# Patient Record
Sex: MALE | Race: WHITE | NOT HISPANIC OR LATINO | Employment: OTHER | URBAN - METROPOLITAN AREA
[De-identification: names, ages, dates, MRNs, and addresses within clinical notes are randomized per-mention and may not be internally consistent; named-entity substitution may affect disease eponyms.]

---

## 2018-05-29 ENCOUNTER — CONVERSION ENCOUNTER (OUTPATIENT)
Dept: CARDIOLOGY | Facility: CLINIC | Age: 63
End: 2018-05-29

## 2018-05-29 ENCOUNTER — OFFICE VISIT CONVERTED (OUTPATIENT)
Dept: CARDIOLOGY | Facility: CLINIC | Age: 63
End: 2018-05-29
Attending: SPECIALIST

## 2018-06-20 ENCOUNTER — OFFICE VISIT CONVERTED (OUTPATIENT)
Dept: UROLOGY | Facility: CLINIC | Age: 63
End: 2018-06-20
Attending: UROLOGY

## 2018-07-09 ENCOUNTER — OFFICE VISIT CONVERTED (OUTPATIENT)
Dept: OTHER | Facility: HOSPITAL | Age: 63
End: 2018-07-09
Attending: INTERNAL MEDICINE

## 2018-12-04 ENCOUNTER — OFFICE VISIT CONVERTED (OUTPATIENT)
Dept: CARDIOLOGY | Facility: CLINIC | Age: 63
End: 2018-12-04
Attending: SPECIALIST

## 2018-12-17 ENCOUNTER — OFFICE VISIT CONVERTED (OUTPATIENT)
Dept: NEUROSURGERY | Facility: CLINIC | Age: 63
End: 2018-12-17
Attending: NEUROLOGICAL SURGERY

## 2019-01-04 ENCOUNTER — HOSPITAL ENCOUNTER (OUTPATIENT)
Dept: OTHER | Facility: HOSPITAL | Age: 64
Discharge: HOME | End: 2019-01-04
Attending: INTERNAL MEDICINE

## 2019-01-04 LAB
BASOPHILS # BLD AUTO: 0.01 10*3/UL (ref 0–0.2)
BASOPHILS NFR BLD AUTO: 0.09 % (ref 0–3)
EOSINOPHIL # BLD AUTO: 0.13 10*3/UL (ref 0–0.7)
EOSINOPHIL # BLD AUTO: 1.8 % (ref 0–7)
ERYTHROCYTE [DISTWIDTH] IN BLOOD BY AUTOMATED COUNT: 11.8 % (ref 11.5–14.5)
HBA1C MFR BLD: 16.5 G/DL (ref 14–18)
HCT VFR BLD AUTO: 47.3 % (ref 42–52)
LYMPHOCYTES # BLD AUTO: 1.61 10*3/UL (ref 1–5)
MCH RBC QN AUTO: 29.6 PG (ref 27–31)
MCHC RBC AUTO-ENTMCNC: 34.9 G/DL (ref 33–37)
MCV RBC AUTO: 84.8 FL (ref 80–96)
MONOCYTES # BLD AUTO: 0.66 10*3/UL (ref 0.2–1.2)
MONOCYTES NFR BLD AUTO: 8.83 % (ref 3–10)
NEUTROPHILS # BLD AUTO: 5.01 10*3/UL (ref 2–8)
NEUTROPHILS NFR BLD AUTO: 67.5 % (ref 30–85)
NRBC BLD AUTO-RTO: 0 % (ref 0–0.01)
PLATELET # BLD AUTO: 226 10*3/UL (ref 130–400)
PMV BLD AUTO: 8.6 FL (ref 7.4–10.4)
RBC # BLD AUTO: 5.58 10*6/UL (ref 4.7–6.1)
VARIANT LYMPHS NFR BLD MANUAL: 21.7 % (ref 20–45)
WBC # BLD AUTO: 7.41 10*3/UL (ref 4.8–10.8)

## 2019-01-07 ENCOUNTER — OFFICE VISIT CONVERTED (OUTPATIENT)
Dept: OTHER | Facility: HOSPITAL | Age: 64
End: 2019-01-07
Attending: INTERNAL MEDICINE

## 2019-01-11 ENCOUNTER — HOSPITAL ENCOUNTER (OUTPATIENT)
Dept: PREADMISSION TESTING | Facility: HOSPITAL | Age: 64
Discharge: HOME | End: 2019-01-11
Attending: NEUROLOGICAL SURGERY

## 2019-01-18 ENCOUNTER — HOSPITAL ENCOUNTER (OUTPATIENT)
Dept: PERIOP | Facility: HOSPITAL | Age: 64
Setting detail: HOSPITAL OUTPATIENT SURGERY
Discharge: HOME | End: 2019-01-18
Attending: NEUROLOGICAL SURGERY

## 2019-01-18 LAB
ANION GAP SERPL CALC-SCNC: 15 MMOL/L (ref 8–19)
BASOPHILS # BLD AUTO: 0.02 10*3/UL (ref 0–0.2)
BASOPHILS NFR BLD AUTO: 0.22 % (ref 0–3)
BUN SERPL-MCNC: 23 MG/DL (ref 5–25)
BUN/CREAT SERPL: 17 {RATIO} (ref 6–20)
CALCIUM SERPL-MCNC: 9.3 MG/DL (ref 8.7–10.4)
CHLORIDE SERPL-SCNC: 108 MMOL/L (ref 99–111)
CONV CO2: 25 MMOL/L (ref 22–32)
CREAT UR-MCNC: 1.33 MG/DL (ref 0.7–1.2)
EOSINOPHIL # BLD AUTO: 0.13 10*3/UL (ref 0–0.7)
EOSINOPHIL # BLD AUTO: 1.46 % (ref 0–7)
ERYTHROCYTE [DISTWIDTH] IN BLOOD BY AUTOMATED COUNT: 12 % (ref 11.5–14.5)
GFR SERPLBLD BASED ON 1.73 SQ M-ARVRAT: 56 ML/MIN/{1.73_M2}
GLUCOSE BLD-MCNC: 118 MG/DL (ref 70–99)
GLUCOSE SERPL-MCNC: 108 MG/DL (ref 70–99)
HBA1C MFR BLD: 15.8 G/DL (ref 14–18)
HCT VFR BLD AUTO: 44.3 % (ref 42–52)
LYMPHOCYTES # BLD AUTO: 1.31 10*3/UL (ref 1–5)
MCH RBC QN AUTO: 30.2 PG (ref 27–31)
MCHC RBC AUTO-ENTMCNC: 35.8 G/DL (ref 33–37)
MCV RBC AUTO: 84.3 FL (ref 80–96)
MONOCYTES # BLD AUTO: 0.74 10*3/UL (ref 0.2–1.2)
MONOCYTES NFR BLD AUTO: 8.6 % (ref 3–10)
NEUTROPHILS # BLD AUTO: 6.43 10*3/UL (ref 2–8)
NEUTROPHILS NFR BLD AUTO: 74.5 % (ref 30–85)
NRBC BLD AUTO-RTO: 0 % (ref 0–0.01)
OSMOLALITY SERPL CALC.SUM OF ELEC: 300 MOSM/KG (ref 273–304)
PLATELET # BLD AUTO: 212 10*3/UL (ref 130–400)
PMV BLD AUTO: 8.6 FL (ref 7.4–10.4)
POTASSIUM SERPL-SCNC: 4.5 MMOL/L (ref 3.5–5.3)
RBC # BLD AUTO: 5.25 10*6/UL (ref 4.7–6.1)
SODIUM SERPL-SCNC: 143 MMOL/L (ref 135–147)
VARIANT LYMPHS NFR BLD MANUAL: 15.2 % (ref 20–45)
WBC # BLD AUTO: 8.63 10*3/UL (ref 4.8–10.8)

## 2019-01-29 ENCOUNTER — OFFICE VISIT CONVERTED (OUTPATIENT)
Dept: NEUROSURGERY | Facility: CLINIC | Age: 64
End: 2019-01-29
Attending: PHYSICIAN ASSISTANT

## 2019-07-09 ENCOUNTER — HOSPITAL ENCOUNTER (OUTPATIENT)
Dept: OTHER | Facility: HOSPITAL | Age: 64
Discharge: HOME | End: 2019-07-09
Attending: INTERNAL MEDICINE

## 2019-07-09 LAB
BASOPHILS # BLD AUTO: 0.04 10*3/UL (ref 0–0.2)
BASOPHILS NFR BLD AUTO: 0.5 % (ref 0–3)
CONV ABS IMM GRAN: 0.08 10*3/UL (ref 0–0.2)
CONV IMMATURE GRAN: 1 % (ref 0–1.8)
DEPRECATED RDW RBC AUTO: 38.6 FL (ref 35.1–43.9)
EOSINOPHIL # BLD AUTO: 0.15 10*3/UL (ref 0–0.7)
EOSINOPHIL # BLD AUTO: 1.9 % (ref 0–7)
ERYTHROCYTE [DISTWIDTH] IN BLOOD BY AUTOMATED COUNT: 12.1 % (ref 11.6–14.4)
HBA1C MFR BLD: 16.1 G/DL (ref 14–18)
HCT VFR BLD AUTO: 48.6 % (ref 42–52)
LYMPHOCYTES # BLD AUTO: 1.77 10*3/UL (ref 1–5)
MCH RBC QN AUTO: 28.8 PG (ref 27–31)
MCHC RBC AUTO-ENTMCNC: 33.1 G/DL (ref 33–37)
MCV RBC AUTO: 86.9 FL (ref 80–96)
MONOCYTES # BLD AUTO: 0.64 10*3/UL (ref 0.2–1.2)
MONOCYTES NFR BLD AUTO: 8.1 % (ref 3–10)
NEUTROPHILS # BLD AUTO: 5.23 10*3/UL (ref 2–8)
NEUTROPHILS NFR BLD AUTO: 66.1 % (ref 30–85)
NRBC CBCN: 0 % (ref 0–0.7)
PLATELET # BLD AUTO: 221 10*3/UL (ref 130–400)
PMV BLD AUTO: 11.9 FL (ref 9.4–12.4)
RBC # BLD AUTO: 5.59 10*6/UL (ref 4.7–6.1)
VARIANT LYMPHS NFR BLD MANUAL: 22.4 % (ref 20–45)
WBC # BLD AUTO: 7.91 10*3/UL (ref 4.8–10.8)

## 2019-07-17 ENCOUNTER — OFFICE VISIT CONVERTED (OUTPATIENT)
Dept: OTHER | Facility: HOSPITAL | Age: 64
End: 2019-07-17
Attending: INTERNAL MEDICINE

## 2019-11-08 ENCOUNTER — OFFICE VISIT CONVERTED (OUTPATIENT)
Dept: ORTHOPEDIC SURGERY | Facility: CLINIC | Age: 64
End: 2019-11-08
Attending: ORTHOPAEDIC SURGERY

## 2019-11-12 ENCOUNTER — OFFICE VISIT CONVERTED (OUTPATIENT)
Dept: CARDIOLOGY | Facility: CLINIC | Age: 64
End: 2019-11-12
Attending: SPECIALIST

## 2019-11-26 ENCOUNTER — LAB CONVERTED (OUTPATIENT)
Dept: UROLOGY | Facility: CLINIC | Age: 64
End: 2019-11-26
Attending: UROLOGY

## 2019-11-26 ENCOUNTER — HOSPITAL ENCOUNTER (OUTPATIENT)
Dept: UROLOGY | Facility: CLINIC | Age: 64
Discharge: HOME | End: 2019-11-26
Attending: UROLOGY

## 2019-11-26 LAB — PSA SERPL-MCNC: 0.74 NG/ML (ref 0–4)

## 2019-12-03 ENCOUNTER — OFFICE VISIT CONVERTED (OUTPATIENT)
Dept: ORTHOPEDIC SURGERY | Facility: CLINIC | Age: 64
End: 2019-12-03
Attending: ORTHOPAEDIC SURGERY

## 2019-12-10 ENCOUNTER — OFFICE VISIT CONVERTED (OUTPATIENT)
Dept: UROLOGY | Facility: CLINIC | Age: 64
End: 2019-12-10
Attending: UROLOGY

## 2019-12-30 ENCOUNTER — HOSPITAL ENCOUNTER (OUTPATIENT)
Dept: OTHER | Facility: HOSPITAL | Age: 64
Discharge: HOME | End: 2019-12-30
Attending: UROLOGY

## 2019-12-30 LAB
ALBUMIN SERPL-MCNC: 4.1 G/DL (ref 3.5–5)
ALBUMIN/GLOB SERPL: 1.6 {RATIO} (ref 1.4–2.6)
ALP SERPL-CCNC: 74 U/L (ref 56–155)
ALT SERPL-CCNC: 27 U/L (ref 10–40)
ANION GAP SERPL CALC-SCNC: 16 MMOL/L (ref 8–19)
AST SERPL-CCNC: 17 U/L (ref 15–50)
BASOPHILS # BLD AUTO: 0.05 10*3/UL (ref 0–0.2)
BASOPHILS NFR BLD AUTO: 0.6 % (ref 0–3)
BILIRUB SERPL-MCNC: 0.35 MG/DL (ref 0.2–1.3)
BUN SERPL-MCNC: 25 MG/DL (ref 5–25)
BUN/CREAT SERPL: 18 {RATIO} (ref 6–20)
CALCIUM SERPL-MCNC: 9.2 MG/DL (ref 8.7–10.4)
CHLORIDE SERPL-SCNC: 105 MMOL/L (ref 99–111)
CONV ABS IMM GRAN: 0.09 10*3/UL (ref 0–0.2)
CONV CO2: 24 MMOL/L (ref 22–32)
CONV IMMATURE GRAN: 1.1 % (ref 0–1.8)
CONV TOTAL PROTEIN: 6.6 G/DL (ref 6.3–8.2)
CREAT UR-MCNC: 1.41 MG/DL (ref 0.7–1.2)
DEPRECATED RDW RBC AUTO: 39.6 FL (ref 35.1–43.9)
EOSINOPHIL # BLD AUTO: 0.16 10*3/UL (ref 0–0.7)
EOSINOPHIL # BLD AUTO: 2 % (ref 0–7)
ERYTHROCYTE [DISTWIDTH] IN BLOOD BY AUTOMATED COUNT: 12.1 % (ref 11.6–14.4)
GFR SERPLBLD BASED ON 1.73 SQ M-ARVRAT: 51 ML/MIN/{1.73_M2}
GLOBULIN UR ELPH-MCNC: 2.5 G/DL (ref 2–3.5)
GLUCOSE SERPL-MCNC: 160 MG/DL (ref 70–99)
HCT VFR BLD AUTO: 46.4 % (ref 42–52)
HGB BLD-MCNC: 15.2 G/DL (ref 14–18)
LYMPHOCYTES # BLD AUTO: 1.91 10*3/UL (ref 1–5)
LYMPHOCYTES NFR BLD AUTO: 23.8 % (ref 20–45)
MCH RBC QN AUTO: 29.2 PG (ref 27–31)
MCHC RBC AUTO-ENTMCNC: 32.8 G/DL (ref 33–37)
MCV RBC AUTO: 89.2 FL (ref 80–96)
MONOCYTES # BLD AUTO: 0.67 10*3/UL (ref 0.2–1.2)
MONOCYTES NFR BLD AUTO: 8.3 % (ref 3–10)
NEUTROPHILS # BLD AUTO: 5.16 10*3/UL (ref 2–8)
NEUTROPHILS NFR BLD AUTO: 64.2 % (ref 30–85)
NRBC CBCN: 0 % (ref 0–0.7)
OSMOLALITY SERPL CALC.SUM OF ELEC: 300 MOSM/KG (ref 273–304)
PLATELET # BLD AUTO: 224 10*3/UL (ref 130–400)
PMV BLD AUTO: 11.7 FL (ref 9.4–12.4)
POTASSIUM SERPL-SCNC: 4.3 MMOL/L (ref 3.5–5.3)
RBC # BLD AUTO: 5.2 10*6/UL (ref 4.7–6.1)
SODIUM SERPL-SCNC: 141 MMOL/L (ref 135–147)
WBC # BLD AUTO: 8.04 10*3/UL (ref 4.8–10.8)

## 2020-01-06 LAB
BIOAVAILABLE TESTOSTERONE, %: 34.2 %
CONV TESTOSTERONE, FREE: 54 PG/ML
SHBG SERPL-SCNC: 42.4 NMOL/L
TESTOST FREE MFR SERPL: 1.7 %
TESTOST SERPL-MCNC: 320 NG/DL
TESTOSTERONE.FREE+WB SERPL-MCNC: 109 NG/DL

## 2020-04-14 ENCOUNTER — OFFICE VISIT CONVERTED (OUTPATIENT)
Dept: ORTHOPEDIC SURGERY | Facility: CLINIC | Age: 65
End: 2020-04-14
Attending: ORTHOPAEDIC SURGERY

## 2020-05-26 ENCOUNTER — OFFICE VISIT CONVERTED (OUTPATIENT)
Dept: ORTHOPEDIC SURGERY | Facility: CLINIC | Age: 65
End: 2020-05-26
Attending: PHYSICIAN ASSISTANT

## 2020-06-23 ENCOUNTER — OFFICE VISIT CONVERTED (OUTPATIENT)
Dept: ORTHOPEDIC SURGERY | Facility: CLINIC | Age: 65
End: 2020-06-23
Attending: PHYSICIAN ASSISTANT

## 2020-06-30 ENCOUNTER — OFFICE VISIT CONVERTED (OUTPATIENT)
Dept: CARDIOLOGY | Facility: CLINIC | Age: 65
End: 2020-06-30
Attending: SPECIALIST

## 2020-07-06 ENCOUNTER — HOSPITAL ENCOUNTER (OUTPATIENT)
Dept: PREADMISSION TESTING | Facility: HOSPITAL | Age: 65
Discharge: HOME | End: 2020-07-06
Attending: SPECIALIST

## 2020-07-07 LAB — SARS-COV-2 RNA SPEC QL NAA+PROBE: NOT DETECTED

## 2020-07-08 ENCOUNTER — HOSPITAL ENCOUNTER (OUTPATIENT)
Dept: INFUSION THERAPY | Facility: HOSPITAL | Age: 65
Setting detail: HOSPITAL OUTPATIENT SURGERY
Discharge: HOME | End: 2020-07-08
Attending: SPECIALIST

## 2020-07-08 LAB
ANION GAP SERPL CALC-SCNC: 12 MMOL/L (ref 8–19)
BASOPHILS # BLD AUTO: 0.04 10*3/UL (ref 0–0.2)
BASOPHILS NFR BLD AUTO: 0.5 % (ref 0–3)
BUN SERPL-MCNC: 28 MG/DL (ref 5–25)
BUN/CREAT SERPL: 19 {RATIO} (ref 6–20)
CALCIUM SERPL-MCNC: 9.1 MG/DL (ref 8.7–10.4)
CHLORIDE SERPL-SCNC: 105 MMOL/L (ref 99–111)
CONV ABS IMM GRAN: 0.13 10*3/UL (ref 0–0.2)
CONV CO2: 25 MMOL/L (ref 22–32)
CONV IMMATURE GRAN: 1.5 % (ref 0–1.8)
CREAT UR-MCNC: 1.49 MG/DL (ref 0.7–1.2)
DEPRECATED RDW RBC AUTO: 40.9 FL (ref 35.1–43.9)
EOSINOPHIL # BLD AUTO: 0.16 10*3/UL (ref 0–0.7)
EOSINOPHIL # BLD AUTO: 1.9 % (ref 0–7)
ERYTHROCYTE [DISTWIDTH] IN BLOOD BY AUTOMATED COUNT: 12.6 % (ref 11.6–14.4)
GFR SERPLBLD BASED ON 1.73 SQ M-ARVRAT: 48 ML/MIN/{1.73_M2}
GLUCOSE SERPL-MCNC: 194 MG/DL (ref 70–99)
HCT VFR BLD AUTO: 45.5 % (ref 42–52)
HGB BLD-MCNC: 15.3 G/DL (ref 14–18)
INR PPP: 1.07 (ref 2–3)
LYMPHOCYTES # BLD AUTO: 1.74 10*3/UL (ref 1–5)
LYMPHOCYTES NFR BLD AUTO: 20.7 % (ref 20–45)
MCH RBC QN AUTO: 29.8 PG (ref 27–31)
MCHC RBC AUTO-ENTMCNC: 33.6 G/DL (ref 33–37)
MCV RBC AUTO: 88.5 FL (ref 80–96)
MONOCYTES # BLD AUTO: 0.68 10*3/UL (ref 0.2–1.2)
MONOCYTES NFR BLD AUTO: 8.1 % (ref 3–10)
NEUTROPHILS # BLD AUTO: 5.67 10*3/UL (ref 2–8)
NEUTROPHILS NFR BLD AUTO: 67.3 % (ref 30–85)
NRBC CBCN: 0 % (ref 0–0.7)
OSMOLALITY SERPL CALC.SUM OF ELEC: 297 MOSM/KG (ref 273–304)
PLATELET # BLD AUTO: 201 10*3/UL (ref 130–400)
PMV BLD AUTO: 12.3 FL (ref 9.4–12.4)
POTASSIUM SERPL-SCNC: 4.4 MMOL/L (ref 3.5–5.3)
PROTHROMBIN TIME: 11.4 S (ref 9.4–12)
RBC # BLD AUTO: 5.14 10*6/UL (ref 4.7–6.1)
SODIUM SERPL-SCNC: 138 MMOL/L (ref 135–147)
WBC # BLD AUTO: 8.42 10*3/UL (ref 4.8–10.8)

## 2020-07-21 ENCOUNTER — OFFICE VISIT CONVERTED (OUTPATIENT)
Dept: CARDIOLOGY | Facility: CLINIC | Age: 65
End: 2020-07-21
Attending: SPECIALIST

## 2020-07-30 ENCOUNTER — OFFICE VISIT CONVERTED (OUTPATIENT)
Dept: UROLOGY | Facility: CLINIC | Age: 65
End: 2020-07-30
Attending: UROLOGY

## 2020-07-30 ENCOUNTER — HOSPITAL ENCOUNTER (OUTPATIENT)
Dept: UROLOGY | Facility: CLINIC | Age: 65
Discharge: HOME | End: 2020-07-30
Attending: UROLOGY

## 2020-07-31 LAB — PSA SERPL-MCNC: 0.68 NG/ML (ref 0–4)

## 2021-02-02 ENCOUNTER — HOSPITAL ENCOUNTER (OUTPATIENT)
Dept: PERIOP | Facility: HOSPITAL | Age: 66
Setting detail: HOSPITAL OUTPATIENT SURGERY
Discharge: HOME | End: 2021-02-02
Attending: OPHTHALMOLOGY

## 2021-02-02 LAB — GLUCOSE BLD-MCNC: 84 MG/DL (ref 70–99)

## 2021-02-09 ENCOUNTER — OFFICE VISIT CONVERTED (OUTPATIENT)
Dept: CARDIOLOGY | Facility: CLINIC | Age: 66
End: 2021-02-09
Attending: SPECIALIST

## 2021-02-17 ENCOUNTER — OFFICE VISIT CONVERTED (OUTPATIENT)
Dept: UROLOGY | Facility: CLINIC | Age: 66
End: 2021-02-17
Attending: UROLOGY

## 2021-02-17 ENCOUNTER — CONVERSION ENCOUNTER (OUTPATIENT)
Dept: UROLOGY | Facility: CLINIC | Age: 66
End: 2021-02-17

## 2021-02-17 ENCOUNTER — HOSPITAL ENCOUNTER (OUTPATIENT)
Dept: UROLOGY | Facility: CLINIC | Age: 66
Discharge: HOME | End: 2021-02-17
Attending: UROLOGY

## 2021-02-17 LAB
BILIRUB UR QL STRIP: NEGATIVE
COLOR UR: YELLOW
CONV BACTERIA IN URINE MICRO: NORMAL
CONV CALCIUM OXALATE CRYSTALS /HPF IN URINE SEDIMENT BY MICROSCOPY: 0
CONV CLARITY OF URINE: CLEAR
CONV PROTEIN IN URINE BY AUTOMATED TEST STRIP: NEGATIVE
CONV UROBILINOGEN IN URINE BY AUTOMATED TEST STRIP: NORMAL
GLUCOSE UR QL: NEGATIVE
HGB UR QL STRIP: NEGATIVE
KETONES UR QL STRIP: NEGATIVE
LEUKOCYTE ESTERASE UR QL STRIP: NEGATIVE
NITRITE UR QL STRIP: NEGATIVE
PH UR STRIP.AUTO: 5.5 [PH]
RBC #/AREA URNS HPF: NORMAL /[HPF]
RENAL EPI CELLS #/AREA URNS HPF: 0 /[HPF]
SP GR UR: 1.02
SQUAMOUS SPT QL MICRO: 0
WBC #/AREA URNS HPF: NORMAL /[HPF]

## 2021-02-19 LAB — BACTERIA UR CULT: NORMAL

## 2021-03-15 ENCOUNTER — HOSPITAL ENCOUNTER (OUTPATIENT)
Dept: VACCINE CLINIC | Facility: HOSPITAL | Age: 66
Discharge: HOME | End: 2021-03-15
Attending: INTERNAL MEDICINE

## 2021-04-05 ENCOUNTER — HOSPITAL ENCOUNTER (OUTPATIENT)
Dept: VACCINE CLINIC | Facility: HOSPITAL | Age: 66
Discharge: HOME | End: 2021-04-05
Attending: INTERNAL MEDICINE

## 2021-05-10 NOTE — H&P
History and Physical      Patient Name: Jean-Pierre Anaya   Patient ID: [id number]   Sex: Male   YOB: 1955    Primary Care Provider: Ian Wilson MD   Referring Provider: Ian Wilson MD    Visit Date: April 14, 2020    Provider: Ophelia Shepard MD   Location: [facility]   Location Address: [address] [place] [address]  [id number]   Location Phone: [phone]          Chief Complaint  · Left Humerus Pain      History Of Present Illness  Jean-Pierre Anaya is a 64 year old /White male who presents today to [facility]. He is here for evaluation of his left arm. He said he had a flu shot about a year or so ago. He has a mass that is in the area of his mid arm that is a little bit bigger than a grape, smaller than a golf ball. It is nontender. His main complaint is with certain movements, like abduction and going behind his back he is having pain in this area, but not over the mass. I think it is coming more from the shoulder.       Past Medical History  Arthritis; Asthma; Bilateral carpal tunnel syndrome; BPH (benign prostatic hyperplasia); Diabetes; Diabetes mellitus type 2, noninsulin dependent; DM (diabetes mellitus); ED (erectile dysfunction) of organic origin; Hyperlipemia; Hyperlipidemia; Hypertension; Hypertension, Benign Essential; Hypogonadism male; Leg swelling; Neuropathy; Pre-op exam; Prostate Disorder; Renal calculus or stone; Seasonal allergies         Past Surgical History  Carpal Tunnel Release; Colonoscopy; Cystoscopy; Eye Implant; Holmium Lasertripsy; Kidney Stone Surgery, Unspecified; TURP         Medication List  allopurinol 300 mg Oral tablet; aspirin 81 mg Oral tablet,delayed release (DR/EC); carvedilol 3.125 mg oral tablet; fenofibrate 160 mg oral tablet; fexofenadine 180 mg Oral tablet; fluticasone 50 mcg/actuation nasal spray,suspension; gabapentin 300 mg oral capsule; Humalog 100 unit/mL subcutaneous solution; Lipitor 20 mg oral tablet; meloxicam oral;  "montelukast 10 mg oral tablet; Omega-3 350 mg-235 mg- 90 mg-597 mg oral capsule,delayed release(DR/EC); potassium citrate 10 mEq (1,080 mg) oral tablet extended release; ProAir HFA 90 mcg/actuation inhalation HFA aerosol inhaler; Toujeo SoloStar 300 unit/mL (1.5 mL) subcutaneous insulin pen; Vitamin D3 2,000 unit Oral tablet         Allergy List  NO KNOWN DRUG ALLERGIES         Family Medical History  Cancer, Unspecified; Breast Neoplasm; Lung cancer; Renal cancer; Osteoporosis; Family history of Arthritis; Family history of cancer; Family history of osteoporosis         Social History  Alcohol (Never); Alcohol Use (Never); lives with spouse; ; .; Recreational Drug Use (Never); Retired; Retired.; Tobacco (Never)         Review of Systems  · Constitutional  o Denies  o : fever, chills, weight loss  · Cardiovascular  o Denies  o : chest pain, shortness of breath  · Gastrointestinal  o Denies  o : liver disease, heartburn, nausea, blood in stools  · Genitourinary  o Denies  o : painful urination, blood in urine  · Integument  o Denies  o : rash, itching  · Neurologic  o Denies  o : headache, weakness, loss of consciousness  · Musculoskeletal  o Denies  o : painful, swollen joints  · Psychiatric  o Denies  o : drug/alcohol addiction, anxiety, depression      Vitals  Date Time BP Position Site L\R Cuff Size HR RR TEMP (F) WT  HT  BMI kg/m2 BSA m2 O2 Sat        04/14/2020 10:39 AM      69 - R   267lbs 0oz 5'  9.5\" 38.86 2.44 96 %          Physical Examination  · Constitutional  o Appearance  o : well developed, well-nourished, no obvious deformities present  · Head and Face  o Head  o :   § Inspection  § : normocephalic  o Face  o :   § Inspection  § : no facial lesions  · Eyes  o Conjunctivae  o : conjunctivae normal  o Sclerae  o : sclerae white  · Ears, Nose, Mouth and Throat  o Ears  o :   § External Ears  § : appearance within normal limits  § Hearing  § : intact  o Nose  o :   § External Nose  § : " appearance normal  · Neck  o Inspection/Palpation  o : normal appearance  o Range of Motion  o : full range of motion  · Respiratory  o Respiratory Effort  o : breathing unlabored  o Inspection of Chest  o : normal appearance  o Auscultation of Lungs  o : no audible wheezing or rales  · Cardiovascular  o Heart  o : regular rate  · Gastrointestinal  o Abdominal Examination  o : soft and non-tender  · Skin and Subcutaneous Tissue  o General Inspection  o : intact, no rashes  · Psychiatric  o General  o : Alert and oriented x3  o Judgement and Insight  o : judgment and insight intact  o Mood and Affect  o : mood normal, affect appropriate  · Left Arm  o Inspection  o : Positive impingement test. Sensation intact, pulse is normal. Affect is pleasant. He is good for elevation and abduction to about 40 degrees then starts experiencing pain. External rotation to about 45 degrees. Internal rotation to L5.   · In Office Procedures  o View  o : AP/LATERAL  o Site  o : left, shoulder   o Indication  o : Left shoulder pain   o Study  o : X-rays ordered, taken in the office, and reviewed today.  o Xray  o : X-rays show no fracture.  o Comparative Data  o : No comparative data found          Assessment  · Left shoulder pain, unspecified chronicity     719.41/M25.512  · Shoulder impingement syndrome, left     726.2/M75.42  · Arm mass, left     782.2/R22.32      Plan  · Orders  o Humerus (Left) 2 or more views X-Ray [facility] Preferred View ([id number]) - 719.41/M25.512 - 04/14/2020  · Medications  o Medications have been Reconciled  o Transition of Care or Provider Policy  · Instructions  o Reviewed the patient's Past Medical, Social, and Family history as well as the ROS at today's visit, no changes.  o Call or return if worsening symptoms.  o This note is transcribed by Maryann Snowden mc  o Going to try some shoulder exercises. He takes Meloxicam for his arthritis. Keep an eye on this mass and if it gets bigger, we will consider excising  it, but I believe it is just a lipoma.             Electronically Signed by: Maryann Snowden, -Author on April 16, 2020 09:06:00 AM  Electronically Co-signed by: Ophelia Shepard MD -Reviewer on April 17, 2020 09:01:31 AM

## 2021-05-13 NOTE — PROGRESS NOTES
Progress Note      Patient Name: Jean-Pierre Anaya   Patient ID: [id number]   Sex: Male   YOB: 1955    Primary Care Provider: Ian Wilson MD   Referring Provider: Ian Wilson MD    Visit Date: June 23, 2020    Provider: Colleen Diamond PA-C   Location: [facility]   Location Address: [address] [place] [address]  [id number]   Location Phone: [phone]          Chief Complaint  · Follow up left shoulder pain      History Of Present Illness  Jean-Pierre Anaya is a 64 year old /White male who presents today to [facility].      He is here for follow up for left shoulder pain. He had injection last visit. He states pain is improved about 50%, but has some weakness.       Past Medical History  Arthritis; Asthma; Bilateral Carpal Tunnel Syndrome; BPH (benign prostatic hyperplasia); Diabetes; Diabetes mellitus type 2, noninsulin dependent; DM (diabetes mellitus); ED (erectile dysfunction) of organic origin; Hyperlipemia; Hyperlipidemia; Hypertension; Hypertension, Benign Essential; Hypogonadism male; Leg swelling; Neuropathy; Pre-op exam; Prostate Disorder; Renal calculus or stone; Seasonal allergies         Past Surgical History  Carpal Tunnel Release; Colonoscopy; Cystoscopy; Eye Implant; Holmium Lasertripsy; Kidney Stone Surgery, Unspecified; TURP         Medication List  allopurinol 300 mg Oral tablet; aspirin 81 mg Oral tablet,delayed release (DR/EC); carvedilol 3.125 mg oral tablet; fenofibrate 160 mg oral tablet; fexofenadine 180 mg Oral tablet; fluticasone 50 mcg/actuation nasal spray,suspension; gabapentin 300 mg oral capsule; Humalog 100 unit/mL subcutaneous solution; Lipitor 20 mg oral tablet; meloxicam oral; montelukast 10 mg oral tablet; Omega-3 350 mg-235 mg- 90 mg-597 mg oral capsule,delayed release(DR/EC); potassium citrate 10 mEq (1,080 mg) oral tablet extended release; ProAir HFA 90 mcg/actuation inhalation HFA aerosol inhaler; Toujeo SoloStar 300 unit/mL (1.5 mL)  "subcutaneous insulin pen; Vitamin D3 2,000 unit Oral tablet         Allergy List  NO KNOWN DRUG ALLERGIES       Allergies Reconciled  Family Medical History  Cancer, Unspecified; Breast Neoplasm; Lung cancer; Renal Cancer; Osteoporosis; Family history of Arthritis; Family history of cancer; Family history of osteoporosis         Social History  Alcohol (Never); Alcohol Use (Never); lives with spouse; ; .; Recreational Drug Use (Never); Retired; Retired.; Tobacco (Never)         Review of Systems  · Constitutional  o Denies  o : fever, chills, weight loss  · Cardiovascular  o Denies  o : chest pain, shortness of breath  · Gastrointestinal  o Denies  o : liver disease, heartburn, nausea, blood in stools  · Genitourinary  o Denies  o : painful urination, blood in urine  · Integument  o Denies  o : rash, itching  · Neurologic  o Denies  o : headache, weakness, loss of consciousness  · Musculoskeletal  o Admits  o : painful, swollen joints  · Psychiatric  o Denies  o : drug/alcohol addiction, anxiety, depression      Vitals  Date Time BP Position Site L\R Cuff Size HR RR TEMP (F) WT  HT  BMI kg/m2 BSA m2 O2 Sat        06/23/2020 10:34 AM      69 - R   255lbs 0oz 5'  9.5\" 37.12 2.38 97 %          Physical Examination  · Constitutional  o Appearance  o : well developed, well-nourished, no obvious deformities present  · Head and Face  o Head  o :   § Inspection  § : normocephalic  o Face  o :   § Inspection  § : no facial lesions  · Eyes  o Conjunctivae  o : conjunctivae normal  o Sclerae  o : sclerae white  · Ears, Nose, Mouth and Throat  o Ears  o :   § External Ears  § : appearance within normal limits  § Hearing  § : intact  o Nose  o :   § External Nose  § : appearance normal  · Neck  o Inspection/Palpation  o : normal appearance  o Range of Motion  o : full range of motion  · Respiratory  o Respiratory Effort  o : breathing unlabored  o Inspection of Chest  o : normal appearance  o Auscultation of " Lungs  o : no audible wheezing or rales  · Cardiovascular  o Heart  o : regular rate  · Gastrointestinal  o Abdominal Examination  o : soft and non-tender  · Skin and Subcutaneous Tissue  o General Inspection  o : intact, no rashes  · Psychiatric  o General  o : Alert and oriented x3  o Judgement and Insight  o : judgment and insight intact  o Mood and Affect  o : mood normal, affect appropriate  · Left Shoulder  o Inspection  o : No atrophy or discoloration. Grape sized mobile mass over anterior deltoid. Full PROM. Pain and weakness with empty can test. Neurovascularly intact.           Assessment  · Pain: Shoulder     719.41/M25.519      Plan  · Instructions  o Reviewed the patient's Past Medical, Social, and Family history as well as the ROS at today's visit, no changes.  o Call or return if worsening symptoms.  o He will let it ride with this shot. He may call for repeat injection vs. CT arthrogram prior to next follow up. He has left cochlear implant that is a contraindication for MRI. Follow Up PRN.            Electronically Signed by: KEITH Nesbitt-CUBA -Author on June 23, 2020 10:55:14 AM  Electronically Co-signed by: Ophelia Shepard MD -Reviewer on June 25, 2020 12:16:39 PM

## 2021-05-13 NOTE — PROGRESS NOTES
Progress Note      Patient Name: Jean-Pierre Anaya   Patient ID: [id number]   Sex: Male   YOB: 1955    Primary Care Provider: Ian Wilson MD   Referring Provider: Ian Wilson MD    Visit Date: May 26, 2020    Provider: Colleen Diamond PA-C   Location: [facility]   Location Address: [address] [place] [address]  [id number]   Location Phone: [phone]          Chief Complaint  · Follow up left shoulder pain      History Of Present Illness  Jean-Pierre Anaya is a 64 year old /White male who presents today to [facility].      He is following up for left shoulder pain. He states pain is anterior and lateral and runs down his arm. He has been doing some home exercises, but pain persists. He states weakness of left arm.       Past Medical History  Arthritis; Asthma; Bilateral carpal tunnel syndrome; BPH (benign prostatic hyperplasia); Diabetes; Diabetes mellitus type 2, noninsulin dependent; DM (diabetes mellitus); ED (erectile dysfunction) of organic origin; Hyperlipemia; Hyperlipidemia; Hypertension; Hypertension, Benign Essential; Hypogonadism male; Leg swelling; Neuropathy; Pre-op exam; Prostate Disorder; Renal calculus or stone; Seasonal allergies         Past Surgical History  Carpal Tunnel Release; Colonoscopy; Cystoscopy; Eye Implant; Holmium Lasertripsy; Kidney Stone Surgery, Unspecified; TURP         Medication List  allopurinol 300 mg Oral tablet; aspirin 81 mg Oral tablet,delayed release (DR/EC); carvedilol 3.125 mg oral tablet; fenofibrate 160 mg oral tablet; fexofenadine 180 mg Oral tablet; fluticasone 50 mcg/actuation nasal spray,suspension; gabapentin 300 mg oral capsule; Humalog 100 unit/mL subcutaneous solution; Lipitor 20 mg oral tablet; meloxicam oral; montelukast 10 mg oral tablet; Omega-3 350 mg-235 mg- 90 mg-597 mg oral capsule,delayed release(DR/EC); potassium citrate 10 mEq (1,080 mg) oral tablet extended release; ProAir HFA 90 mcg/actuation inhalation HFA  "aerosol inhaler; Toujeo SoloStar 300 unit/mL (1.5 mL) subcutaneous insulin pen; Vitamin D3 2,000 unit Oral tablet         Allergy List  NO KNOWN DRUG ALLERGIES       Allergies Reconciled  Family Medical History  Cancer, Unspecified; Breast Neoplasm; Lung cancer; Renal cancer; Osteoporosis; Family history of Arthritis; Family history of cancer; Family history of osteoporosis         Social History  Alcohol (Never); Alcohol Use (Never); lives with spouse; ; .; Recreational Drug Use (Never); Retired; Retired.; Tobacco (Never)         Review of Systems  · Constitutional  o Denies  o : fever, chills, weight loss  · Cardiovascular  o Denies  o : chest pain, shortness of breath  · Gastrointestinal  o Denies  o : liver disease, heartburn, nausea, blood in stools  · Genitourinary  o Denies  o : painful urination, blood in urine  · Integument  o Denies  o : rash, itching  · Neurologic  o Denies  o : headache, weakness, loss of consciousness  · Musculoskeletal  o Admits  o : painful, swollen joints  · Psychiatric  o Denies  o : drug/alcohol addiction, anxiety, depression      Vitals  Date Time BP Position Site L\R Cuff Size HR RR TEMP (F) WT  HT  BMI kg/m2 BSA m2 O2 Sat        05/26/2020 10:14 AM      75 - R   255lbs 0oz 5'  9.5\" 37.12 2.38 97 %          Physical Examination  · Constitutional  o Appearance  o : well developed, well-nourished, no obvious deformities present  · Head and Face  o Head  o :   § Inspection  § : normocephalic  o Face  o :   § Inspection  § : no facial lesions  · Eyes  o Conjunctivae  o : conjunctivae normal  o Sclerae  o : sclerae white  · Ears, Nose, Mouth and Throat  o Ears  o :   § External Ears  § : appearance within normal limits  § Hearing  § : intact  o Nose  o :   § External Nose  § : appearance normal  · Neck  o Inspection/Palpation  o : normal appearance  o Range of Motion  o : full range of motion  · Respiratory  o Respiratory Effort  o : breathing unlabored  o Inspection " of Chest  o : normal appearance  o Auscultation of Lungs  o : no audible wheezing or rales  · Cardiovascular  o Heart  o : regular rate  · Gastrointestinal  o Abdominal Examination  o : soft and non-tender  · Skin and Subcutaneous Tissue  o General Inspection  o : intact, no rashes  · Psychiatric  o General  o : Alert and oriented x3  o Judgement and Insight  o : judgment and insight intact  o Mood and Affect  o : mood normal, affect appropriate  · Left Shoulder  o Inspection  o : No atrophy or discoloration. Grape sized mobile mass over anterior deltoid. Full PROM. Pain and weakness with empty can test. Neurovascularly intact.   · Injection Note/Aspiration Note  o Site  o : left shoulder   o Procedure  o : Procedure: After educating the patient, patient gave consent for procedure. After using Chloraprep, the joint space was injected. The patient tolerated the procedure well.   o Medication  o : 80 mg of DepoMedrol with 9cc of 1% Lidocaine          Assessment  · Pain: Shoulder     719.41/M25.519  · Rotator cuff tendinitis, left     726.10/M75.82      Plan  · Orders  o Depo-Medrol injection 80mg () - 719.41/M25.519 - 05/26/2020   Lot [id number] exp 05 21 Manny PARKER  o Shoulder Intra-articular Injection without US Guidance [facility] (33325) - 719.41/M25.519 - 05/26/2020   Lot [id number] exp 07 21 Rubén PARKER  · Medications  o Medications have been Reconciled  o Transition of Care or Provider Policy  · Instructions  o Reviewed the patient's Past Medical, Social, and Family history as well as the ROS at today's visit, no changes.  o Call or return if worsening symptoms.  o Continue HEP. Steroid injection today. He will call for left shoulder CT arthrogram if no relief prior to next appointment.   o Electronically Identified Patient Education Materials Provided Electronically            Electronically Signed by: KEITH Nesbitt-C -Author on May 26, 2020 11:01:07 AM  Electronically Co-signed by: Ophelia  MD Devyn -Reviewer on May 28, 2020 09:23:59 AM

## 2021-05-13 NOTE — PROGRESS NOTES
Progress Note      Patient Name: Jean-Pierre Anaya   Patient ID: [id number]   Sex: Male   YOB: 1955    Primary Care Provider: Ian Wilson MD   Referring Provider: Ian Wilson MD    Visit Date: June 30, 2020    Provider: Jose David Hernández MD   Location: [facility]   Location Address: [address] [place] [address], [place]  [id number]   Location Phone: [phone]          Chief Complaint     Chest pain.    Hypertension.       History Of Present Illness  Jean-Pierre Anaya is a 64 year old /White male with a history of hypertension who has been having some chest pain on and off for the last month, substernal, aching sometimes, nonexertional, relieves spontaneously. Cardiac risk factors: History of hypertension is positive, history of hyperlipidemia is positive, and history of diabetes mellitus is positive.   CURRENT MEDICATIONS: Aspirin 81 mg daily; carvedilol 3.125 mg b.i.d.; meloxicam 7.5 mg daily; fenofibrate 160 mg daily; atorvastatin 20 mg daily; gabapentin 300 mg b.i.d.; fish oil; vitamin D 2,000 units daily; Mucinex 600 mg b.i.d.; montelukast 10 mg daily; fexofenadine 180 mg daily; fluticasone nasal spray; ProAir; allopurinol 300 mg daily; potassium citrate 10 mEq 2 b.i.d.; Humalog 25 units; Toujeo 70 units q. h.s.   PAST MEDICAL HISTORY: Arthritis; Asthma; BPH (benign prostatic hyperplasia); Diabetes mellitus type 2, noninsulin dependent; Hyperlipidemia; Hypertension; Prostate disorder; Renal calculus or stone; Seasonal allergies.   FAMILY HISTORY: Positive for hypertension. Negative for diabetes mellitus or heart disease.   PSYCHOSOCIAL HISTORY: Denies alcohol or tobacco use. Denies mood changes or depression.       Review of Systems  · Cardiovascular  o Admits  o : swelling (feet, ankles, hands), chest pain or angina pectoris   o Denies  o : palpitations (fast, fluttering, or skipping beats), shortness of breath while walking or lying  "flat  · Respiratory  o Admits  o : chronic or frequent cough, asthma or wheezing      Vitals  Date Time BP Position Site L\R Cuff Size HR RR TEMP (F) WT  HT  BMI kg/m2 BSA m2 O2 Sat HC       06/30/2020 11:27 /70 Sitting    66 - R   257lbs 0oz 5'  9\" 37.95 2.38           Physical Examination  · Constitutional  o Appearance  o : Awake, alert, cooperative, pleasant.  · Respiratory  o Inspection of Chest  o : No chest wall deformities, moving equal.  o Auscultation of Lungs  o : Good air entry with vesicular breath sounds.  · Cardiovascular  o Heart  o :   § Auscultation of Heart  § : S1 and S2 regular. No S3. No S4.   o Peripheral Vascular System  o :   § Extremities  § : Peripheral pulses were well felt. No edema. No cyanosis.  · Gastrointestinal  o Abdominal Examination  o : No masses or organomegaly noted.              Assessment     ASSESSMENT & PLAN:    1.  Coronary artery disease with chest pain and positive risk factors.  In view of his multiple risk factors and        persistent chest pain off and on, proceed with cardiac catheterization to evaluate for significant coronary        artery disease.  All the risks, benefits, and alternatives of cardiac catheterization discussed with the patient,        including the risks of myocardial infarction, peripheral vascular complications, CVA, and cardiac arrest.  He        understands and consents for the procedure.  Increase fluid intake prior to the procedure.  2.  Essential hypertension, controlled.  Continue current dose of carvedilol.        Jose David Hernández MD, [facility]  ALLA:sameer             Electronically Signed by: Ramona Nino-, Other -Author on July 6, 2020 06:14:06 AM  Electronically Co-signed by: Jose David Hernández MD -Reviewer on July 8, 2020 11:58:08 AM  "

## 2021-05-13 NOTE — PROGRESS NOTES
Progress Note      Patient Name: Jean-Pierre Anaya   Patient ID: [id number]   Sex: Male   YOB: 1955    Primary Care Provider: Ian Wilson MD   Referring Provider: Ian Wilson MD    Visit Date: July 30, 2020    Provider: Serafin Pereira MD   Location: [facility]   Location Address: [address] [place] [address], [address] [place] [address]  [id number]   Location Phone: [phone]          Chief Complaint  · follow up      History Of Present Illness  The patient is a 64 year old /White male , who presents to follow up on bph,hypogonadism.          psa 0.74 11/26/19 12/30/19 320 testosterone.  Patient has no energy.  Is diabetic.  Is urinating fine.  No nocturia.  He can urinate with no problem in the daytime.  He does not have any erection.  He has no kidney stone pains.  No gross hematuria or dysuria.       Past Medical History  Arthritis; Asthma; Bilateral Carpal Tunnel Syndrome; BPH (benign prostatic hyperplasia); Diabetes; Diabetes mellitus type 2, noninsulin dependent; DM (diabetes mellitus); ED (erectile dysfunction) of organic origin; Hyperlipemia; Hyperlipidemia; Hypertension; Hypertension, Benign Essential; Hypogonadism male; Leg swelling; Neuropathy; Pre-op exam; Prostate Disorder; Renal calculus or stone; Seasonal allergies         Past Surgical History  Carpal Tunnel Release; Colonoscopy; Cystoscopy; Eye Implant; Holmium Lasertripsy; Kidney Stone Surgery, Unspecified; TURP         Medication List  allopurinol 300 mg Oral tablet; aspirin 81 mg Oral tablet,delayed release (DR/EC); carvedilol 3.125 mg oral tablet; fenofibrate 160 mg oral tablet; fexofenadine 180 mg Oral tablet; fluticasone 50 mcg/actuation nasal spray,suspension; gabapentin 300 mg oral capsule; Humalog 100 unit/mL subcutaneous solution; Lipitor 20 mg oral tablet; meloxicam oral; montelukast 10 mg oral tablet; Omega-3 350 mg-235 mg- 90 mg-597 mg oral capsule,delayed release(DR/EC); potassium citrate 10 mEq (1,080 mg)  "oral tablet extended release; ProAir HFA 90 mcg/actuation inhalation HFA aerosol inhaler; Toujeo SoloStar 300 unit/mL (1.5 mL) subcutaneous insulin pen; Vitamin D3 2,000 unit Oral tablet         Allergy List  NO KNOWN DRUG ALLERGIES       Allergies Reconciled  Family Medical History  Cancer, Unspecified; Breast Neoplasm; Lung cancer; Renal Cancer; Osteoporosis; Family history of Arthritis; Family history of cancer; Family history of osteoporosis         Social History  Alcohol (Never); Alcohol Use (Never); lives with spouse; ; .; Recreational Drug Use (Never); Retired; Retired.; Tobacco (Never)         Review of Systems  · Constitutional  o Admits  o : fatigue  o Denies  o : fever, headache, chills  · Eyes  o Denies  o : eye pain, double vision, blurred vision  · HENT  o Denies  o : sinus problems, sore throat, ear infection  · Cardiovascular  o Denies  o : chest pain, high blood pressure, varicosities  · Respiratory  o Denies  o : shortness of breath, wheezing, frequent cough  · Gastrointestinal  o Denies  o : nausea, vomiting, heartburn, indigestion, abdominal pain  · Genitourinary  o Denies  o : urgency, frequency, urinary retention, painful urination  · Integument  o Denies  o : rash, itching, boils  · Neurologic  o Denies  o : tingling or numbness, tremors, dizzy spells  · Musculoskeletal  o Denies  o : joint pain, neck pain, back pain  · Endocrine  o Denies  o : cold intolerance, heat intolerance, tired, excessive thirst, sluggish  · Psychiatric  o Admits  o : feels satisfied with life  o Denies  o : severe depression, concerns with hurting themselves  · Heme-Lymph  o Denies  o : swollen glands, blood clotting problems  · Allergic-Immunologic  o Denies  o : sinus allergy symptoms, hay fever      Vitals  Date Time BP Position Site L\R Cuff Size HR RR TEMP (F) WT  HT  BMI kg/m2 BSA m2 O2 Sat        07/30/2020 03:47 /77 Sitting    81 - R   261lbs 0oz 5'  9\" 38.54 2.4           Physical " Examination  · Constitutional  o Appearance  o : 64-year-old white male who is obese. No anemia jaundice or cyanosis present  · Neck  o Thyroid  o : Normal size without tenderness, nodules or masses  · Respiratory  o Respiratory Effort  o : Breathing is unlabored without accessory muscle use  o Inspection of Chest  o : normal appearance, no retractions  o Auscultation of Lungs  o : Normal breath sounds  · Cardiovascular  o Heart  o :   § Auscultation of Heart  § : regular rate and rhythm, no murmurs, gallops or rubs  o Peripheral Vascular System  o : No abnormalities  · Gastrointestinal  o Abdominal Examination  o : abdomen nontender to palpation, normal bowel sounds, tone normal without rigidity or guarding, no masses present, abdomen obese upon supine  o Liver and spleen  o : No hepatomegaly present. Liver is non-tender to palpation and spleen is not palpable.  o Hernias  o : No abdominal wall hernias are present.  · Genitourinary  o Bladder  o : no abnormalities  o Penis  o : Normal appearance without lesion, discharge or masses.  o Urethral Meatus  o : no abnormalities  o Scrotum and Scrotal Contents  o :   § Scrotum  § : no abnormalities  § Epididymides  § : no abnormalities  § Testes  § : no abnormalities  o Digital Rectal Examination  o :   § Prostate  § : nontender to palpation, size normal, no nodules present, consistency normal  · Lymphatic  o Neck  o : No lymphadenopathy present  o Groin  o : No lymphadenopathy present  · Skin and Subcutaneous Tissue  o General Inspection  o : No rashes, lesions or areas of discoloration present. Skin turgor is normal.  o General Palpation  o : No abnormalities, masses or tenderness on palpation.  · Neurologic  o Mental Status Examination  o : grossly oriented to person, place and time  o Gait and Station  o : normal gait, able to stand without difficulty  · Psychiatric  o Mood and Affect  o : mood normal, affect appropriate      Figure 1.0: Pain Rating Scale-[place]          Results  · In-Office Procedures  o Lab procedure  § Automated dipstick urinalysis with microscopy (39595)   § Color Ur: Yellow   § Clarity Ur: Clear   § Glucose Ur Ql Strip: 100 mg/dL   § Bilirub Ur Ql Strip: Negative   § Ketones Ur Ql Strip: Negative   § Sp Gr Ur Qn: 1.025   § Hgb Ur Ql Strip: Negative   § pH Ur-LsCnc: 6.5   § Prot Ur Ql Strip: 30 mg/dL   § Urobilinogen Ur Strip-mCnc: 4.0 E.U./dL   § Nitrite Ur Ql Strip: Negative   § WBC Est Ur Ql Strip: Negative   § RBC UrnS Qn HPF: 0   § WBC UrnS Qn HPF: 0   § Bacteria UrnS Qn HPF: 0   § Crystals UrnS Qn HPF: 0   § Epithelial Cells (non renal): 0 /HPF  § Epithelial Cells (renal): 0       Assessment  · Prostate Cancer Screening     V76.44/Z12.5  · Renal calculus or stone     592.0/N20.0  · DM (diabetes mellitus)     250.00/E11.9  · BPH (benign prostatic hyperplasia)     600.00/N40.0  · ED (erectile dysfunction) of organic origin     607.84/N52.8    Problems Reconciled  Plan  · Orders  o PSA Ultrasensitive, ANNUAL SCREENING [facility] (40303, ) - V76.44/Z12.5 - 07/30/2020  · Medications  o Medications have been Reconciled  o Transition of Care or Provider Policy  · Instructions  o Patient is in remission from BPH. We will recheck him in 6 months time            Electronically Signed by: Serafin Pereira MD -Author on July 30, 2020 04:26:09 PM

## 2021-05-13 NOTE — PROGRESS NOTES
Progress Note      Patient Name: Jean-Pierre Anaya   Patient ID: [id number]   Sex: Male   YOB: 1955    Primary Care Provider: Ian Wilson MD   Referring Provider: Ian Wilson MD    Visit Date: July 21, 2020    Provider: Jose David Hernández MD   Location: [facility]   Location Address: [address] [place] [address], [facility]   [place]  [id number]   Location Phone: [phone]          Chief Complaint     Hypertension.  Chest pain.       History Of Present Illness  Jean-Pierre Anaya is a 64 year old /White male with a history of hypertension and chest pain. He had a recent cardiac cath done a couple of weeks ago showing normal coronary arteries.   CURRENT MEDICATIONS: Humalog insulin; Toujeo; carvedilol 3.125 mg b.i.d.; meloxicam 7.5 mg daily; fenofibrate 160 mg daily; atorvastatin 20 mg daily; gabapentin 300 mg b.i.d.; Omega-3 fish oil b.i.d.; vitamin D daily; aspirin 81 mg daily; Mucinex b.i.d.; montelukast 10 mg daily; fexofenadine 180 mg daily; fluticasone nasal spray; ProAir; allopurinol 300 mg daily; potassium citrate 10 mEq 2 b.i.d.   PAST MEDICAL HISTORY: Arthritis; Asthma; BPH (benign prostatic hyperplasia); Chronic kidney disease; Diabetes mellitus type 2, noninsulin dependent; Hyperlipidemia; Hypertension; Prostate disorder; Renal calculus or stone; Seasonal allergies.   FAMILY HISTORY: Positive for hypertension. Negative for diabetes mellitus or heart disease.   PSYCHOSOCIAL HISTORY: Denies alcohol or tobacco use. Denies mood changes or depression.       Review of Systems  · Cardiovascular  o Admits  o : swelling (feet, ankles, hands)  o Denies  o : palpitations (fast, fluttering, or skipping beats), shortness of breath while walking or lying flat, chest pain or angina pectoris   · Respiratory  o Admits  o : asthma or wheezing  o Denies  o : chronic or frequent cough      Vitals  Date Time BP Position Site L\R Cuff Size HR RR TEMP (F) WT  HT  BMI kg/m2 BSA m2 O2 Sat HC   "     07/21/2020 10:37 /62 Sitting    72 - R   261lbs 0oz 5'  9\" 38.54 2.4           Physical Examination  · Constitutional  o Appearance  o : Awake, alert, cooperative, pleasant.  · Respiratory  o Inspection of Chest  o : No chest wall deformities, moving equal.  o Auscultation of Lungs  o : Good air entry with vesicular breath sounds.  · Cardiovascular  o Heart  o :   § Auscultation of Heart  § : S1 and S2 regular. No S3. No S4.   o Peripheral Vascular System  o :   § Extremities  § : Peripheral pulses were well felt. No edema. No cyanosis.  · Gastrointestinal  o Abdominal Examination  o : No masses or organomegaly noted.          Assessment     ASSESSMENT & PLAN:    1.  Coronary artery disease with chest pain and normal coronaries.  Discussed with the patient the results of        his cardiac catheterization which showed normal coronary arteries.  2.  Type 2 diabetes mellitus without complications.  Managed by his PMD.  3.  Chronic kidney disease, stage 2-3.  Liver function managed by his PMD.  I asked him to increase his fluid        intake.    4.   Hyperlipidemia.  Continue Lipitor, managed by his PMD.  5.  See me back in 6 months.      Jose David Hernández MD, FACC  ALLA:vm             Electronically Signed by: Ramona Nino-, Other -Author on July 22, 2020 03:33:54 PM  Electronically Co-signed by: Jose David Hernández MD -Reviewer on July 24, 2020 08:33:34 AM  "

## 2021-05-14 VITALS
DIASTOLIC BLOOD PRESSURE: 70 MMHG | HEIGHT: 69 IN | HEART RATE: 66 BPM | BODY MASS INDEX: 37.18 KG/M2 | WEIGHT: 251 LBS | SYSTOLIC BLOOD PRESSURE: 142 MMHG

## 2021-05-14 VITALS
HEIGHT: 69 IN | HEART RATE: 72 BPM | SYSTOLIC BLOOD PRESSURE: 161 MMHG | WEIGHT: 251 LBS | BODY MASS INDEX: 37.18 KG/M2 | DIASTOLIC BLOOD PRESSURE: 73 MMHG | TEMPERATURE: 97 F

## 2021-05-14 NOTE — PROGRESS NOTES
Progress Note      Patient Name: Jean-Pierre Anaya   Patient ID: [id number]   Sex: Male   YOB: 1955    Primary Care Provider: Ian Wilson MD   Referring Provider: Ian Wilson MD    Visit Date: February 17, 2021    Provider: Serafin Pereira MD   Location: [facility]   Location Address: [address] [place] [address], [address] [facility]  [place]  [id number]   Location Phone: [phone]          Chief Complaint  · follow up       History Of Present Illness  The patient is a 65 year old /White male , who presents to follow up on bph, psa 7/30/20 0.68. Patient has been urinating with no problem. Is on Ozempic for diabetes which is controlling his sugar better. Still do not have any erection         Past Medical History  Arthritis; Asthma; Bilateral Carpal Tunnel Syndrome; BPH (benign prostatic hyperplasia); Diabetes; Diabetes mellitus type 2, noninsulin dependent; DM (diabetes mellitus); ED (erectile dysfunction) of organic origin; Hyperlipemia; Hyperlipidemia; Hypertension; Hypertension, Benign Essential; Hypogonadism male; Leg swelling; Neuropathy; Pre-op exam; Prostate Disorder; Renal calculus or stone; Seasonal allergies         Past Surgical History  Carpal Tunnel Release; Colonoscopy; Cystoscopy; Eye Implant; Holmium Lasertripsy; Kidney Stone Surgery, Unspecified; TURP         Medication List  allopurinol 300 mg Oral tablet; aspirin 81 mg Oral tablet,delayed release (DR/EC); carvedilol 3.125 mg oral tablet; fenofibrate 160 mg oral tablet; fexofenadine 180 mg Oral tablet; fluticasone 50 mcg/actuation nasal spray,suspension; gabapentin 300 mg oral capsule; Humalog 100 unit/mL subcutaneous solution; Lipitor 20 mg oral tablet; meloxicam oral; montelukast 10 mg oral tablet; Omega-3 350 mg-235 mg- 90 mg-597 mg oral capsule,delayed release(DR/EC); potassium citrate 10 mEq (1,080 mg) oral tablet extended release; ProAir HFA 90 mcg/actuation inhalation HFA aerosol inhaler; Toujeo SoloStar 300 unit/mL (1.5 mL)  "subcutaneous insulin pen; Vitamin D3 2,000 unit Oral tablet         Allergy List  NO KNOWN DRUG ALLERGIES         Family Medical History  Cancer, Unspecified; Breast Neoplasm; Lung cancer; Renal Cancer; Osteoporosis; Family history of Arthritis; Family history of cancer; Family history of osteoporosis         Social History  Alcohol (Never); Alcohol Use (Never); lives with spouse; ; .; Recreational Drug Use (Never); Retired; Retired.; Tobacco (Never)         Review of Systems  · Constitutional  o Denies  o : fever, headache, chills  · Eyes  o Denies  o : eye pain, double vision, blurred vision  · HENT  o Denies  o : sinus problems, sore throat, ear infection  · Cardiovascular  o Denies  o : chest pain, high blood pressure, varicosities  · Respiratory  o Denies  o : shortness of breath, wheezing, frequent cough  · Gastrointestinal  o Denies  o : nausea, vomiting, heartburn, indigestion, abdominal pain  · Genitourinary  o Denies  o : urgency, frequency, urinary retention, painful urination  · Integument  o Denies  o : rash, itching, boils  · Neurologic  o Denies  o : tingling or numbness, tremors, dizzy spells  · Musculoskeletal  o Denies  o : joint pain, neck pain, back pain  · Endocrine  o Denies  o : cold intolerance, heat intolerance, tired, excessive thirst, sluggish  · Psychiatric  o Admits  o : feels satisfied with life  o Denies  o : severe depression, concerns with hurting themselves  · Heme-Lymph  o Denies  o : swollen glands, blood clotting problems  · Allergic-Immunologic  o Denies  o : sinus allergy symptoms, hay fever  · All Others Negative      Vitals  Date Time BP Position Site L\R Cuff Size HR RR TEMP (F) WT  HT  BMI kg/m2 BSA m2 O2 Sat FR L/min FiO2        02/17/2021 10:57 /73 Sitting    72 - R  97 250lbs 16oz 5'  9\" 37.07 2.35             Physical Examination  · Constitutional  o Appearance  o : Well nourished, well developed patient in no acute distress. Ambulating without " difficulty.  · Neck  o Thyroid  o : Normal size without tenderness, nodules or masses  · Respiratory  o Respiratory Effort  o : Breathing is unlabored without accessory muscle use  o Inspection of Chest  o : normal appearance, no retractions  o Auscultation of Lungs  o : Normal breath sounds  · Cardiovascular  o Heart  o :   § Auscultation of Heart  § : regular rate and rhythm, no murmurs, gallops or rubs  o Peripheral Vascular System  o : No abnormalities  · Gastrointestinal  o Abdominal Examination  o : Scaphoid abdomen which is non-tender to palpation with normal tone and without rigidity or guarding. Normal bowel sounds. No masses present.  o Liver and spleen  o : No hepatomegaly present. Liver is non-tender to palpation and spleen is not palpable.  · Genitourinary  o Bladder  o : no abnormalities  o Penis  o : Normal appearance without lesion, discharge or masses.  o Urethral Meatus  o : no abnormalities  o Scrotum and Scrotal Contents  o :   § Scrotum  § : no abnormalities  § Epididymides  § : no abnormalities  § Testes  § : no abnormalities  o Digital Rectal Examination  o :   § Prostate  § : nontender to palpation, size normal, no nodules present, consistency normal  · Lymphatic  o Neck  o : No lymphadenopathy present  o Groin  o : No lymphadenopathy present  · Skin and Subcutaneous Tissue  o General Inspection  o : No rashes, lesions or areas of discoloration present. Skin turgor is normal.  o General Palpation  o : No abnormalities, masses or tenderness on palpation.  · Neurologic  o Mental Status Examination  o : grossly oriented to person, place and time  o Gait and Station  o : normal gait, able to stand without difficulty  · Psychiatric  o Mood and Affect  o : mood normal, affect appropriate      Figure 1.0: Pain Rating Scale-[place]         Results  · In-Office Procedures  o Lab procedure  § Automated dipstick urinalysis with microscopy ([id number])   § Color Ur: Yellow   § Clarity Ur: Clear   § Glucose Ur  Ql Strip: Negative   § Bilirub Ur Ql Strip: Negative   § Ketones Ur Ql Strip: Negative   § Sp Gr Ur Qn: 1.025   § Hgb Ur Ql Strip: Negative   § pH Ur-LsCnc: 5.5   § Prot Ur Ql Strip: Negative   § Urobilinogen Ur Strip-mCnc: 1.0 E.U./dL   § Nitrite Ur Ql Strip: Negative   § WBC Est Ur Ql Strip: Negative   § RBC UrnS Qn HPF: 0-1   § WBC UrnS Qn HPF: 0-1   § Bacteria UrnS Qn HPF: 1+   § Crystals UrnS Qn HPF: 0   § Epithelial Cells (non renal): 0 /HPF  § Epithelial Cells (renal): 0       Assessment  · Prostate Cancer Screening     V76.44/Z12.5  · Renal calculus or stone     592.0/N20.0  · DM (diabetes mellitus)     250.00/E11.9  · BPH (benign prostatic hyperplasia)     600.00/N40.0  · ED (erectile dysfunction) of organic origin     607.84/N52.8  · Bacteriuria     791.9/R82.71      Plan  · Orders  o PSA Ultrasensitive, ANNUAL SCREENING [facility] (71360, ) - - 07/26/2021  o Urine Culture [facility] ([address]248) - 600.00/N40.0, 592.0/N20.0 - 02/17/2021  · Instructions  o We just discussed penile implant for erection. He is still thinking about it. Risk benefits have been discussed and is going to call us if he decides to have penile implant            Electronically Signed by: Serafin Pereira MD -Author on February 17, 2021 01:20:01 PM

## 2021-05-15 VITALS — BODY MASS INDEX: 37.77 KG/M2 | WEIGHT: 255 LBS | HEART RATE: 69 BPM | HEIGHT: 69 IN | OXYGEN SATURATION: 97 %

## 2021-05-15 VITALS
DIASTOLIC BLOOD PRESSURE: 67 MMHG | HEART RATE: 63 BPM | BODY MASS INDEX: 38.36 KG/M2 | WEIGHT: 259 LBS | TEMPERATURE: 98.5 F | SYSTOLIC BLOOD PRESSURE: 148 MMHG | HEIGHT: 69 IN

## 2021-05-15 VITALS — OXYGEN SATURATION: 96 % | HEART RATE: 72 BPM | HEIGHT: 69 IN

## 2021-05-15 VITALS
SYSTOLIC BLOOD PRESSURE: 138 MMHG | BODY MASS INDEX: 38.66 KG/M2 | HEIGHT: 69 IN | DIASTOLIC BLOOD PRESSURE: 62 MMHG | HEART RATE: 72 BPM | WEIGHT: 261 LBS

## 2021-05-15 VITALS
HEART RATE: 66 BPM | WEIGHT: 257 LBS | BODY MASS INDEX: 38.06 KG/M2 | HEIGHT: 69 IN | SYSTOLIC BLOOD PRESSURE: 128 MMHG | DIASTOLIC BLOOD PRESSURE: 70 MMHG

## 2021-05-15 VITALS
SYSTOLIC BLOOD PRESSURE: 147 MMHG | HEART RATE: 81 BPM | DIASTOLIC BLOOD PRESSURE: 77 MMHG | WEIGHT: 261 LBS | BODY MASS INDEX: 38.66 KG/M2 | HEIGHT: 69 IN

## 2021-05-15 VITALS — HEIGHT: 69 IN | HEART RATE: 69 BPM | WEIGHT: 267 LBS | OXYGEN SATURATION: 96 % | BODY MASS INDEX: 39.55 KG/M2

## 2021-05-15 VITALS — BODY MASS INDEX: 37.77 KG/M2 | OXYGEN SATURATION: 97 % | HEART RATE: 75 BPM | HEIGHT: 69 IN | WEIGHT: 255 LBS

## 2021-05-15 VITALS — BODY MASS INDEX: 38.36 KG/M2 | WEIGHT: 259 LBS | HEART RATE: 66 BPM | HEIGHT: 69 IN | OXYGEN SATURATION: 94 %

## 2021-05-15 VITALS
BODY MASS INDEX: 38.36 KG/M2 | HEIGHT: 69 IN | WEIGHT: 259 LBS | SYSTOLIC BLOOD PRESSURE: 178 MMHG | DIASTOLIC BLOOD PRESSURE: 80 MMHG | HEART RATE: 76 BPM

## 2021-05-16 VITALS
WEIGHT: 258 LBS | HEIGHT: 69 IN | SYSTOLIC BLOOD PRESSURE: 138 MMHG | HEART RATE: 74 BPM | DIASTOLIC BLOOD PRESSURE: 94 MMHG | BODY MASS INDEX: 38.21 KG/M2

## 2021-05-16 VITALS
HEIGHT: 69 IN | HEART RATE: 74 BPM | WEIGHT: 262 LBS | DIASTOLIC BLOOD PRESSURE: 92 MMHG | SYSTOLIC BLOOD PRESSURE: 144 MMHG | BODY MASS INDEX: 38.8 KG/M2

## 2021-05-16 VITALS
BODY MASS INDEX: 38.8 KG/M2 | SYSTOLIC BLOOD PRESSURE: 154 MMHG | HEART RATE: 88 BPM | DIASTOLIC BLOOD PRESSURE: 82 MMHG | TEMPERATURE: 98.6 F | HEIGHT: 69 IN | WEIGHT: 262 LBS

## 2021-05-16 VITALS — OXYGEN SATURATION: 98 % | HEIGHT: 69 IN | WEIGHT: 256 LBS | BODY MASS INDEX: 37.92 KG/M2 | HEART RATE: 75 BPM

## 2021-05-16 VITALS
SYSTOLIC BLOOD PRESSURE: 139 MMHG | DIASTOLIC BLOOD PRESSURE: 75 MMHG | BODY MASS INDEX: 38.11 KG/M2 | WEIGHT: 257.31 LBS | HEIGHT: 69 IN

## 2021-05-28 VITALS
TEMPERATURE: 98.1 F | HEART RATE: 81 BPM | WEIGHT: 257.4 LBS | HEIGHT: 70 IN | WEIGHT: 256.4 LBS | HEIGHT: 70 IN | OXYGEN SATURATION: 96 % | SYSTOLIC BLOOD PRESSURE: 135 MMHG | SYSTOLIC BLOOD PRESSURE: 159 MMHG | WEIGHT: 262 LBS | RESPIRATION RATE: 18 BRPM | RESPIRATION RATE: 17 BRPM | SYSTOLIC BLOOD PRESSURE: 141 MMHG | DIASTOLIC BLOOD PRESSURE: 73 MMHG | DIASTOLIC BLOOD PRESSURE: 65 MMHG | HEART RATE: 75 BPM | OXYGEN SATURATION: 100 % | HEIGHT: 70 IN | RESPIRATION RATE: 18 BRPM | BODY MASS INDEX: 36.85 KG/M2 | HEART RATE: 60 BPM | BODY MASS INDEX: 37.51 KG/M2 | BODY MASS INDEX: 36.71 KG/M2 | TEMPERATURE: 98.7 F | TEMPERATURE: 98.1 F | OXYGEN SATURATION: 94 % | DIASTOLIC BLOOD PRESSURE: 73 MMHG

## 2021-05-28 NOTE — PROGRESS NOTES
Patient: DAYRON GOTTLIEB     Acct: [id number]     Report: #[id number]  UNIT #: [id number]     : 1955    Encounter Date:2019  PRIMARY CARE:   ***Signed***  --------------------------------------------------------------------------------------------------------------------  Progress Note      DATE: 19      Primary Care Provider:  Ian Wilson      Referring Provider:  Ian Wilson      Chief Complaint      FU Erythrocytosis            Subjective      63-year-old white male with history of erythrocytosis most probably secondary to    sleep apnea.  Patient's last phlebotomy was in 2017.  Patient had been     seen by a couple of sleep apnea doctors.  During his last encounter with a sleep    apnea doctor he was requested to increase his CPAP pressure which he was not     prone to do.  Patient claims that by positioning his head he was able to sleep     better and wake up more refreshed.  He believes this is the reason why he is     hemoglobin hematocrit has stayed normal peer            Past Med/Surg History            Past Med/Surg History:   Hypertension             Diabetes Mellitus             Lung Disease (Asthma)             Other (Kidney Stones, hyperlipidemia, BPH, vitamin D deficiency, gout)            Social History      Social History:  No Tobacco Use, No Alcohol Use, No Recreational Drug use            Allergies      Coded Allergies:             NO KNOWN ALLERGIES (Unverified , 19)            Medications      Medications    Last Reconciled on 19 16:06 by KWESI VIVAR MD      Albuterol (Proair HFA) 8.5 Gm Inh      2 PUFFS INH RTQ6H, #1 INH 0 Refills         Reported         19       Meloxicam (Meloxicam*) 7.5 Mg Tablet      3.75 MG PO HS, #30 TAB 0 Refills         Reported         19       Potassium Citrate (Urocit-K) 10 Meq Tablet.er      10 MEQ PO BID for 30 Days, #60 TAB.SA         Reported         18       Insulin Lispro (HumaLOG KWIKPEN 100 UNITS/ML)  100 Units/Ml Insuln.pen      25 UNITS SUBQ TID MEALS, #1 BOX 0 Refills         Reported         4/21/17       Insulin Glargine,Hum.rec.anlog (Toujeo Solostar) 300 Unit/1 Ml Insuln.pen      70 UNITS SUBQ HS, #1 INSULN.PEN         Reported         12/8/16       Fexofenadine Hcl (Fexofenadine Hcl) 180 Mg Tablet      180 MG PO QDAY, #30 TAB 0 Refills         Reported         2/5/16       Jad-Fluticasone (Fluticasone 50 mcg) 16 Gm Naspr      2 PUFFS NARE EACH QDAY, #1 BOTTLE 0 Refills         Reported         2/5/16       Atorvastatin (Atorvastatin) 20 Mg Tab      20 MG PO HS, #30 TAB 0 Refills         Reported         2/5/16       Fenofibrate (Fenofibrate*) 160 Mg Tablet      160 MG PO HS, TAB         Reported         2/5/16       Cholecalciferol (Vitamin D3) (Vitamin D3) 2,000 Unit Cap      2000 UNITS PO QDAY, #30 CAP 0 Refills         Reported         2/5/16       Carvedilol (Coreg) 3.125 Mg Tablet      3.125 MG PO BID, #60 TAB 0 Refills         Reported         2/5/16       Montelukast Sodium (Singulair*) 10 Mg Tab      10 MG PO QDAY, TAB         Reported         3/23/11       Gabapentin (Gabapentin) 300 Mg Capsule      300 MG PO BID         Reported         3/23/11       Aspirin EC (Aspirin EC) 81 Mg Tabec      81 MG PO HS         Reported         7/13/08       Allopurinol (Allopurinol) 300 Mg Tablet      300 MG PO QDAY         Reported         7/13/08       Omega-3 Fatty Acids (Lovaza 1000 Mg) 1,000 Mg Cap      1 TAB PO BID         Reported         7/13/08      Current Medications      Current Medications Reviewed 7/17/19            Pain Assessment      Pain Intensity:  0      Description:  None            Review of Systems      General:  No Anxiety; Fatigue Scale: (0), Pain Scale: (0)      HEENT:  No Dysphagia      Respiratory:  No Cough      Cardiovascular:  No Chest Pain      Gastrointestinal:  No Nausea; Appetite Good (good)      Genitourinary:  No Nocturia      Musculoskeletal:  No Joint Effusions       Endocrine:  No Heat Intolerance, No Cold Intolerance      Hematologic:  No Bleeding      Allergic/Immunologic:  No Hives      Psychological:  No Anxiety      Neurological:  Headaches; No Dizziness      Skin:  No Rash, No Open Wounds            Vitals      Height 5 ft 9.5 in / 176.53 cm      Weight 256 lbs 6.4 oz / 116.457219 kg      BSA 2.31 m2      BMI 37.3 kg/m2      Temperature 98.1 F / 36.72 C      Pulse 75      Respirations 18      Blood Pressure 159/73      Pulse Oximetry 100%            Exam      Constitutional:  No acute distress, Conversant, Pleasant      Eyes:  Anicteric sclerae, Palpebral Conjunctivae (Pink), JACOB      HENT:  Oropharynx clear; No Erythema; Buccal mucosae (Pink)      Neck:  Supple, Full Range of Motion      Cardiovascular:  RRR; No Murmurs; Normal PMI; No Peripheral Edema      Lungs:  Clear to Ausculation, Normal Respiratory Effort      Abdomen:  Soft, NABS; No Masses, No Tenderness      Chest:  Other (Symmetrical)      Lymphatic:  No Neck      Extremities:  No digital cyanosis, No digital ischemia, Normal gait, Other (No     deformity)      Neurological:  Cranial Nerve II-XII (Intact); No Focal Sensory deficits      Psychological:  Appropriate affect, Appropriate mood, Intact judgement, Alert      Skin:  Other (No dermatosis)            Lab Results      Laboratory Tests      7/9/19 14:31            Laboratory Tests            Test       7/9/19      14:31 7/11/19      21:57             White Blood Count       7.91 10*3/uL      (4.80-10.80)                    Red Blood Count       5.59 10*6/uL      (4.70-6.10)                    Hemoglobin       16.10 g/dL      (14.00-18.00)                    Hematocrit       48.6 %      (42.0-52.0)                    Mean Corpuscular Volume       86.9 fL      (80.0-96.0)                    Mean Corpuscular Hemoglobin       28.8 pg      (27.0-31.0)                    Mean Corpuscular Hemoglobin      Concent 33.1      (33.0-37.0)                    Red  Cell Distribution Width       12.1 %      (11.6-14.4)                    RDW Standard Deviation       38.6 fL      (35.1-43.9)                    Platelet Count       221.00 10*3/uL      (130..00)                    Mean Platelet Volume       11.9 fL      (9.4-12.4)                    Granulocytes (%)       66.1 %      (30.0-85.0)                    Lymphocytes (%) (Auto)       22.40 %      (20.00-45.00)                    Monocytes (%) (Auto)       8.10 %      (3.00-10.00)                    Eosinophils (%) (Auto)       1.90 %      (0.00-7.00)                    Basophils (%) (Auto)       0.50 %      (0.00-3.00)                    Granulocytes #       5.23 10*3/uL      (2.00-8.00)                    Lymphocytes # (Auto)       1.77 10*3/uL      (1.00-5.00)                    Monocytes # (Auto)       0.64 10*3/uL      (0.20-1.20)                    Eosinophils # (Auto)       0.15 10*3/uL      (0.00-0.70)                    Basophils # (Auto)       0.04 10*3/uL      (0.00-0.20)                    Immature Granulocytes %       1.0 %      (0.0-1.8)                    Nucleated Red Blood Cells %       0.00 %      (0.00-0.70)                    Immature Granulocytes #       0.08 10*3/uL      (0.00-0.20)                    Lab Scanned Report              LAB FINAL      CUMULATIVES -            Impression/Problem List      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  Multiple CT scans  done in the past.      Notes      Discontinued Medications      * oxyCODONE-Acetaminophen 5-325 MG 1 EACH TABLET: 1 TAB PO Q8 PRN PAIN #31            Plan      Return in 12 months with CBC or as needed.  Patient is able to control his sleep    apnea with some maneuvering of his sleeping position.            Patient Education:        High Blood Pressure            PREVENTION      Influenza Vaccine  Declined:  No      2 or More Falls Past Year?:  No      Fall Past Year with Injury?:  No      Chart initiated by      DINO George MA                 Disclaimer: Converted document may not contain table formatting or lab diagrams. Please see [employer] for the authenticated document.

## 2021-05-28 NOTE — PROGRESS NOTES
Patient: DAYRON GOTTLIEB     Acct: [id number]     Report: #[id number]  UNIT #: [id number]     : 1955    Encounter Date:2019  PRIMARY CARE:   ***Signed***  --------------------------------------------------------------------------------------------------------------------  Progress Note      DATE: 19      Primary Care Provider:  Ian Wilson      Referring Provider:  Ian Wilson      Chief Complaint      FU Erythrocytosis            Subjective      63-year-old white male has history of erythrocytosis and had received     phlebotomies in the past.  His hematocrit had gone up as high as 58.3 and     hemoglobin was 19.5.  Last phlebotomy was performed on 10/20/2017 at which time     patient's hemoglobin was 17.4 and hematocrit was 51.3.            Patient claims he is doing all right except for sinusitis.  He apparently has     not gone out of his house since the holidays because he felt sick.  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            Past Med/Surg History            Past Med/Surg History:   Hypertension             Diabetes Mellitus             Lung Disease (Asthma)             Other (Kidney Stones, hyperlipidemia, BPH, vitamin D deficiency, gout)            Social History      Social History:  No Tobacco Use, No Alcohol Use, No Recreational Drug use            Allergies      Coded Allergies:             NO KNOWN ALLERGIES (Unverified , 17)            Medications      Medications    Last Reconciled on 19 12:21 by KWESI VIVAR MD      Cephalexin (CEPHALEXIN) 500 Mg Capsule      500 MG PO BID, CAP 0 Refills         Reported         19       Potassium Citrate (Urocit-K) 10 Meq Tablet.er      10 MEQ PO BID for 30 Days, #60 TAB.SA         Reported         18       Insulin Lispro (HumaLOG KWIKPEN 100 UNITS/ML) 100 Units/Ml Insuln.pen      25 UNITS SUBQ TID MEALS, #1 BOX 0 Refills         Reported         17       Insulin Glargine,Hum.rec.anlog (Toujeo  Solostar) 300 Unit/1 Ml Insuln.pen      60 UNITS SUBQ HS, #1 INSULN.PEN         Reported         12/8/16       Fexofenadine Hcl (Fexofenadine Hcl) 180 Mg Tablet      180 MG PO QDAY, #30 TAB 0 Refills         Reported         2/5/16       Jad-Fluticasone (Fluticasone 50 mcg) 16 Gm Naspr      2 PUFFS NARE EACH QDAY, #1 BOTTLE 0 Refills         Reported         2/5/16       Atorvastatin (Atorvastatin) 20 Mg Tab      20 MG PO HS, #30 TAB 0 Refills         Reported         2/5/16       Fenofibrate (Fenofibrate*) 160 Mg Tablet      160 MG PO HS, TAB         Reported         2/5/16       Cholecalciferol (Vitamin D*) 2,000 Unit Cap      2000 UNITS PO QDAY, #30 CAP 0 Refills         Reported         2/5/16       Carvedilol (Coreg) 3.125 Mg Tablet      3.125 MG PO BID, #60 TAB 0 Refills         Reported         2/5/16       Albuterol (Proair Inhaler) 1 Puff Inh      1 PUFF IH Q4-6HPRN, #1 INH         Reported         3/23/11       Montelukast Sodium (Singulair*) 10 Mg Tab      10 MG PO QDAY, TAB         Reported         3/23/11       Gabapentin (Gabapentin) 300 Mg Capsule      300 MG PO BID         Reported         3/23/11       Aspirin EC (Aspirin EC) 81 Mg Tabec      81 MG PO HS         Reported         7/13/08       Meloxicam (Mobic*) 7.5 Mg Tablet      0.5 TAB PO HS         Reported         7/13/08       Allopurinol (Allopurinol) 300 Mg Tablet      300 MG PO QDAY         Reported         7/13/08       Omega-3 Fatty Acids (Lovaza 1000 Mg) 1,000 Mg Cap      1 TAB PO BID         Reported         7/13/08      Current Medications      Current Medications Reviewed 1/7/19            Pain Assessment      Pain Intensity:  0      Description:  None            Review of Systems      General:  No Anxiety; Fatigue Scale: (5), Pain Scale: (0)      HEENT:  No Dysphagia, No Hearing Changes      Respiratory:  Cough; No Shortness of Air; Sputum Changes (yellow); No Wheezing;     Congestion (sinus)      Cardiovascular:  No Chest Pain, No  Pedal Edema      Gastrointestinal:  No Nausea, No Vomiting, No Constipation, No Diarrhea;     Appetite Good (good)      Genitourinary:  No Nocturia, No Dysuria      Musculoskeletal:  No Joint Effusions, No Joint Tenderness      Endocrine:  No Heat Intolerance, No Cold Intolerance      Hematologic:  No Bleeding, No Bruising      Allergic/Immunologic:  No Hives, No Throat closing off      Psychological:  No Anxiety, No Depression      Neurological:  Headaches; No Dizziness      Skin:  No Rash, No Open Wounds            Vitals      Height 5 ft 9.5 in / 176.53 cm      Weight 257 lbs 6.4 oz / 116.567478 kg      BSA 2.31 m2      BMI 37.5 kg/m2      Temperature 98.7 F / 37.06 C      Pulse 81      Respirations 17      Blood Pressure 135/65      Pulse Oximetry 94%            Exam      Constitutional:  No acute distress, Conversant, Pleasant      Eyes:  Anicteric sclerae, Palpebral Conjunctivae (Pain), JACOB      HENT:  Oropharynx clear; No Erythema; Buccal mucosae      Neck:  Supple, Full Range of Motion      Cardiovascular:  RRR; No Murmurs; Normal PMI; No Peripheral Edema      Lungs:  Clear to Ausculation, Normal Respiratory Effort, Other (Speaking full     sentences)      Abdomen:  Soft, NABS; No Masses, No Tenderness      Chest:  Other (Symmetrical and equal)      Lymphatic:  No Neck, No Axillae      Extremities:  No digital cyanosis, No digital ischemia, Normal gait, Other (No     deformity)      Neurological:  Cranial Nerve II-XII (Intact); No Focal Sensory deficits      Psychological:  Appropriate affect, Appropriate mood, Intact judgement, Alert      Skin:  Normal temperature, Other (No active dermatosis)            Lab Results      Laboratory Tests      1/4/19 15:15            Laboratory Tests            Test       1/4/19      15:15             White Blood Count       7.41 10*3/uL      (4.80-10.80)             Red Blood Count       5.58 10*6/uL      (4.70-6.10)             Hemoglobin       16.50 g/dL       (14.00-18.00)             Hematocrit       47.3 %      (42.0-52.0)             Mean Corpuscular Volume       84.8 fL      (80.0-96.0)             Mean Corpuscular Hemoglobin       29.6 pg      (27.0-31.0)             Mean Corpuscular Hemoglobin      Concent 34.9 %      (33.0-37.0)             Red Cell Distribution Width       11.8 %      (11.5-14.5)             Platelet Count       226.00 10*3/uL      (130..00)             Mean Platelet Volume       8.6 fL      (7.4-10.4)             Granulocytes (%)       67.5 %      (30.0-85.0)             Lymphocytes (%) (Auto)       21.70 %      (20.00-45.00)             Monocytes (%) (Auto)       8.83 %      (3.00-10.00)             Eosinophils (%) (Auto)       1.80 %      (0.00-7.00)             Basophils (%) (Auto)       0.09 %      (0.00-3.00)             Granulocytes #       5.01 10*3/uL      (2.00-8.00)             Lymphocytes # (Auto)       1.61 10*3/uL      (1.00-5.00)             Monocytes # (Auto)       0.66 10*3/uL      (0.20-1.20)             Eosinophils # (Auto)       0.13 10*3/uL      (0.00-0.70)             Basophils # (Auto)       0.01 10*3/uL      (0.00-0.20)             Nucleated Red Blood Cells %       0.00 %      (0.00-0.01)            Impression/Problem List      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  Multiple CT scans  done in the past.      Notes      New Medications      * CEPHALEXIN 500 MG CAPSULE: 500 MG PO BID            Plan      Return in 6 months with CBC.      Family physician wanted him to follow-up with me for his CBCs.            PREVENTION      Hx Influenza Vaccination:  Yes (2017)      Influenza Vaccine Declined:  No      2 or More Falls Past Year?:  No      Fall Past Year with Injury?:  No      Hx Pneumococcal Vaccination:  Yes (NOT SURE WHEN)      Chart initiated by      DINO George MA                  Disclaimer: Converted document may not contain table formatting or lab diagrams. Please see [employer] for the authenticated document.

## 2021-05-28 NOTE — PROGRESS NOTES
Patient: DAYRON GOTTLIEB     Acct: [id number]     Report: #[id number]  UNIT #: [id number]     : 1955    Encounter Date:2018  PRIMARY CARE:   ***Signed***  --------------------------------------------------------------------------------------------------------------------  Progress Note      DATE: 18      Primary Care Provider:  Ian Wilson      Referring Provider:  Ian Wilson      Chief Complaint      Erythrocytosis follow-up            Subjective      62-year-old white male has history of erythrocytosis hematocrit up to 58.  This     is thought to be secondary to sleep apnea.  Patient's ROLAND 2 mutation is     negative.            Patient was phlebotomized in the last phlebotomy was done on 2017.            Patient's feeling well.            Past Med/Surg History            Past Med/Surg History:   Hypertension             Diabetes Mellitus             Lung Disease (Asthma)             Other (Kidney Stones, hyperlipidemia, BPH, vitamin D deficiency, gout)            Social History      Social History:  No Tobacco Use, No Alcohol Use, No Recreational Drug use            Allergies      Coded Allergies:             NO KNOWN ALLERGIES (Unverified , 17)            Medications      Medications    Last Reconciled on 18 13:34 by KWESI VIVAR MD      Potassium Citrate (Urocit-K) 10 Meq Tablet.er      10 MEQ PO BID for 30 Days, #60 TAB.SA         Reported         18       Insulin Lispro (HumaLOG KWIKPEN 100 UNITS/ML) 100 Units/Ml Insuln.pen      25 UNITS SUBQ TID MEALS, #1 BOX 0 Refills         Reported         17       Insulin Glargine,Hum.rec.anlog (Toujeo Solostar) 300 Unit/1 Ml Insuln.pen      60 UNITS SUBQ HS, #1 INSULN.PEN         Reported         16       Fexofenadine Hcl (Fexofenadine Hcl) 180 Mg Tablet      180 MG PO QDAY, #30 TAB 0 Refills         Reported         16       Jad-Fluticasone (Fluticasone 50 mcg) 16 Gm Naspr      2 PUFFS NARE EACH QDAY, #1 BOTTLE  0 Refills         Reported         2/5/16       Atorvastatin (Atorvastatin) 20 Mg Tab      20 MG PO HS, #30 TAB 0 Refills         Reported         2/5/16       Fenofibrate (Fenofibrate*) 160 Mg Tablet      160 MG PO HS, TAB         Reported         2/5/16       Cholecalciferol (Vitamin D*) 2,000 Unit Cap      2000 UNITS PO QDAY, #30 CAP 0 Refills         Reported         2/5/16       Carvedilol (Coreg) 3.125 Mg Tablet      3.125 MG PO BID, #60 TAB 0 Refills         Reported         2/5/16       Albuterol (Proair Inhaler) 1 Puff Inh      1 PUFF IH Q4-6HPRN, #1 INH         Reported         3/23/11       Montelukast Sodium (Singulair*) 10 Mg Tab      10 MG PO QDAY, TAB         Reported         3/23/11       Gabapentin (Gabapentin) 300 Mg Capsule      300 MG PO BID         Reported         3/23/11       Aspirin EC (Aspirin EC*) 81 Mg Tabec      81 MG PO HS         Reported         7/13/08       Meloxicam (Mobic*) 7.5 Mg Tablet      0.5 TAB PO HS         Reported         7/13/08       Allopurinol (Allopurinol) 300 Mg Tablet      300 MG PO QDAY         Reported         7/13/08       Omega-3 Fatty Acids (Lovaza 1000 Mg) 1,000 Mg Cap      1 TAB PO BID         Reported         7/13/08      Current Medications      Current Medications Reviewed 7/9/18            Pain Assessment      Pain Intensity:  0      Description:  None            Review of Systems      General:  No Fatigue Scale:, No Pain Scale:      HEENT:  No Dysphagia, No Hearing Changes, No Visual Changes      Respiratory:  No Cough, No Shortness of Air, No Wheezing      Cardiovascular:  No Chest Pain, No Palpitations      Gastrointestinal:  No Nausea, No Vomiting, No Constipation, No Diarrhea,     Appetite Good      Genitourinary:  No Nocturia, No Dysuria      Musculoskeletal:  No Aches, No Pains      Endocrine:  No Heat Intolerance, No Fatigue      Hematologic:  No Bleeding, No Swollen Glands      Allergic/Immunologic:  No Hives, No Nasal drip      Psychological:   No Anxiety, No Depression      Neurological:  No Headaches, No Dizziness      Skin:  No Rash, No Edema            Vitals      Height 5 ft 9.5 in / 176.53 cm      Weight 262 lbs 0 oz / 118.813877 kg      BSA 2.46 m2      BMI 38.1 kg/m2      Temperature 98.1 F / 36.72 C      Pulse 60      Respirations 18      Blood Pressure 141/73      Pulse Oximetry 96%            Exam      Constitutional:  No acute distress, Conversant, Pleasant      Eyes:  Anicteric sclerae, Palpebral Conjunctivae (pink), JACOB      HENT:  Oropharynx clear, No Erythema, Buccal mucosae (pink)      Neck:  Supple      Cardiovascular:  RRR, No Murmurs, Normal PMI, No Peripheral Edema      Lungs:  Clear to Ausculation, Normal Respiratory Effort      Abdomen:  Soft, NABS, No Tenderness      Chest:  Other (symmetrical and equal)      Lymphatic:  No Neck      Extremities:  Normal gait, Other (no deformity no limitation range of motion)      Neurological:  Cranial Nerve II-XII, No Focal Sensory deficits      Psychological:  Appropriate affect, Intact judgement, Alert      Skin:  Normal temperature, Other (no dermatosis)            Lab Results      Laboratory Tests      6/21/18 11:35            Laboratory Tests            Test        6/21/18      11:35 6/28/18      21:57             White Blood Count        5.63 10*3/uL      (4.80-10.80)              Red Blood Count        5.04 10*6/uL      (4.70-6.10)              Hemoglobin        15.60 g/dL      (14.00-18.00)              Hematocrit        43.8 %      (42.0-52.0)              Mean Corpuscular Volume        86.9 fL      (80.0-96.0)              Mean Corpuscular Hemoglobin        30.9 pg      (27.0-31.0)              Mean Corpuscular Hemoglobin      Concent 35.6 %      (33.0-37.0)              Red Cell Distribution Width        12.0 %      (11.5-14.5)              Platelet Count        172.00 10*3/uL      (130..00)              Mean Platelet Volume        9.2 fL      (7.4-10.4)               Granulocytes (%)        64.1 %      (30.0-85.0)              Lymphocytes (%) (Auto)        24.40 %      (20.00-45.00)              Monocytes (%) (Auto)        8.92 %      (3.00-10.00)              Eosinophils (%) (Auto)        2.18 %      (0.00-7.00)              Basophils (%) (Auto)        0.36 %      (0.00-3.00)              Granulocytes #        3.61 10*3/uL      (2.00-8.00)              Lymphocytes # (Auto)        1.38 10*3/uL      (1.00-5.00)              Monocytes # (Auto)        0.50 10*3/uL      (0.20-1.20)              Eosinophils # (Auto)        0.12 10*3/uL      (0.00-0.70)              Basophils # (Auto)        0.02 10*3/uL      (0.00-0.20)              Nucleated Red Blood Cells %        0.00 %      (0.00-0.01)              Prostate Specific Antigen      Screen 0.64 ng/mL      (0.00-4.00)              Total Testosterone 339 ng/dL               Free Testosterone 34 pg/mL               Percent Free Testosterone 1.0 %               Bioavailable Testosterone 88 ng/dL               Percent Bioavailable      Testosterone 26.1 %                      Sex Hormone Binding Globulin 52.8 nmol/L               Lab Scanned Report                LAB FINAL      CUMULATIVES -            Impression/Problem List      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  Multiple CT scans  done in the past.      Notes      New Medications      * Potassium Citrate (Urocit-K) 10 MEQ TABLET.ER: 10 MEQ PO BID 30 Days #60       Replaced Potassium Citrate 5 MEQ TABLET.ER:       10 MEQ PO BID 30 Days #120      Changed Medications      * Meloxicam (Mobic*) 7.5 MG TABLET: 0.5 TAB PO HS            Plan      Patient may continue his follow-up with Dr. MARTA Wilson.      Return in 6 months, CBC if needed            Patient Education:        Complete Blood Count      High Blood Pressure (Hypertension)  (Alternative Therapy)            PREVENTION      Hx Influenza Vaccination:  Yes (2017)      Influenza Vaccine Declined:  No      2 or More Falls Past Year?:  No      Fall Past Year with Injury?:  No      Hx Pneumococcal Vaccination:  Yes (NOT SURE WHEN)      Chart initiated by      ABAD Lassiter RN                 Disclaimer: Converted document may not contain table formatting or lab diagrams. Please see [employer] [facility] for the authenticated document.

## 2021-08-02 RX ORDER — ALLOPURINOL 300 MG/1
300 TABLET ORAL DAILY
COMMUNITY

## 2021-08-02 RX ORDER — MONTELUKAST SODIUM 10 MG/1
10 TABLET ORAL DAILY
COMMUNITY

## 2021-08-02 RX ORDER — OMEGA-3-ACID ETHYL ESTERS 1 G/1
CAPSULE, LIQUID FILLED ORAL
COMMUNITY
End: 2021-11-02 | Stop reason: SDUPTHER

## 2021-08-02 RX ORDER — INSULIN GLARGINE 300 U/ML
65 INJECTION, SOLUTION SUBCUTANEOUS NIGHTLY
COMMUNITY

## 2021-08-02 RX ORDER — POTASSIUM CITRATE 10 MEQ/1
20 TABLET, EXTENDED RELEASE ORAL
COMMUNITY

## 2021-08-02 RX ORDER — FINASTERIDE 5 MG/1
TABLET, FILM COATED ORAL
COMMUNITY
End: 2021-11-02

## 2021-08-02 RX ORDER — MELOXICAM 15 MG/1
TABLET ORAL
COMMUNITY
End: 2021-11-02

## 2021-08-02 RX ORDER — FLUTICASONE PROPIONATE 50 MCG
1 SPRAY, SUSPENSION (ML) NASAL DAILY
COMMUNITY
End: 2022-08-09

## 2021-08-02 RX ORDER — FEXOFENADINE HCL 180 MG/1
180 TABLET ORAL NIGHTLY
COMMUNITY

## 2021-08-02 RX ORDER — ASPIRIN 81 MG/1
81 TABLET ORAL NIGHTLY
COMMUNITY

## 2021-08-02 RX ORDER — ATORVASTATIN CALCIUM 20 MG/1
20 TABLET, FILM COATED ORAL NIGHTLY
COMMUNITY

## 2021-08-02 RX ORDER — TAMSULOSIN HYDROCHLORIDE 0.4 MG/1
CAPSULE ORAL
COMMUNITY
End: 2021-08-11

## 2021-08-02 RX ORDER — LISINOPRIL 10 MG/1
TABLET ORAL
COMMUNITY
End: 2021-12-09

## 2021-08-02 RX ORDER — GABAPENTIN 300 MG/1
300 CAPSULE ORAL 2 TIMES DAILY
COMMUNITY

## 2021-08-02 RX ORDER — CARVEDILOL 3.12 MG/1
3.12 TABLET ORAL 2 TIMES DAILY WITH MEALS
COMMUNITY
End: 2023-03-16 | Stop reason: SDUPTHER

## 2021-08-02 RX ORDER — MOMETASONE FUROATE AND FORMOTEROL FUMARATE DIHYDRATE 200; 5 UG/1; UG/1
AEROSOL RESPIRATORY (INHALATION)
COMMUNITY
End: 2021-11-02

## 2021-08-02 RX ORDER — FUROSEMIDE 20 MG/1
TABLET ORAL
COMMUNITY
End: 2021-11-02

## 2021-08-02 RX ORDER — FENOFIBRATE 160 MG/1
160 TABLET ORAL NIGHTLY
COMMUNITY

## 2021-08-05 RX ORDER — CHLORAL HYDRATE 500 MG
2000 CAPSULE ORAL 2 TIMES DAILY
COMMUNITY

## 2021-08-09 ENCOUNTER — TRANSCRIBE ORDERS (OUTPATIENT)
Dept: UROLOGY | Facility: CLINIC | Age: 66
End: 2021-08-09

## 2021-08-09 ENCOUNTER — LAB (OUTPATIENT)
Dept: LAB | Facility: HOSPITAL | Age: 66
End: 2021-08-09

## 2021-08-09 DIAGNOSIS — Z12.5 SCREENING FOR PROSTATE CANCER: ICD-10-CM

## 2021-08-09 DIAGNOSIS — Z12.5 SCREENING FOR PROSTATE CANCER: Primary | ICD-10-CM

## 2021-08-09 LAB — PSA SERPL-MCNC: 0.54 NG/ML (ref 0–4)

## 2021-08-09 PROCEDURE — 36415 COLL VENOUS BLD VENIPUNCTURE: CPT

## 2021-08-09 PROCEDURE — G0103 PSA SCREENING: HCPCS

## 2021-08-11 ENCOUNTER — HOSPITAL ENCOUNTER (EMERGENCY)
Facility: HOSPITAL | Age: 66
Discharge: HOME | End: 2021-08-11
Attending: EMERGENCY MEDICINE | Admitting: EMERGENCY MEDICINE

## 2021-08-11 ENCOUNTER — APPOINTMENT (OUTPATIENT)
Dept: CT IMAGING | Facility: HOSPITAL | Age: 66
End: 2021-08-11

## 2021-08-11 VITALS
HEART RATE: 65 BPM | WEIGHT: 241.18 LBS | OXYGEN SATURATION: 95 % | SYSTOLIC BLOOD PRESSURE: 139 MMHG | DIASTOLIC BLOOD PRESSURE: 63 MMHG | HEIGHT: 69 IN | TEMPERATURE: 98.5 F | RESPIRATION RATE: 18 BRPM | BODY MASS INDEX: 35.72 KG/M2

## 2021-08-11 DIAGNOSIS — N23 RENAL COLIC ON RIGHT SIDE: ICD-10-CM

## 2021-08-11 DIAGNOSIS — N20.1 URETEROLITHIASIS: Primary | ICD-10-CM

## 2021-08-11 DIAGNOSIS — N20.0 NEPHROLITHIASIS: ICD-10-CM

## 2021-08-11 LAB
ALBUMIN SERPL-MCNC: 4 G/DL (ref 3.5–5.2)
ALBUMIN/GLOB SERPL: 1.6 G/DL
ALP SERPL-CCNC: 65 U/L (ref 39–117)
ALT SERPL W P-5'-P-CCNC: 25 U/L (ref 1–41)
ANION GAP SERPL CALCULATED.3IONS-SCNC: 7.9 MMOL/L (ref 5–15)
AST SERPL-CCNC: 18 U/L (ref 1–40)
BACTERIA UR QL AUTO: ABNORMAL /HPF
BASOPHILS # BLD AUTO: 0.04 10*3/MM3 (ref 0–0.2)
BASOPHILS NFR BLD AUTO: 0.4 % (ref 0–1.5)
BILIRUB SERPL-MCNC: 0.4 MG/DL (ref 0–1.2)
BILIRUB UR QL STRIP: NEGATIVE
BUN SERPL-MCNC: 20 MG/DL (ref 8–23)
BUN/CREAT SERPL: 12.7 (ref 7–25)
CALCIUM SPEC-SCNC: 9.2 MG/DL (ref 8.6–10.5)
CHLORIDE SERPL-SCNC: 105 MMOL/L (ref 98–107)
CLARITY UR: CLEAR
CO2 SERPL-SCNC: 26.1 MMOL/L (ref 22–29)
COLOR UR: YELLOW
CREAT SERPL-MCNC: 1.58 MG/DL (ref 0.76–1.27)
DEPRECATED RDW RBC AUTO: 38.7 FL (ref 37–54)
EOSINOPHIL # BLD AUTO: 0.21 10*3/MM3 (ref 0–0.4)
EOSINOPHIL NFR BLD AUTO: 2.3 % (ref 0.3–6.2)
ERYTHROCYTE [DISTWIDTH] IN BLOOD BY AUTOMATED COUNT: 12.2 % (ref 12.3–15.4)
GFR SERPL CREATININE-BSD FRML MDRD: 44 ML/MIN/1.73
GLOBULIN UR ELPH-MCNC: 2.5 GM/DL
GLUCOSE SERPL-MCNC: 171 MG/DL (ref 65–99)
GLUCOSE UR STRIP-MCNC: NEGATIVE MG/DL
HCT VFR BLD AUTO: 46.6 % (ref 37.5–51)
HGB BLD-MCNC: 15.5 G/DL (ref 13–17.7)
HGB UR QL STRIP.AUTO: ABNORMAL
HOLD SPECIMEN: NORMAL
HOLD SPECIMEN: NORMAL
HYALINE CASTS UR QL AUTO: ABNORMAL /LPF
IMM GRANULOCYTES # BLD AUTO: 0.05 10*3/MM3 (ref 0–0.05)
IMM GRANULOCYTES NFR BLD AUTO: 0.6 % (ref 0–0.5)
KETONES UR QL STRIP: NEGATIVE
LEUKOCYTE ESTERASE UR QL STRIP.AUTO: NEGATIVE
LYMPHOCYTES # BLD AUTO: 1.26 10*3/MM3 (ref 0.7–3.1)
LYMPHOCYTES NFR BLD AUTO: 13.9 % (ref 19.6–45.3)
MCH RBC QN AUTO: 29.1 PG (ref 26.6–33)
MCHC RBC AUTO-ENTMCNC: 33.3 G/DL (ref 31.5–35.7)
MCV RBC AUTO: 87.4 FL (ref 79–97)
MONOCYTES # BLD AUTO: 0.8 10*3/MM3 (ref 0.1–0.9)
MONOCYTES NFR BLD AUTO: 8.8 % (ref 5–12)
NEUTROPHILS NFR BLD AUTO: 6.73 10*3/MM3 (ref 1.7–7)
NEUTROPHILS NFR BLD AUTO: 74 % (ref 42.7–76)
NITRITE UR QL STRIP: NEGATIVE
NRBC BLD AUTO-RTO: 0 /100 WBC (ref 0–0.2)
PH UR STRIP.AUTO: 5.5 [PH] (ref 5–8)
PLATELET # BLD AUTO: 224 10*3/MM3 (ref 140–450)
PMV BLD AUTO: 10.8 FL (ref 6–12)
POTASSIUM SERPL-SCNC: 4.4 MMOL/L (ref 3.5–5.2)
PROT SERPL-MCNC: 6.5 G/DL (ref 6–8.5)
PROT UR QL STRIP: NEGATIVE
RBC # BLD AUTO: 5.33 10*6/MM3 (ref 4.14–5.8)
RBC # UR: ABNORMAL /HPF
REF LAB TEST METHOD: ABNORMAL
SODIUM SERPL-SCNC: 139 MMOL/L (ref 136–145)
SP GR UR STRIP: 1.02 (ref 1–1.03)
SQUAMOUS #/AREA URNS HPF: ABNORMAL /HPF
UROBILINOGEN UR QL STRIP: ABNORMAL
WBC # BLD AUTO: 9.09 10*3/MM3 (ref 3.4–10.8)
WBC UR QL AUTO: ABNORMAL /HPF
WHOLE BLOOD HOLD SPECIMEN: NORMAL

## 2021-08-11 PROCEDURE — 99283 EMERGENCY DEPT VISIT LOW MDM: CPT

## 2021-08-11 PROCEDURE — 74176 CT ABD & PELVIS W/O CONTRAST: CPT

## 2021-08-11 PROCEDURE — 25010000002 ONDANSETRON PER 1 MG: Performed by: EMERGENCY MEDICINE

## 2021-08-11 PROCEDURE — 80053 COMPREHEN METABOLIC PANEL: CPT

## 2021-08-11 PROCEDURE — 96375 TX/PRO/DX INJ NEW DRUG ADDON: CPT

## 2021-08-11 PROCEDURE — 81001 URINALYSIS AUTO W/SCOPE: CPT

## 2021-08-11 PROCEDURE — 25010000002 HYDROMORPHONE 1 MG/ML SOLUTION: Performed by: EMERGENCY MEDICINE

## 2021-08-11 PROCEDURE — 25010000002 KETOROLAC TROMETHAMINE PER 15 MG: Performed by: EMERGENCY MEDICINE

## 2021-08-11 PROCEDURE — 85025 COMPLETE CBC W/AUTO DIFF WBC: CPT

## 2021-08-11 PROCEDURE — 36415 COLL VENOUS BLD VENIPUNCTURE: CPT

## 2021-08-11 PROCEDURE — 96374 THER/PROPH/DIAG INJ IV PUSH: CPT

## 2021-08-11 RX ORDER — OXYCODONE AND ACETAMINOPHEN 10; 325 MG/1; MG/1
1 TABLET ORAL EVERY 6 HOURS PRN
Qty: 12 TABLET | Refills: 0 | Status: SHIPPED | OUTPATIENT
Start: 2021-08-11 | End: 2021-11-02

## 2021-08-11 RX ORDER — TRAMADOL HYDROCHLORIDE 50 MG/1
50 TABLET ORAL 2 TIMES DAILY PRN
COMMUNITY

## 2021-08-11 RX ORDER — KETOROLAC TROMETHAMINE 15 MG/ML
15 INJECTION, SOLUTION INTRAMUSCULAR; INTRAVENOUS ONCE
Status: COMPLETED | OUTPATIENT
Start: 2021-08-11 | End: 2021-08-11

## 2021-08-11 RX ORDER — ONDANSETRON 4 MG/1
4 TABLET, ORALLY DISINTEGRATING ORAL 4 TIMES DAILY
Qty: 20 TABLET | Refills: 0 | Status: SHIPPED | OUTPATIENT
Start: 2021-08-11 | End: 2021-11-02

## 2021-08-11 RX ORDER — SEMAGLUTIDE 1.34 MG/ML
0.5 INJECTION, SOLUTION SUBCUTANEOUS WEEKLY
COMMUNITY
End: 2023-03-16 | Stop reason: ALTCHOICE

## 2021-08-11 RX ORDER — ONDANSETRON 2 MG/ML
4 INJECTION INTRAMUSCULAR; INTRAVENOUS ONCE
Status: COMPLETED | OUTPATIENT
Start: 2021-08-11 | End: 2021-08-11

## 2021-08-11 RX ORDER — TAMSULOSIN HYDROCHLORIDE 0.4 MG/1
1 CAPSULE ORAL DAILY
Qty: 14 CAPSULE | Refills: 0 | Status: SHIPPED | OUTPATIENT
Start: 2021-08-11 | End: 2021-08-25

## 2021-08-11 RX ORDER — OXYCODONE HYDROCHLORIDE AND ACETAMINOPHEN 5; 325 MG/1; MG/1
1 TABLET ORAL ONCE
Status: COMPLETED | OUTPATIENT
Start: 2021-08-11 | End: 2021-08-11

## 2021-08-11 RX ORDER — SODIUM CHLORIDE 0.9 % (FLUSH) 0.9 %
10 SYRINGE (ML) INJECTION AS NEEDED
Status: DISCONTINUED | OUTPATIENT
Start: 2021-08-11 | End: 2021-08-11 | Stop reason: HOSPADM

## 2021-08-11 RX ADMIN — KETOROLAC TROMETHAMINE 15 MG: 15 INJECTION, SOLUTION INTRAMUSCULAR; INTRAVENOUS at 13:40

## 2021-08-11 RX ADMIN — HYDROMORPHONE HYDROCHLORIDE 0.5 MG: 1 INJECTION, SOLUTION INTRAMUSCULAR; INTRAVENOUS; SUBCUTANEOUS at 13:40

## 2021-08-11 RX ADMIN — ONDANSETRON 4 MG: 2 INJECTION INTRAMUSCULAR; INTRAVENOUS at 13:40

## 2021-08-11 RX ADMIN — SODIUM CHLORIDE 1000 ML: 9 INJECTION, SOLUTION INTRAVENOUS at 13:40

## 2021-08-11 RX ADMIN — OXYCODONE HYDROCHLORIDE AND ACETAMINOPHEN 1 TABLET: 5; 325 TABLET ORAL at 15:04

## 2021-08-11 NOTE — ED PROVIDER NOTES
Subjective   Flank pain since 10am; has history of kidney stones      History provided by:  Patient   used: No    Flank Pain  Pain location:  R flank  Pain quality: aching, sharp and stabbing    Pain radiates to:  RLQ  Pain severity:  Moderate  Onset quality:  Sudden  Timing:  Constant  Progression:  Waxing and waning  Chronicity:  New  Associated symptoms: nausea        Review of Systems   Constitutional: Negative.    HENT: Negative.    Eyes: Negative.    Respiratory: Negative.    Cardiovascular: Negative.    Gastrointestinal: Positive for nausea.   Endocrine: Negative.    Genitourinary: Positive for flank pain.   Skin: Negative.    Allergic/Immunologic: Negative.    Neurological: Negative.    Hematological: Negative.    Psychiatric/Behavioral: Negative.        Past Medical History:   Diagnosis Date   • Arthritis    • Asthma    • Bacteriuria 02/17/2021   • Benign essential hypertension    • Bilateral carpal tunnel syndrome 01/29/2019   • BPH (benign prostatic hyperplasia)    • Diabetes (CMS/HCC)    • Diabetes mellitus type 2, noninsulin dependent (CMS/HCC)    • ED (erectile dysfunction)    • HLD (hyperlipidemia)    • HTN (hypertension)    • Hyperlipemia    • Hypogonadism in male    • Leg swelling    • Neuropathy    • Preop examination 01/29/2019    BILATERAL CTS WANTS TO HAVE RIGHT CTR BEFORE LEFT   • Prostate disorder    • Renal calculus or stone    • Seasonal allergies        No Known Allergies    Past Surgical History:   Procedure Laterality Date   • CARPAL TUNNEL RELEASE Right 01/18/2019    CTR   • COLONOSCOPY     • CYSTOSCOPY     • EYE SURGERY      EYE IMPLANT   • KIDNEY STONE SURGERY     • PROSTATE HOLMIUM LASER ABLATION     • TURP / TRANSURETHRAL INCISION / DRAINAGE PROSTATE  04/26/2017       Family History   Problem Relation Age of Onset   • Breast cancer Mother    • Osteoporosis Mother    • Arthritis Mother    • Lung cancer Father    • Kidney cancer Father        Social History      Socioeconomic History   • Marital status:      Spouse name: Not on file   • Number of children: Not on file   • Years of education: Not on file   • Highest education level: Not on file   Tobacco Use   • Smoking status: Never Smoker   • Smokeless tobacco: Never Used   Vaping Use   • Vaping Use: Never used   Substance and Sexual Activity   • Alcohol use: Never   • Drug use: Never   • Sexual activity: Defer           Objective   Physical Exam  Constitutional:       Appearance: Normal appearance.   HENT:      Head: Normocephalic and atraumatic.      Nose: Nose normal.      Mouth/Throat:      Mouth: Mucous membranes are moist.   Eyes:      Pupils: Pupils are equal, round, and reactive to light.   Cardiovascular:      Rate and Rhythm: Normal rate and regular rhythm.      Pulses: Normal pulses.   Pulmonary:      Effort: Pulmonary effort is normal.      Breath sounds: Normal breath sounds.   Abdominal:      General: Abdomen is flat. Bowel sounds are normal.      Palpations: Abdomen is soft.      Tenderness: There is abdominal tenderness. There is right CVA tenderness.   Musculoskeletal:         General: Normal range of motion.      Cervical back: Normal range of motion.   Skin:     General: Skin is warm and dry.      Capillary Refill: Capillary refill takes less than 2 seconds.   Neurological:      General: No focal deficit present.      Mental Status: He is alert and oriented to person, place, and time. Mental status is at baseline.   Psychiatric:         Mood and Affect: Mood normal.         Procedures           ED Course                                           MDM  Number of Diagnoses or Management Options  Nephrolithiasis: new and requires workup  Renal colic on right side: new and requires workup  Ureterolithiasis: new and requires workup  Diagnosis management comments: The patient presents with flank pain. Patient was found to have ureterolithiasis on CT. The patient´s labs and urinalysis were reviewed.  Patient is now resting comfortably, feels better, is alert, and is in no distress. The repeat examination is unremarkable and benign. The patient has no signs of urosepsis. The patient is referred to his urologist for follow up and is discharged with oral medication for pain control, zofran and Flomax. The patient was counseled to return to the ER for fever >100.5, intractable pain or vomiting, or any other concerns that the may have. The patient has expressed a clear and thorough understanding and agreed to follow up as instructed.        Amount and/or Complexity of Data Reviewed  Clinical lab tests: reviewed and ordered  Tests in the radiology section of CPT®: reviewed and ordered    Risk of Complications, Morbidity, and/or Mortality  Presenting problems: low  Diagnostic procedures: low  Management options: low    Patient Progress  Patient progress: stable      Final diagnoses:   Ureterolithiasis   Nephrolithiasis   Renal colic on right side       ED Disposition  ED Disposition     ED Disposition Condition Comment    Discharge Stable           Serafin Pereira MD  [address]  [place] [address]  [phone]    Schedule an appointment as soon as possible for a visit in 5 days  if no better         Medication List      New Prescriptions    ondansetron ODT 4 MG disintegrating tablet  Commonly known as: ZOFRAN-ODT  Place 1 tablet on the tongue 4 (Four) Times a Day.     oxyCODONE-acetaminophen  MG per tablet  Commonly known as: PERCOCET  Take 1 tablet by mouth Every 6 (Six) Hours As Needed for Moderate Pain .        Changed    tamsulosin 0.4 MG capsule 24 hr capsule  Commonly known as: FLOMAX  Take 1 capsule by mouth Daily for 14 days.  What changed: See the new instructions.           Where to Get Your Medications      These medications were sent to [facility] [place], [place] - [address], [address] - [phone]  - [phone]  [address], [address],  [place] [address]    Phone: [phone]   · ondansetron ODT 4 MG disintegrating tablet  · oxyCODONE-acetaminophen  MG per tablet  · tamsulosin 0.4 MG capsule 24 hr capsule          Thuy Rodrigues, APRN  08/11/21 1443

## 2021-08-17 ENCOUNTER — OFFICE VISIT (OUTPATIENT)
Dept: UROLOGY | Facility: CLINIC | Age: 66
End: 2021-08-17

## 2021-08-17 VITALS
BODY MASS INDEX: 35.7 KG/M2 | HEART RATE: 72 BPM | TEMPERATURE: 96.7 F | DIASTOLIC BLOOD PRESSURE: 72 MMHG | HEIGHT: 69 IN | SYSTOLIC BLOOD PRESSURE: 139 MMHG | WEIGHT: 241 LBS

## 2021-08-17 DIAGNOSIS — N20.0 RENAL STONES: ICD-10-CM

## 2021-08-17 DIAGNOSIS — N20.1 RIGHT URETERAL STONE: Primary | ICD-10-CM

## 2021-08-17 LAB
BACTERIA UR QL AUTO: NORMAL /HPF
BILIRUB BLD-MCNC: NEGATIVE MG/DL
CLARITY, POC: CLEAR
COLOR UR: YELLOW
EPI CELLS #/AREA URNS HPF: 0 /[HPF]
GLUCOSE UR STRIP-MCNC: NEGATIVE MG/DL
KETONES UR QL: NEGATIVE
LEUKOCYTE EST, POC: NEGATIVE
NITRITE UR-MCNC: NEGATIVE MG/ML
PH UR: 5.5 [PH] (ref 5–8)
PROT UR STRIP-MCNC: NEGATIVE MG/DL
RBC # UR STRIP: ABNORMAL /UL
RBC # UR STRIP: NORMAL /HPF
RENAL EPITHELIAL, POC: 0
SP GR UR: 1.03 (ref 1–1.03)
UNIDENT CRYS URNS QL MICRO: NORMAL /HPF
UROBILINOGEN UR QL: NORMAL
WBC # UR STRIP: NORMAL /HPF

## 2021-08-17 PROCEDURE — 99212 OFFICE O/P EST SF 10 MIN: CPT | Performed by: UROLOGY

## 2021-08-17 PROCEDURE — 81003 URINALYSIS AUTO W/O SCOPE: CPT | Performed by: UROLOGY

## 2021-08-17 RX ORDER — GUAIFENESIN 600 MG/1
1200 TABLET, EXTENDED RELEASE ORAL 2 TIMES DAILY
COMMUNITY
End: 2021-11-02

## 2021-08-17 RX ORDER — PEN NEEDLE, DIABETIC 31 GX5/16"
NEEDLE, DISPOSABLE MISCELLANEOUS
Status: ON HOLD | COMMUNITY
Start: 2021-05-11 | End: 2021-12-13

## 2021-08-17 RX ORDER — TIMOLOL MALEATE 5 MG/ML
SOLUTION/ DROPS OPHTHALMIC
COMMUNITY
Start: 2021-07-28 | End: 2021-12-09

## 2021-08-17 RX ORDER — MELOXICAM 7.5 MG/1
TABLET ORAL
COMMUNITY
Start: 2021-05-13 | End: 2021-12-09

## 2021-08-17 RX ORDER — LATANOPROST 50 UG/ML
SOLUTION/ DROPS OPHTHALMIC
COMMUNITY
Start: 2021-07-28 | End: 2021-12-09

## 2021-08-17 NOTE — PROGRESS NOTES
"Chief Complaint  Follow-up (8/9/21 psa 0.540)    Right renal colic 5 days ago    Subjective  Patient is comfortable at this time        Jean-Pierre Anaya presents to [facility]  History of Present Illness    Right renal colic 5 days ago.  Had to 3 mm stones in the right ureter and passed about 3 days ago.  Still having some pain in the abdomen and right flank.  No nausea vomiting related to colicky pain.  No fever or chills        Objective Is in no distress  Vital Signs:   /72 (BP Location: Left arm, Patient Position: Sitting, Cuff Size: Adult)   Pulse 72   Temp 96.7 °F (35.9 °C)   Ht 175.3 cm (69\")   Wt 109 kg (241 lb)   BMI 35.59 kg/m²     No Known Allergies   Review of Systems   Constitutional: Negative.    HENT: Negative.    Eyes: Negative.    Respiratory: Negative.    Cardiovascular: Negative.    Gastrointestinal: Positive for abdominal pain.   Endocrine: Negative.    Genitourinary: Negative.    Musculoskeletal: Negative.    Skin: Negative.    Allergic/Immunologic: Negative.    Neurological: Negative.    Hematological: Negative.    Psychiatric/Behavioral: Negative.    All other systems reviewed and are negative.       Physical Exam  Constitutional:       Appearance: He is obese.   HENT:      Head: Normocephalic and atraumatic.      Nose: Nose normal.   Cardiovascular:      Rate and Rhythm: Normal rate and regular rhythm.      Pulses: Normal pulses.      Heart sounds: Normal heart sounds. No murmur heard.     Pulmonary:      Effort: Pulmonary effort is normal. No respiratory distress.      Breath sounds: No rhonchi or rales.   Abdominal:      Palpations: Abdomen is soft. There is no mass.      Tenderness: There is abdominal tenderness. There is no right CVA tenderness or left CVA tenderness.      Comments: Tenderness right lower quadrant.  No flank tenderness   Genitourinary:     Penis: Normal.       Testes: Normal.   Musculoskeletal:         General: No swelling or " tenderness.      Cervical back: Normal range of motion and neck supple. No rigidity or tenderness.   Lymphadenopathy:      Cervical: No cervical adenopathy.   Skin:     General: Skin is warm.      Coloration: Skin is not jaundiced.   Neurological:      General: No focal deficit present.      Mental Status: He is alert and oriented to person, place, and time.   Psychiatric:         Mood and Affect: Mood normal.         Behavior: Behavior normal.         Thought Content: Thought content normal.         Judgment: Judgment normal.        Result Review :                 Assessment and Plan    Diagnoses and all orders for this visit:    1. Right ureteral stone (Primary)  -     XR Abdomen 1 View    2. Renal stones  -     POC Urinalysis Dipstick, Automated  -     XR Abdomen 1 View        Follow Up     On KUB patient has 3 mm stone in the right mid ureter at the level of S3.  Has 2 stones in the left kidney which are again small.    I have given him a urinary strainer and we will see him in 10 days time    Patient was given instructions and counseling regarding his condition or for health maintenance advice. Please see specific information pulled into the AVS if appropriate.     Serafin Pereira MD

## 2021-08-27 ENCOUNTER — OFFICE VISIT (OUTPATIENT)
Dept: UROLOGY | Facility: CLINIC | Age: 66
End: 2021-08-27

## 2021-08-27 VITALS
TEMPERATURE: 97.3 F | BODY MASS INDEX: 35.7 KG/M2 | HEIGHT: 69 IN | HEART RATE: 65 BPM | SYSTOLIC BLOOD PRESSURE: 135 MMHG | DIASTOLIC BLOOD PRESSURE: 67 MMHG | WEIGHT: 241 LBS

## 2021-08-27 DIAGNOSIS — N20.1 RIGHT URETERAL STONE: ICD-10-CM

## 2021-08-27 DIAGNOSIS — R10.9 BILATERAL FLANK PAIN: Primary | ICD-10-CM

## 2021-08-27 LAB
BILIRUB BLD-MCNC: NEGATIVE MG/DL
CLARITY, POC: CLEAR
COLOR UR: YELLOW
GLUCOSE UR STRIP-MCNC: ABNORMAL MG/DL
KETONES UR QL: NEGATIVE
LEUKOCYTE EST, POC: NEGATIVE
NITRITE UR-MCNC: NEGATIVE MG/ML
PH UR: 7 [PH] (ref 5–8)
PROT UR STRIP-MCNC: NEGATIVE MG/DL
RBC # UR STRIP: NEGATIVE /UL
SP GR UR: 1.02 (ref 1–1.03)
UROBILINOGEN UR QL: ABNORMAL

## 2021-08-27 PROCEDURE — 81003 URINALYSIS AUTO W/O SCOPE: CPT | Performed by: UROLOGY

## 2021-08-27 PROCEDURE — 99212 OFFICE O/P EST SF 10 MIN: CPT | Performed by: UROLOGY

## 2021-08-27 NOTE — PROGRESS NOTES
"Chief Complaint  Follow-up (kidney stones)    Patient had right ureteral calculus but now his pain in both flanks    Subjective  Is uncomfortable        Jean-Pierre Anaya presents to [facility]  History of Present Illness    Pain in both flanks.  Has been passing some bloody specks in the urine.  Has been having some nausea and vomiting.  No fever or chills    Objective Seems to be in some distress  Vital Signs:   /67 (BP Location: Right arm, Patient Position: Sitting, Cuff Size: Adult)   Pulse 65   Temp 97.3 °F (36.3 °C)   Ht 175.3 cm (69.02\")   Wt 109 kg (241 lb)   BMI 35.57 kg/m²     No Known Allergies   Review of Systems    No change from last visit except nausea vomiting and flank pain on both sides    Physical Exam  Constitutional:       Appearance: He is obese.      Comments: Seem to be in some discomfort   HENT:      Head: Normocephalic and atraumatic.      Nose: Nose normal.   Eyes:      Pupils: Pupils are equal, round, and reactive to light.   Abdominal:      Palpations: There is no mass.      Tenderness: There is abdominal tenderness. There is right CVA tenderness and left CVA tenderness.   Skin:     General: Skin is warm.      Coloration: Skin is not jaundiced.   Neurological:      General: No focal deficit present.      Mental Status: He is alert and oriented to person, place, and time.   Psychiatric:         Mood and Affect: Mood normal.         Behavior: Behavior normal.         Thought Content: Thought content normal.         Judgment: Judgment normal.        Result Review :                 Assessment and Plan    Diagnoses and all orders for this visit:    1. Right ureteral stone (Primary)  -     POC Urinalysis Dipstick, Automated  -     XR Abdomen 1 View      KUB shows the stone is gone down to celebrate further in the right mid ureter      Follow Up     We will see him next week and if he continues to have problem we will do ureteroscopy stone " extraction      Patient was given instructions and counseling regarding his condition or for health maintenance advice. Please see specific information pulled into the AVS if appropriate.     Serafin Pereira MD

## 2021-09-02 ENCOUNTER — OFFICE VISIT (OUTPATIENT)
Dept: UROLOGY | Facility: CLINIC | Age: 66
End: 2021-09-02

## 2021-09-02 VITALS
BODY MASS INDEX: 36.29 KG/M2 | HEART RATE: 70 BPM | HEIGHT: 69 IN | DIASTOLIC BLOOD PRESSURE: 70 MMHG | SYSTOLIC BLOOD PRESSURE: 145 MMHG | WEIGHT: 245 LBS | TEMPERATURE: 96.9 F

## 2021-09-02 DIAGNOSIS — N20.1 RIGHT URETERAL STONE: Primary | ICD-10-CM

## 2021-09-02 PROCEDURE — 99212 OFFICE O/P EST SF 10 MIN: CPT | Performed by: UROLOGY

## 2021-09-02 PROCEDURE — 81003 URINALYSIS AUTO W/O SCOPE: CPT | Performed by: UROLOGY

## 2021-09-02 NOTE — PROGRESS NOTES
"Chief Complaint  Follow-up (kidney stone)   Patient had 2 mm stone in the right ureter  Subjective  No pain since Monday        Jean-Pierre Anaya presents to [facility]  History of Present Illness    Patient had right renal colic and is a 2 mm stone in the right upper ureter and we have been following him last week the stone was around S2 vertebra.  Patient is pain-free now    Objective In no pain  Vital Signs:   /70 (BP Location: Right arm, Patient Position: Sitting, Cuff Size: Adult)   Pulse 70   Temp 96.9 °F (36.1 °C)   Ht 175.3 cm (69\")   Wt 111 kg (245 lb)   BMI 36.18 kg/m²     No Known Allergies   Review of Systems    No change since last time    Physical Exam  Constitutional:       Appearance: He is obese.   HENT:      Head: Normocephalic and atraumatic.   Abdominal:      General: There is no distension.      Palpations: Abdomen is soft. There is no mass.      Tenderness: There is no abdominal tenderness. There is no right CVA tenderness or left CVA tenderness.   Neurological:      General: No focal deficit present.      Mental Status: He is alert and oriented to person, place, and time.   Psychiatric:         Mood and Affect: Mood normal.         Behavior: Behavior normal.         Thought Content: Thought content normal.         Judgment: Judgment normal.        Result Review :                 Assessment and Plan    Diagnoses and all orders for this visit:    1. Right ureteral stone (Primary)  -     POC Urinalysis Dipstick, Automated  -     XR Abdomen 3 View    More than likely patient has passed the stone.  Patient had some stones in the left kidney and he was questioning about dose but they are so small that he does not need any surgical treatment for them.  He also has erectile dysfunction and is interested in penile implant which we have talked to him before.  Will do it for him at his convenience    Follow Up   We will see him whenever the patient wants us to " see    Patient was given instructions and counseling regarding his condition or for health maintenance advice. Please see specific information pulled into the AVS if appropriate.     Serafin Pereira MD

## 2021-10-15 ENCOUNTER — OFFICE VISIT (OUTPATIENT)
Dept: ORTHOPEDIC SURGERY | Facility: CLINIC | Age: 66
End: 2021-10-15

## 2021-10-15 VITALS — WEIGHT: 245 LBS | BODY MASS INDEX: 36.29 KG/M2 | HEIGHT: 69 IN

## 2021-10-15 DIAGNOSIS — M25.561 RIGHT KNEE PAIN, UNSPECIFIED CHRONICITY: ICD-10-CM

## 2021-10-15 DIAGNOSIS — M18.12 ARTHRITIS OF CARPOMETACARPAL (CMC) JOINT OF LEFT THUMB: Primary | ICD-10-CM

## 2021-10-15 DIAGNOSIS — M22.2X1 PATELLOFEMORAL SYNDROME, RIGHT: ICD-10-CM

## 2021-10-15 DIAGNOSIS — M79.645 PAIN OF LEFT THUMB: ICD-10-CM

## 2021-10-15 PROCEDURE — 99213 OFFICE O/P EST LOW 20 MIN: CPT | Performed by: ORTHOPAEDIC SURGERY

## 2021-10-15 NOTE — PROGRESS NOTES
"Chief Complaint  Initial Evaluation of the Right Knee and Initial Evaluation of the Left Hand     Subjective      Jean-Pierre Anaya presents to [facility] for an evaluation of right knee pain and left hand pain. Patient has pain in his thumb and at the base of his thumb. He states trouble with opening up jars and holding a . He denies any locking and catching of the thumb. He states it isn't stiff but it is painful to move.     He states his right knee is a constant pain. He states that he has pain with flexion of the knee in a seated position. He states he has trouble sleeping at night since it is waking him up at night. He was taking Meloxicam but has been off of it for 3 months. His PCP saw his kidney function being poor. It did help with kidney function being off of it. His PCP advised him to avoid NSAIDs if possible.     No Known Allergies     Social History     Socioeconomic History   • Marital status:    Tobacco Use   • Smoking status: Never Smoker   • Smokeless tobacco: Never Used   Vaping Use   • Vaping Use: Never used   Substance and Sexual Activity   • Alcohol use: Never   • Drug use: Never   • Sexual activity: Defer        Review of Systems     Objective   Vital Signs:   Ht 175.3 cm (69\")   Wt 111 kg (245 lb)   BMI 36.18 kg/m²       Physical Exam  Constitutional:       Appearance: Normal appearance. Patient is well-developed and normal weight.   HENT:      Head: Normocephalic.      Right Ear: Hearing and external ear normal.      Left Ear: Hearing and external ear normal.      Nose: Nose normal.   Eyes:      Conjunctiva/sclera: Conjunctivae normal.   Cardiovascular:      Rate and Rhythm: Normal rate.   Pulmonary:      Effort: Pulmonary effort is normal.      Breath sounds: No wheezing or rales.   Abdominal:      Palpations: Abdomen is soft.      Tenderness: There is no abdominal tenderness.   Musculoskeletal:      Cervical back: Normal range of motion. "   Skin:     Findings: No rash.   Neurological:      Mental Status: Patient is alert and oriented to person, place, and time.   Psychiatric:         Mood and Affect: Mood and affect normal.         Judgment: Judgment normal.       Ortho Exam      RIGHT KNEE: Full weight bearing. Non-antalgic gait. Difficulty getting up from a seated position. Full extension and flexion. Good strength to hamstrings, quadriceps, dorsiflexors and plantar flexors. Sensation grossly intact. Neurovascular intact.  Calf supple, non-tender, no signs of DVT. Skin intact. Dorsal Pedal Pulse 2+, posterior tibialis pulse 2+. Patellofemoral crepitus.     LEFT HAND: Neurovascular intact. Sensation grossly intact. No swelling, atrophy, and skin discoloration. Skin intact. Full ROM. Patient able to wiggle fingers and make a fist. Full wrist extension, full wrist flexion, full  with some pain in the thumb, full thumb opposition with pain,  full PIP flexors, full DIP flexors, full PIP extensors, full finger adduction, full finger abduction. Radial pulse 2+, ulnar pulse 2+.  Tenderness about the thumb. Positive CMC grind test.       Procedures      Imaging Results (Most Recent)     Procedure Component Value Units Date/Time    XR Knee 3 View Right [[id number]] Resulted: 10/15/21 0954     Updated: 10/15/21 0957    XR Hand 2 View Left [[id number]] Resulted: 10/15/21 0954     Updated: 10/15/21 0956           Result Review :       X-Ray Report:  Left Hand  X-Ray  Indication: Evaluation of left hand pain   AP and Lateral view(s)  Findings: Arthritis present at the base of the thumb.   Prior studies available for comparison: no        X-Ray Report:  Right knee(s) X-Ray  Indication: Evaluation of right knee pain   AP, Lateral and Standing view(s)  Findings: Degenerative changes present in the patellofemoral compartment. Slight lateral tracking patella.   Prior studies available for comparison: no     Assessment and Plan     DX: CMC arthritis of the thumb,  left   Patellofemoral syndrome, right     A prescription for physical therapy was provided for the patient. Voltaren Gel prescribed for the patient to rub on his hand and knees.     Call or return if worsening symptoms.    Follow Up     6 weeks.       Patient was given instructions and counseling regarding his condition or for health maintenance advice. Please see specific information pulled into the AVS if appropriate.     Scribed for Ophelia Shepard MD by Yari Castellanos.  10/15/21   09:32 EDT        I have personally performed the services described in this document as scribed by the above individual and it is both accurate and complete. Ophelia Shepard MD 10/16/21

## 2021-10-18 DIAGNOSIS — N20.0 RENAL STONES: ICD-10-CM

## 2021-10-18 DIAGNOSIS — N20.0 CALCULUS OF KIDNEY: ICD-10-CM

## 2021-10-18 DIAGNOSIS — N20.1 RIGHT URETERAL STONE: Primary | ICD-10-CM

## 2021-10-18 PROCEDURE — 82365 CALCULUS SPECTROSCOPY: CPT | Performed by: UROLOGY

## 2021-10-18 PROCEDURE — 88300 SURGICAL PATH GROSS: CPT | Performed by: UROLOGY

## 2021-10-25 LAB
CALCIUM OXALATE DIHYDRATE MFR STONE IR: 30 %
COLOR STONE: NORMAL
COM MFR STONE: 70 %
COMPN STONE: NORMAL
LABORATORY COMMENT REPORT: NORMAL
Lab: NORMAL
Lab: NORMAL
PHOTO: NORMAL
SIZE STONE: NORMAL MM
SPEC SOURCE SUBJ: NORMAL
WT STONE: 38 MG

## 2021-10-29 ENCOUNTER — TREATMENT (OUTPATIENT)
Dept: PHYSICAL THERAPY | Facility: CLINIC | Age: 66
End: 2021-10-29

## 2021-10-29 DIAGNOSIS — M79.645 PAIN OF LEFT THUMB: Primary | ICD-10-CM

## 2021-10-29 DIAGNOSIS — M18.12 OSTEOARTHRITIS OF CARPOMETACARPAL (CMC) JOINT OF LEFT THUMB, UNSPECIFIED OSTEOARTHRITIS TYPE: Primary | ICD-10-CM

## 2021-10-29 DIAGNOSIS — M79.645 PAIN OF LEFT THUMB: ICD-10-CM

## 2021-10-29 PROCEDURE — 97110 THERAPEUTIC EXERCISES: CPT | Performed by: PHYSICAL THERAPIST

## 2021-10-29 PROCEDURE — 97166 OT EVAL MOD COMPLEX 45 MIN: CPT | Performed by: PHYSICAL THERAPIST

## 2021-10-29 NOTE — PROGRESS NOTES
Outpatient Occupational Therapy Ortho Initial Evaluation    Patient: Jean-Pierre Anaya   : 1955  Diagnosis/ICD-10 Code:  Osteoarthritis of carpometacarpal (CMC) joint of right thumb, unspecified osteoarthritis type [M18.11]  Referring practitioner: Ophelia Shepard MD  Date of Initial Visit: 10/29/2021  Today's Date: 10/29/2021  Patient seen for 1 sessions               Subjective Questionnaire: QuickDASH:     Past Medical History: hypertension, diabetes, R CTR     Subjective Evaluation    History of Present Illness  Mechanism of injury: Pt is R handed retired male who reports B thumb pain with the L worse than the R. Pt given topical cream and referred for Occupational therapy for evaluation and treatment.      Patient Occupation: retired  Quality of life: good    Pain  Current pain ratin  At best pain ratin  At worst pain rating: 10  Quality: sharp  Relieving factors: medications    Social Support  Lives with: spouse    Patient Goals  Patient goals for therapy: decreased pain             Objective          Observations     Additional Wrist/Hand Observation Details  No abnormalities noted    Neurological Testing     Sensation     Wrist/Hand   Left   Intact: light touch    Active Range of Motion     Left Wrist   Wrist flexion: WFL  Wrist extension: WFL  Radial deviation: WFL  Ulnar deviation: WFL      Left Thumb   Flexion     MP: 40 degrees    DIP: 60 degrees  Opposition: Tip small finger    Strength/Myotome Testing     Left Wrist/Hand      (2nd hand position)     Trial 1: 92 lbs    Trial 2: 98 lbs    Trial 3: 85 lbs    Average: 91.67 lbs    Thumb Strength  Key/Lateral Pinch     Trial 1: 18 lbs    Trial 2: 18 lbs    Trial 3: 19 lbs    Average: 18.33 lbs    Right Wrist/Hand      (2nd hand position)     Trial 1: 114 lbs    Trial 2: 114 lbs    Trial 3: 110 lbs    Average: 112.67 lbs    Thumb Strength   Key/Lateral Pinch     Trial 1: 22 lbs    Trial 2: 22 lbs    Trial 3: 22 lbs    Average:  22 lbs          Assessment & Plan     Assessment  Impairments: activity intolerance, impaired physical strength and pain with function  Prognosis: good  Functional Limitations: carrying objects, lifting, sleeping, uncomfortable because of pain and unable to perform repetitive tasks  Goals  Plan Goals: 1. The patient complains of pain in the L thumb.                  LTG 1: 12 weeks:  The patient will report a pain rating of 0/10 or better in order to improve sleep quality and tolerance to performance of activities of daily living.                                  STATUS:  New                  STG 1a: 6weeks:  The patient will report a pain rating of 5/10 or better at worst.                                   STATUS:  New                   STG 1b: 2 weeks: The patient will have good orthotic fit to decrease pain and support L thumb.                                  STATUS: New  2. Carrying, Moving, and Handling Objects Functional Limitation                                   LTG 2: 12 weeks:  The patient will achieve a score of 15/55 on the Quick DASH.                                  STATUS:  New                                                  STATUS:  New                        Plan  Therapy options: will be seen for skilled physical therapy services  Planned modality interventions: TENS, thermotherapy (hydrocollator packs) and thermotherapy (paraffin bath)  Other planned modality interventions: fluidotherapy  Planned therapy interventions: functional ROM exercises, home exercise program, strengthening, neuromuscular re-education, manual therapy, fine motor coordination training, soft tissue mobilization, therapeutic activities and orthotic fitting/training  Frequency: 2x week  Duration in weeks: 12  Treatment plan discussed with: patient          ICD-10-CM ICD-9-CM   1. Osteoarthritis of carpometacarpal (CMC) joint of right thumb, unspecified osteoarthritis type  M18.11 715.34   2. Pain of right thumb  M79.644  729.5       Patient is indicated for skilled occupational therapy services.    History # of Personal Factors and/or Comorbidities: HIGH (3+)  Examination of Body System(s): # of elements: LOW (1-2)  Clinical Presentation: STABLE   Clinical Decision Making: MODERATE     Evaluation:  Low Complexity:    0     mins  00580;  Mod Complexity:    45     mins  73922;  High Complexity:    0     mins  53020;    Timed:  Manual Therapy:    0     mins  84158;  Therapeutic Exercise:    15     mins  32998;       Timed Treatment:   15   mins   Total Treatment:     60   mins      OT SIGNATURE: Fior Dela Cruz OTR/L    Electronically signed   License number [id number]   DATE TREATMENT INITIATED: 10/29/2021    Initial Certification  Certification Period: 10/29/2021 thru 1/26/2022  I certify that the therapy services are furnished while this patient is under my care.  The services outlined above are required by this patient, and will be reviewed every 90 days.     PHYSICIAN: Ophelia Shepard MD      DATE:     Please sign and return via fax to  [phone]   Thank you, [facility].

## 2021-11-02 ENCOUNTER — OFFICE VISIT (OUTPATIENT)
Dept: CARDIOLOGY | Facility: CLINIC | Age: 66
End: 2021-11-02

## 2021-11-02 ENCOUNTER — TRANSCRIBE ORDERS (OUTPATIENT)
Dept: PHYSICAL THERAPY | Facility: CLINIC | Age: 66
End: 2021-11-02

## 2021-11-02 ENCOUNTER — TREATMENT (OUTPATIENT)
Dept: PHYSICAL THERAPY | Facility: CLINIC | Age: 66
End: 2021-11-02

## 2021-11-02 VITALS
HEART RATE: 68 BPM | BODY MASS INDEX: 35.7 KG/M2 | DIASTOLIC BLOOD PRESSURE: 65 MMHG | WEIGHT: 241 LBS | HEIGHT: 69 IN | SYSTOLIC BLOOD PRESSURE: 138 MMHG

## 2021-11-02 DIAGNOSIS — E78.2 HYPERLIPEMIA, MIXED: ICD-10-CM

## 2021-11-02 DIAGNOSIS — R26.9 GAIT DISTURBANCE: ICD-10-CM

## 2021-11-02 DIAGNOSIS — M79.645 PAIN OF LEFT THUMB: Primary | ICD-10-CM

## 2021-11-02 DIAGNOSIS — M25.561 RIGHT KNEE PAIN, UNSPECIFIED CHRONICITY: Primary | ICD-10-CM

## 2021-11-02 DIAGNOSIS — R07.9 CHEST PAIN, UNSPECIFIED TYPE: Primary | ICD-10-CM

## 2021-11-02 DIAGNOSIS — I10 HYPERTENSION, ESSENTIAL: ICD-10-CM

## 2021-11-02 DIAGNOSIS — M22.2X1 RIGHT PATELLOFEMORAL SYNDROME: ICD-10-CM

## 2021-11-02 PROCEDURE — 97110 THERAPEUTIC EXERCISES: CPT | Performed by: PHYSICAL THERAPIST

## 2021-11-02 PROCEDURE — 97162 PT EVAL MOD COMPLEX 30 MIN: CPT | Performed by: PHYSICAL THERAPIST

## 2021-11-02 PROCEDURE — 99214 OFFICE O/P EST MOD 30 MIN: CPT | Performed by: SPECIALIST

## 2021-11-02 NOTE — PROGRESS NOTES
Physical Therapy Initial Evaluation and Plan of Care    Patient: Jean-Pierre Anaya   : 1955  Diagnosis/ICD-10 Code:  Right knee pain, unspecified chronicity [M25.561]  Referring practitioner: Ophelia Shepard MD  Date of Initial Visit: 2021  Today's Date: 2021  Patient seen for 1 sessions           Subjective Questionnaire: LEFS: 75%      Subjective Evaluation    History of Present Illness  Mechanism of injury: Pt presents for right knee pain ongoing for several months now, makes it difficult to walk long distances (also pain from his back with this), but cannot kneel on his knees either. He states the pain is actually worst at night trying to sleep.     Pain  Current pain ratin  At best pain ratin  At worst pain ratin  Quality: sharp and dull ache  Relieving factors: rest and change in position  Aggravating factors: standing and movement    Social Support  Lives in: multiple-level home  Lives with: spouse    Diagnostic Tests  X-ray: normal    Patient Goals  Patient goals for therapy: decreased pain, increased motion, increased strength and return to sport/leisure activities           PMH: diabetes, kidney disease, OA, asthma, Bacteriuria; Bilateral carpal tunnel syndrome; BPH (benign prostatic hyperplasia); ED (erectile dysfunction); Hyperlipemia; HLD (hyperlipidemia); HTN (hypertension); Benign essential hypertension; Hypogonadism in male; Leg swelling; Neuropathy; Preop examination; Prostate disorder; Seasonal allergies          Objective          Palpation     Additional Palpation Details  Mild tenderness distal quad just superior to patella    Active Range of Motion   Left Knee   Flexion: 125 degrees   Extension: 0 degrees     Right Knee   Flexion: 125 degrees   Extension: 0 degrees     Strength/Myotome Testing     Left Knee   Flexion: 5  Extension: 4+    Right Knee   Flexion: 4  Extension: 4    Ambulation     Comments   Mildly increased antalgia with right sided stance       See Exercise, Manual, and Modality Logs for complete treatment.     Assessment & Plan     Assessment  Impairments: abnormal gait, abnormal muscle firing, abnormal or restricted ROM, activity intolerance, impaired balance, impaired physical strength, pain with function and weight-bearing intolerance  Assessment details: The patient presents to physical therapy with complaints of right knee pain. The patient presents with associated knee weakness, stiffness, antalgic gait, and functional deficits (LEFS). The patient would benefit from skilled PT intervention to address the above mentioned functional limitations.     Prognosis: good  Functional Limitations: walking, standing and stooping  Goals  Plan Goals: KNEE PROBLEMS      2. The patient has limited strength of the right knee.   LTG 2: 12 weeks: The patient will demonstrate 5 /5 strength for knee flexion and extension in order to allow patient improved joint stability    STATUS:  New   STG 2a: 6 weeks: The patient will demonstrate 4+ /5 strength for knee flexion and extension    STATUS:  New    TREATMENT: Manual therapy, therapeutic exercise, home exercise instruction, aquatic therapy, and modalities as needed to include:  moist heat, electrical stimulation, ultrasound, and ice.     3. The patient has gait dysfunction.   LTG 3: 12 weeks:  The patient will ambulate without assistive device, independently, for community distances with normal biomechanics of the right lower extremity in order to improve mobility and allow patient to perform activities such as grocery shopping with greater ease.    STATUS:  New   STG 3a: 4 weeks: The patient will demonstrate independence with HEP.    STATUS:  New   TREATMENT: Gait training, aquatic therapy, therapeutic exercise, and home exercise instruction.    4. Mobility: Walking/Moving Around Functional Limitation     LTG 4: 12 weeks:  The patient will demonstrate <25 % limitation by achieving a score of >60/80 on the  LEFS.    STATUS:  New   TREATMENT:  Manual therapy, therapeutic exercise, home exercise instruction, and modalities as needed to include: moist heat, electrical stimulation, and ultrasound.       Plan  Therapy options: will be seen for skilled physical therapy services  Planned modality interventions: TENS, cryotherapy, thermotherapy (hydrocollator packs) and dry needling  Planned therapy interventions: functional ROM exercises, gait training, home exercise program, manual therapy, strengthening, stretching, therapeutic activities, soft tissue mobilization, joint mobilization, neuromuscular re-education, balance/weight-bearing training and transfer training  Other planned therapy interventions: aquatic therapy  Frequency: 3x week  Duration in weeks: 12  Treatment plan discussed with: patient        Visit Diagnoses:    ICD-10-CM ICD-9-CM   1. Right knee pain, unspecified chronicity  M25.561 719.46   2. Right patellofemoral syndrome  M22.2X1 719.46   3. Gait disturbance  R26.9 781.2       History # of Personal Factors and/or Comorbidities: HIGH (3+)  Examination of Body System(s): # of elements: MODERATE (3)  Clinical Presentation: STABLE   Clinical Decision Making: MODERATE      Timed:         Manual Therapy:    0     mins  77867;     Therapeutic Exercise:    15     mins  40918;     Neuromuscular Ye:    0    mins  04525;    Therapeutic Activity:     0     mins  94244;     Gait Trainin     mins  18755;     Ultrasound:     0     mins  71869;    Ionto                               0    mins   30945  Self Care                       0     mins   08724  Canalith Repos    0     mins 12007      Un-Timed:  Electrical Stimulation:    0     mins  75229 ( );  Dry Needling     0     mins self-pay  Traction     0     mins 12938  Low Eval     0     Mins  91568  Mod Eval     30     Mins  56466  High Eval                       0     Mins  25213  Re-Eval                           0    mins  34743    Timed Treatment:    15   mins   Total Treatment:     45   mins    PT SIGNATURE: Tavon Velasco PT     Electronically signed 11/2/2021    [place] License: PT - [id number]     Initial Certification  Certification Period: 11/2/2021 thru 1/30/2022  I certify that the therapy services are furnished while this patient is under my care.  The services outlined above are required by this patient, and will be reviewed every 90 days.     PHYSICIAN: Ophelia Shepard MD      DATE:     Please sign and return via fax to [phone]. Thank you, [facility].

## 2021-11-04 ENCOUNTER — TREATMENT (OUTPATIENT)
Dept: PHYSICAL THERAPY | Facility: CLINIC | Age: 66
End: 2021-11-04

## 2021-11-04 DIAGNOSIS — M18.12 OSTEOARTHRITIS OF CARPOMETACARPAL (CMC) JOINT OF LEFT THUMB, UNSPECIFIED OSTEOARTHRITIS TYPE: Primary | ICD-10-CM

## 2021-11-04 DIAGNOSIS — M79.645 PAIN OF LEFT THUMB: ICD-10-CM

## 2021-11-04 PROCEDURE — 97110 THERAPEUTIC EXERCISES: CPT | Performed by: PHYSICAL THERAPIST

## 2021-11-04 PROCEDURE — 97530 THERAPEUTIC ACTIVITIES: CPT | Performed by: PHYSICAL THERAPIST

## 2021-11-04 NOTE — PROGRESS NOTES
Occupational Therapy Daily Treatment Note      Patient: Jean-Pierre Anaya   : 1955  Referring practitioner: No ref. provider found  Date of Initial Visit: Type: THERAPY  Noted: 10/29/2021  Today's Date: 2021  Patient seen for 2 sessions         Jean-Pierre Anaya reports: it hurts when I reach my small finger or do some of the exercises. Pt has order for CMC comfort cool orthosis.        Subjective Evaluation    Pain  At best pain ratin  At worst pain ratin (with motion)           Objective   See Exercise, Manual, and Modality Logs for complete treatment.       Assessment/Plan    Visit Diagnoses:    ICD-10-CM ICD-9-CM   1. Osteoarthritis of carpometacarpal (CMC) joint of left thumb, unspecified osteoarthritis type  M18.12 715.34   2. Pain of left thumb  M79.645 729.5     Pt tolerated treatment well. Pt needs L+ size for comfort cool CMC orthosis and has to be ordered. Continue per POC         Timed:  Manual Therapy:    0     mins  04623;  Therapeutic Exercise:    15     mins  79576;     Therapeutic Activity:    10     mins  35571;    Timed Treatment:   25   mins   Total Treatment:     25   min    DIANNE Zamorano/L  Occupational Therapist    Electronically signed   License number [id number]

## 2021-11-11 ENCOUNTER — TREATMENT (OUTPATIENT)
Dept: PHYSICAL THERAPY | Facility: CLINIC | Age: 66
End: 2021-11-11

## 2021-11-11 DIAGNOSIS — M25.561 RIGHT KNEE PAIN, UNSPECIFIED CHRONICITY: Primary | ICD-10-CM

## 2021-11-11 DIAGNOSIS — M79.645 PAIN OF LEFT THUMB: ICD-10-CM

## 2021-11-11 DIAGNOSIS — R26.9 GAIT DISTURBANCE: ICD-10-CM

## 2021-11-11 DIAGNOSIS — M22.2X1 RIGHT PATELLOFEMORAL SYNDROME: ICD-10-CM

## 2021-11-11 DIAGNOSIS — M18.12 OSTEOARTHRITIS OF CARPOMETACARPAL (CMC) JOINT OF LEFT THUMB, UNSPECIFIED OSTEOARTHRITIS TYPE: Primary | ICD-10-CM

## 2021-11-11 PROCEDURE — 97110 THERAPEUTIC EXERCISES: CPT | Performed by: PHYSICAL THERAPIST

## 2021-11-11 PROCEDURE — 97530 THERAPEUTIC ACTIVITIES: CPT | Performed by: PHYSICAL THERAPIST

## 2021-11-11 NOTE — PROGRESS NOTES
Occupational Therapy Daily Treatment Note      Patient: Jean-Pierre Anaya   : 1955  Referring practitioner: Ophelia Shepard MD  Date of Initial Visit: Type: THERAPY  Noted: 10/29/2021  Today's Date: 2021  Patient seen for 3 sessions         Jean-Pierre Anaya reports: it hasn't been to bad since I have been resting it.        Subjective     Objective   See Exercise, Manual, and Modality Logs for complete treatment.       Assessment/Plan    Visit Diagnoses:    ICD-10-CM ICD-9-CM   1. Osteoarthritis of carpometacarpal (CMC) joint of left thumb, unspecified osteoarthritis type  M18.12 715.34   2. Pain of left thumb  M79.645 729.5       Continue per POC         Timed:  Manual Therapy:    0     mins  06731;  Therapeutic Exercise:    15     mins  76855;     Therapeutic Activity:    10     mins  76155;    Timed Treatment:   25   mins   Total Treatment:     25   min    Fior Dela Cruz OTR/L  Occupational Therapist    Electronically signed   License number [id number]

## 2021-11-11 NOTE — PROGRESS NOTES
"Physical Therapy Daily Treatment Note      Patient: Jean-Pierre Anaya   : 1955  Referring practitioner: Ophelia Shepard MD  Date of Initial Visit: Type: THERAPY  Noted: 2021  Today's Date: 2021  Patient seen for 2 sessions           Subjective  Jean-Pierre Anaya reports: pain about 3/10 at start of care. \"Now I had my foot planted and turned, twisted the knee and that hurt, sharp pain.\"       Objective   See Exercise, Manual, and Modality Logs for complete treatment.       Assessment/Plan  Pt advised that he felt fine when leaving session but typically has his worst pain at night when trying to sleep. Therapist directed exercises required to attend to deficits outlined in evaluation.  Visit Diagnoses:    ICD-10-CM ICD-9-CM   1. Right knee pain, unspecified chronicity  M25.561 719.46   2. Right patellofemoral syndrome  M22.2X1 719.46   3. Gait disturbance  R26.9 781.2       Progress per Plan of Care and Progress strengthening /stabilization /functional activity           Timed:  Manual Therapy:    5     mins  81536;  Therapeutic Exercise:    22     mins  57848;     Neuromuscular Ye:        mins  08053;    Therapeutic Activity:          mins  98338;     Gait Training:           mins  76659;     Ultrasound:          mins  14478;    Electrical Stimulation:         mins  68076 ( );  Aquatics  __   mins   62117    Untimed:  Electrical Stimulation:         mins  74425 ( );  Mechanical Traction:         mins  38015;     Timed Treatment:   27   mins   Total Treatment:     27   mins    Electronically Signed:  Radha Rowley PTA  Physical Therapist Assistant    [place] PTA license [id number]            "

## 2021-11-15 ENCOUNTER — TREATMENT (OUTPATIENT)
Dept: PHYSICAL THERAPY | Facility: CLINIC | Age: 66
End: 2021-11-15

## 2021-11-15 DIAGNOSIS — M79.645 PAIN OF LEFT THUMB: ICD-10-CM

## 2021-11-15 DIAGNOSIS — M18.12 OSTEOARTHRITIS OF CARPOMETACARPAL (CMC) JOINT OF LEFT THUMB, UNSPECIFIED OSTEOARTHRITIS TYPE: Primary | ICD-10-CM

## 2021-11-15 DIAGNOSIS — R26.9 GAIT DISTURBANCE: ICD-10-CM

## 2021-11-15 DIAGNOSIS — M25.561 RIGHT KNEE PAIN, UNSPECIFIED CHRONICITY: Primary | ICD-10-CM

## 2021-11-15 DIAGNOSIS — M22.2X1 RIGHT PATELLOFEMORAL SYNDROME: ICD-10-CM

## 2021-11-15 PROCEDURE — 97110 THERAPEUTIC EXERCISES: CPT | Performed by: PHYSICAL THERAPIST

## 2021-11-15 PROCEDURE — 97530 THERAPEUTIC ACTIVITIES: CPT | Performed by: PHYSICAL THERAPIST

## 2021-11-15 NOTE — PROGRESS NOTES
Occupational Therapy Daily Treatment Note      Patient: Jean-Pierre Anaya   : 1955  Referring practitioner: Ophelia Shepard MD  Date of Initial Visit: Type: THERAPY  Noted: 10/29/2021  Today's Date: 11/15/2021  Patient seen for 4 sessions         Jean-Pierre Anaya reports: no changes.        Subjective     Objective   See Exercise, Manual, and Modality Logs for complete treatment.       Assessment/Plan    Visit Diagnoses:    ICD-10-CM ICD-9-CM   1. Osteoarthritis of carpometacarpal (CMC) joint of left thumb, unspecified osteoarthritis type  M18.12 715.34   2. Pain of left thumb  M79.645 729.5     Pt tolerated treatment well for range of motion and pain control. Continue per POC         Timed:  Manual Therapy:    0     mins  22220;  Therapeutic Exercise:    10     mins  98457;     Therapeutic Activity:    15     mins  50023;    Timed Treatment:   25   mins   Total Treatment:     25   min    Fior Dela Cruz OTR/L  Occupational Therapist    Electronically signed   License number [id number]

## 2021-11-15 NOTE — PROGRESS NOTES
Physical Therapy Daily Progress Note    Patient: Jean-Pierre Anaya   : 1955  Diagnosis/ICD-10 Code:  Right knee pain, unspecified chronicity [M25.561]  Referring practitioner: Elbert Magallanes MD  Date of Initial Visit: Type: THERAPY  Noted: 2021  Today's Date: 11/15/2021  Patient seen for 3 sessions           Subjective Evaluation    History of Present Illness    Subjective comment: Pt reporting his knee is feeling better, although, he hasn't done much this week as he had cataract surgery and has not been doing much. Pain  Current pain ratin           Objective   See Exercise, Manual, and Modality Logs for complete treatment.       Assessment & Plan     Assessment  Assessment details: Pt tolerated today's session well continued focus on quad strengthening and patellar mobility. Pt would benefit from continued skilled therapy to address deficits. Progress per plan of care.                 Manual Therapy:    5     mins  96268;  Therapeutic Exercise:    20     mins  93040;     Neuromuscular Ye:    0    mins  54472;    Therapeutic Activity:     0     mins  10218;     Gait Trainin     mins  10738;     Ultrasound:     0     mins  50312;    Electrical Stimulation:    0     mins  04274 ( );  Dry Needling     0     mins self-pay;  Aquatic Therapy    0     mins  79424;  Mechanical Traction    0     mins  54636  Moist Heat     0     mins  No charge    Timed Treatment:   25   mins   Total Treatment:     25   mins    Tavon Velasco PT  Physical Therapist    Electronically signed 11/15/2021    [place] License: PT - [id number]

## 2021-11-17 ENCOUNTER — TREATMENT (OUTPATIENT)
Dept: PHYSICAL THERAPY | Facility: CLINIC | Age: 66
End: 2021-11-17

## 2021-11-17 DIAGNOSIS — M22.2X1 RIGHT PATELLOFEMORAL SYNDROME: ICD-10-CM

## 2021-11-17 DIAGNOSIS — M25.561 RIGHT KNEE PAIN, UNSPECIFIED CHRONICITY: Primary | ICD-10-CM

## 2021-11-17 DIAGNOSIS — R26.9 GAIT DISTURBANCE: ICD-10-CM

## 2021-11-17 DIAGNOSIS — M79.645 PAIN OF LEFT THUMB: ICD-10-CM

## 2021-11-17 DIAGNOSIS — M18.12 OSTEOARTHRITIS OF CARPOMETACARPAL (CMC) JOINT OF LEFT THUMB, UNSPECIFIED OSTEOARTHRITIS TYPE: Primary | ICD-10-CM

## 2021-11-17 PROCEDURE — 97110 THERAPEUTIC EXERCISES: CPT | Performed by: PHYSICAL THERAPIST

## 2021-11-17 PROCEDURE — 97530 THERAPEUTIC ACTIVITIES: CPT | Performed by: PHYSICAL THERAPIST

## 2021-11-17 PROCEDURE — 97140 MANUAL THERAPY 1/> REGIONS: CPT | Performed by: PHYSICAL THERAPIST

## 2021-11-17 NOTE — PROGRESS NOTES
Physical Therapy Daily Progress Note        Patient: Jean-Pierre Anaya   : 1955  Diagnosis/ICD-10 Code:  Right knee pain, unspecified chronicity [M25.561]  Referring practitioner: Ophelia Shepard MD  Date of Initial Visit: Type: THERAPY  Noted: 2021  Today's Date: 2021  Patient seen for 4 sessions             Subjective   Jean-Pierre Anaya reports: knee feeling a little better since first starting PT session.    Objective   No complaints of increased pain or discomfort.     See Exercise, Manual, and Modality Logs for complete treatment.       Assessment/Plan  Jean-Pierre progressing as evident by decreased overall knee pain. Pt tolerated exercises well, no complaints of increased pain or discomfort. Pt would benefit from skilled PT to address Range of Motion  and Strength deficits, pain management and any concerns with ADLs.       Progress per Plan of Care           Timed:  Manual Therapy:    10     mins  82986;  Therapeutic Exercise:    20     mins  43403;     Neuromuscular Ye:        mins  55553;    Therapeutic Activity:          mins  55770;     Gait Training:           mins  47571;    Aquatic Therapy:          mins  20049;       Untimed:  Electrical Stimulation:         mins  75379 ( );  Mechanical Traction:         mins  19225;       Timed Treatment:   30   mins   Total Treatment:     30   mins      Electronically signed:   Cate Velasco PTA  Physical Therapist Assistant  [facility] License #: [id number]

## 2021-11-17 NOTE — PROGRESS NOTES
Occupational Therapy Daily Treatment Note      Patient: Jean-Pierre Anaya   : 1955  Referring practitioner: Ophelia Shepard MD  Date of Initial Visit: Type: THERAPY  Noted: 10/29/2021  Today's Date: 2021  Patient seen for 5 sessions         Jean-Pierre Anaya reports: I am not really seeing any changes, but it is not worse.        Subjective     Objective   See Exercise, Manual, and Modality Logs for complete treatment.       Assessment/Plan    Visit Diagnoses:    ICD-10-CM ICD-9-CM   1. Osteoarthritis of carpometacarpal (CMC) joint of left thumb, unspecified osteoarthritis type  M18.12 715.34   2. Pain of left thumb  M79.645 729.5     Pt tolerated treatment well with no increase in pain. Continue per POC         Timed:  Manual Therapy:    0     mins  53308;  Therapeutic Exercise:    10     mins  37453;     Therapeutic Activity:    15     mins  16885;    Timed Treatment:   25   mins   Total Treatment:     25   min    DIANNE Zamorano/L  Occupational Therapist    Electronically signed   License number [id number]

## 2021-12-07 ENCOUNTER — LAB (OUTPATIENT)
Dept: UROLOGY | Facility: CLINIC | Age: 66
End: 2021-12-07

## 2021-12-07 ENCOUNTER — PREP FOR SURGERY (OUTPATIENT)
Dept: OTHER | Facility: HOSPITAL | Age: 66
End: 2021-12-07

## 2021-12-07 DIAGNOSIS — N52.9 IMPOTENCE DUE TO ERECTILE DYSFUNCTION: Primary | ICD-10-CM

## 2021-12-07 DIAGNOSIS — N39.0 URINARY TRACT INFECTION WITHOUT HEMATURIA, SITE UNSPECIFIED: Primary | ICD-10-CM

## 2021-12-07 PROCEDURE — 87086 URINE CULTURE/COLONY COUNT: CPT | Performed by: UROLOGY

## 2021-12-07 RX ORDER — SODIUM CHLORIDE 0.9 % (FLUSH) 0.9 %
10 SYRINGE (ML) INJECTION EVERY 12 HOURS SCHEDULED
Status: CANCELLED | OUTPATIENT
Start: 2021-12-07

## 2021-12-07 RX ORDER — SODIUM CHLORIDE, SODIUM LACTATE, POTASSIUM CHLORIDE, CALCIUM CHLORIDE 600; 310; 30; 20 MG/100ML; MG/100ML; MG/100ML; MG/100ML
100 INJECTION, SOLUTION INTRAVENOUS CONTINUOUS
Status: CANCELLED | OUTPATIENT
Start: 2021-12-07

## 2021-12-07 RX ORDER — SODIUM CHLORIDE 0.9 % (FLUSH) 0.9 %
10 SYRINGE (ML) INJECTION AS NEEDED
Status: CANCELLED | OUTPATIENT
Start: 2021-12-07

## 2021-12-07 RX ORDER — ONDANSETRON 2 MG/ML
4 INJECTION INTRAMUSCULAR; INTRAVENOUS EVERY 6 HOURS PRN
Status: CANCELLED | OUTPATIENT
Start: 2021-12-07

## 2021-12-08 ENCOUNTER — TREATMENT (OUTPATIENT)
Dept: PHYSICAL THERAPY | Facility: CLINIC | Age: 66
End: 2021-12-08

## 2021-12-08 DIAGNOSIS — M25.561 RIGHT KNEE PAIN, UNSPECIFIED CHRONICITY: Primary | ICD-10-CM

## 2021-12-08 DIAGNOSIS — R26.9 GAIT DISTURBANCE: ICD-10-CM

## 2021-12-08 DIAGNOSIS — M22.2X1 RIGHT PATELLOFEMORAL SYNDROME: ICD-10-CM

## 2021-12-08 DIAGNOSIS — M79.645 PAIN OF LEFT THUMB: ICD-10-CM

## 2021-12-08 DIAGNOSIS — M18.12 OSTEOARTHRITIS OF CARPOMETACARPAL (CMC) JOINT OF LEFT THUMB, UNSPECIFIED OSTEOARTHRITIS TYPE: Primary | ICD-10-CM

## 2021-12-08 PROCEDURE — 97530 THERAPEUTIC ACTIVITIES: CPT | Performed by: PHYSICAL THERAPIST

## 2021-12-08 PROCEDURE — 97110 THERAPEUTIC EXERCISES: CPT | Performed by: PHYSICAL THERAPIST

## 2021-12-08 PROCEDURE — L3923 HFO WITHOUT JOINTS PRE CST: HCPCS | Performed by: PHYSICAL THERAPIST

## 2021-12-08 NOTE — PROGRESS NOTES
Re-Assessment / Re-Certification      Patient: Jean-Pierre Anaya   : 1955  Diagnosis/ICD-10 Code:  Osteoarthritis of carpometacarpal (CMC) joint of left thumb, unspecified osteoarthritis type [M18.12]  Referring practitioner: Elbert Magallanes MD  Date of Initial Visit: Type: THERAPY  Noted: 10/29/2021  Today's Date: 2021  Patient seen for 6 sessions      Subjective:   Jean-Pierre Anaya reports: his L hand is now beginning to hurt some and he hasn't had much change.  Clinical Progress: unchanged  Home Program Compliance: Yes  Treatment has included: therapeutic exercise, manual therapy and therapeutic activity    Subjective Evaluation    Pain  Current pain rating: 3  At best pain ratin  At worst pain rating: 10         Objective          Observations     Additional Wrist/Hand Observation Details  No abnormalities noted    Neurological Testing     Sensation     Wrist/Hand   Left   Intact: light touch    Active Range of Motion     Left Wrist   Wrist flexion: WFL  Wrist extension: WFL  Radial deviation: WFL  Ulnar deviation: WFL      Left Thumb   Flexion    MP: 40 degrees    DIP: 60 degrees  Opposition: Tip small finger    Strength/Myotome Testing     Left Wrist/Hand      (2nd hand position)     Trial 1: 82 lbs    Trial 2: 90 lbs    Trial 3: 90 lbs    Average: 87.33 lbs    Thumb Strength  Key/Lateral Pinch     Trial 1: 18 lbs    Trial 2: 22 lbs    Trial 3: 20 lbs    Average: 20 lbs    Right Wrist/Hand      (2nd hand position)     Trial 1: 107 lbs    Trial 2: 107 lbs    Trial 3: 102 lbs    Average: 105.33 lbs    Thumb Strength   Key/Lateral Pinch     Trial 1: 22 lbs    Trial 2: 22 lbs    Trial 3: 22 lbs    Average: 22 lbs     General Comments     Wrist/Hand Comments  Splinting:  Patient was measure and fit with a custom fabricated HFO prefab.   Patient was instructed in wearing schedule, precautions and care of the splint during this visit.   Patient was instructed in proper donning/doffing of  splint.   Assessment:  Patient was fitted and appropriate splint was fabricated this date.  Patient reported that splint was comfortable and had no complications with the fit of the splint.  Patient was instructed and patient verbalized understanding of precautions, wear and care of the splint.   Patient demonstrated independent donning/doffing of splint during treatment today.  Goals:  Patient was fitted properly with appropriate splint for diagnosis  Patient was educated on precautions, wear schedule and care of splint  Patient demonstrated independence with donning/doffing of the splint.  Splint was provided to Protect Healing Structures, Restrict Mobility, Improve joint alignment.  Plan:  No additional treatment is required for this patient at this time. The patient is therefore discharged from therapy.  Patient advised to contact therapist with any additional questions or concerns regarding the fit and function of the splint.  Patient will be seen for splint issues as needed   Wear Instructions: Off for hygiene            Assessment & Plan     Assessment  Impairments: activity intolerance, impaired physical strength and pain with function  Functional Limitations: carrying objects, lifting, sleeping, uncomfortable because of pain and unable to perform repetitive tasksPrognosis: good    Goals  Plan Goals: 1. The patient complains of pain in the L thumb.                  LTG 1: 12 weeks:  The patient will report a pain rating of 0/10 or better in order to improve sleep quality and tolerance to performance of activities of daily living.                                  STATUS:  Not met                  STG 1a: 6weeks:  The patient will report a pain rating of 5/10 or better at worst.                                   STATUS:  Not met                   STG 1b: 2 weeks: The patient will have good orthotic fit to decrease pain and support L thumb.                                  STATUS: Met  2. Carrying, Moving, and  Handling Objects Functional Limitation                                   LTG 2: 12 weeks:  The patient will achieve a score of 15/55 on the Quick DASH.                                  STATUS:  New                                                  STATUS:  New                        Plan  Therapy options: will be seen for skilled therapy services  Planned modality interventions: TENS, thermotherapy (hydrocollator packs) and thermotherapy (paraffin bath)  Other planned modality interventions: fluidotherapy  Planned therapy interventions: functional ROM exercises, home exercise program, strengthening, neuromuscular re-education, manual therapy, fine motor coordination training, soft tissue mobilization, therapeutic activities and orthotic fitting/training  Frequency: 2x week  Duration in weeks: 4  Treatment plan discussed with: patient        Visit Diagnoses:    ICD-10-CM ICD-9-CM   1. Osteoarthritis of carpometacarpal (CMC) joint of left thumb, unspecified osteoarthritis type  M18.12 715.34   2. Pain of left thumb  M79.645 729.5       Progress toward previous goals: Not Met        Recommendations: Continue as planned  Timeframe: 1 month  Prognosis to achieve goals: good    OT Signature: DIANNE Zamorano/L    Electronically signed   License number [id number]      Timed:  Manual Therapy:    0     mins  67174;  Therapeutic Exercise:    10     mins  50726;  Therapeutic Activity:    15     mins  47025;    Untimed:  Prefab HFO:    8    mins  L 3923    Timed Treatment:   25   mins   Total Treatment:     33   mins

## 2021-12-08 NOTE — PROGRESS NOTES
Progress Assessment        Patient: Jean-Pierre Anaya   : 1955  Diagnosis/ICD-10 Code:  Right knee pain, unspecified chronicity [M25.561]  Referring practitioner: Ophelia Shepard MD  Date of Initial Visit: Type: THERAPY  Noted: 2021  Today's Date: 2021  Patient seen for 5 sessions      Subjective:     Subjective Questionnaire: LEFS: 75/80  Clinical Progress: improved  Home Program Compliance: Yes  Treatment has included: therapeutic exercise, neuromuscular re-education and therapeutic activity    Subjective Evaluation    History of Present Illness  Mechanism of injury: Pt reporting he is much improved since starting therapy. He is having less pain at night and is able to do more in general. Pt reporting he is feeling like the exercises have been very helpful.      Pain  Current pain ratin         Objective   Palpation     Additional Palpation Details  Mild tenderness distal quad just superior to patella     Active Range of Motion   Left Knee   Flexion: 125 degrees   Extension: 0 degrees      Right Knee   Flexion: 125 degrees   Extension: 0 degrees      Strength/Myotome Testing      Left Knee   Flexion: 5  Extension: 5     Right Knee   Flexion: 4+  Extension: 4+           Assessment & Plan     Assessment  Impairments: abnormal gait, abnormal muscle firing, abnormal or restricted ROM, activity intolerance, impaired balance, impaired physical strength, pain with function and weight-bearing intolerance  Functional Limitations: walking, standing and stooping  Assessment details: The patient presents to physical therapy with complaints of right knee pain. The patient presents with associated knee weakness, stiffness, antalgic gait, and functional deficits (LEFS). He has progressed with subjective reporting, muscle strength, and demonstrates improved ambulation. He will work on his HEP for 2-3 weeks, then return for a follow up to assess response to independent HEP.  The patient would benefit from  skilled PT intervention to address the above mentioned functional limitations.    Prognosis: good    Goals  Plan Goals: KNEE PROBLEMS      2. The patient has limited strength of the right knee.   LTG 2: 12 weeks: The patient will demonstrate 5 /5 strength for knee flexion and extension in order to allow patient improved joint stability    STATUS:  Not met   STG 2a: 6 weeks: The patient will demonstrate 4+ /5 strength for knee flexion and extension    STATUS:  MET    TREATMENT: Manual therapy, therapeutic exercise, home exercise instruction, aquatic therapy, and modalities as needed to include:  moist heat, electrical stimulation, ultrasound, and ice.     3. The patient has gait dysfunction.   LTG 3: 12 weeks:  The patient will ambulate without assistive device, independently, for community distances with normal biomechanics of the right lower extremity in order to improve mobility and allow patient to perform activities such as grocery shopping with greater ease.    STATUS:  MET   STG 3a: 4 weeks: The patient will demonstrate independence with HEP.    STATUS:  MET   TREATMENT: Gait training, aquatic therapy, therapeutic exercise, and home exercise instruction.    4. Mobility: Walking/Moving Around Functional Limitation     LTG 4: 12 weeks:  The patient will demonstrate <25 % limitation by achieving a score of >60/80 on the LEFS.    STATUS:  MET   TREATMENT:  Manual therapy, therapeutic exercise, home exercise instruction, and modalities as needed to include: moist heat, electrical stimulation, and ultrasound.       Plan  Therapy options: will be seen for skilled therapy services  Planned modality interventions: TENS, cryotherapy, thermotherapy (hydrocollator packs) and dry needling  Planned therapy interventions: functional ROM exercises, gait training, home exercise program, manual therapy, strengthening, stretching, therapeutic activities, soft tissue mobilization, joint mobilization, neuromuscular re-education,  balance/weight-bearing training and transfer training  Other planned therapy interventions: aquatic therapy  Frequency: 3x week  Duration in weeks: 12  Treatment plan discussed with: patient      Progress toward previous goals: Partially Met    See Exercise, Manual, and Modality Logs for complete treatment.         Recommendations: Continue as planned  Timeframe: 2 months  Prognosis to achieve goals: good    PT Signature: Tavon Velasco, PT    Electronically signed 2021    [place] License: PT - [id number]     Based upon review of the patient's progress and continued therapy plan, it is my medical opinion that Jean-Pierre Anaya should continue physical therapy treatment at [facility]  [address]   [place] [address] [phone].      Timed:         Manual Therapy:    0     mins  11558;     Therapeutic Exercise:    25     mins  19937;     Neuromuscular Ye:    0    mins  24367;    Therapeutic Activity:     0     mins  54705;     Gait Trainin     mins  85688;     Ultrasound:     0     mins  48209;    Ionto                               0    mins   44263  Self Care                       0     mins   83018  Aquatic                          0     mins 87171          Timed Treatment:   25   mins   Total Treatment:     25   mins      I certify that the therapy services are furnished while this patient is under my care.  The services outlined above are required by this patient, and will be reviewed every 90 days.

## 2021-12-09 LAB
BACTERIA UR CULT: NO GROWTH
BACTERIA UR CULT: NORMAL

## 2021-12-09 RX ORDER — AZELASTINE HYDROCHLORIDE 137 UG/1
1-2 SPRAY, METERED NASAL 2 TIMES DAILY
COMMUNITY
Start: 2021-10-25

## 2021-12-09 NOTE — PRE-PROCEDURE INSTRUCTIONS
Pt instructed no vitamins, supplements, or NSAIDs for 1 week prior to procedure. Pt to take carvedilol, allopurinol, and gabapentin AM of surgery. Verbalized understanding.

## 2021-12-10 ENCOUNTER — ANESTHESIA EVENT (OUTPATIENT)
Dept: PERIOP | Facility: HOSPITAL | Age: 66
End: 2021-12-10

## 2021-12-10 ENCOUNTER — TELEPHONE (OUTPATIENT)
Dept: PHYSICAL THERAPY | Facility: CLINIC | Age: 66
End: 2021-12-10

## 2021-12-12 PROBLEM — E11.9 TYPE 2 DIABETES MELLITUS: Status: ACTIVE | Noted: 2021-12-12

## 2021-12-12 NOTE — H&P
[facility]   HISTORY AND PHYSICAL    Patient Name: Jean-Pierre Anaya  : 1955  MRN: [id number]  Primary Care Physician:  Ian Wilson MD  Date of admission: 2021  Subjective   Subjective     Chief Complaint: Erectile dysfunction    HPI:    Jean-Pierre Anaya is a 66 y.o. male has erectile dysfunction for several years and is secondary to diabetes mellitus.  Patient has tried PDE 5 inhibitors and penile injection without any success.  Patient request to have a penile implant    Review of Systems    Please see past medical and surgical history.  Rest of the systems are negative    Personal History     Past Medical History:   Diagnosis Date   • Abnormal EKG    • Arthritis    • Asthma    • Bacteriuria 2021   • Benign essential hypertension    • Bilateral carpal tunnel syndrome 2019   • BPH (benign prostatic hyperplasia)    • Chronic kidney disease     stage 3   • Diabetes (HCC)    • Diabetes mellitus type 2, noninsulin dependent (HCC)    • ED (erectile dysfunction)    • HLD (hyperlipidemia)    • HTN (hypertension)    • Hyperlipemia    • Hypogonadism in male    • Leg swelling    • Neuropathy    • Preop examination 2019    BILATERAL CTS WANTS TO HAVE RIGHT CTR BEFORE LEFT   • Prostate disorder    • Renal calculus or stone    • Seasonal allergies        Past Surgical History:   Procedure Laterality Date   • CARDIAC CATHETERIZATION     • CARPAL TUNNEL RELEASE Right 2019    CTR   • CATARACT EXTRACTION, BILATERAL  2021   • COLONOSCOPY     • CYSTOSCOPY     • EYE SURGERY      EYE IMPLANT   • KIDNEY STONE SURGERY     • PROSTATE LASER ABLATION/ENUCLEATION     • TUMOR REMOVAL      fatty tumor- from back   • TURP / TRANSURETHRAL INCISION / DRAINAGE PROSTATE  2017       Family History: family history includes Arthritis in his mother; Breast cancer in his mother; Kidney cancer in his father; Lung cancer in his father; Osteoporosis in his mother. Otherwise pertinent FHx was  reviewed and not pertinent to current issue.    Social History:  reports that he has never smoked. He has never used smokeless tobacco. He reports that he does not drink alcohol and does not use drugs.    Home Medications:  Azelastine HCl, Cholecalciferol, Diclofenac Sodium, Insulin Glargine (1 Unit Dial), Insulin Pen Needle, Omega-3, Semaglutide(0.25 or 0.5MG/DOS), albuterol sulfate HFA, allopurinol, aspirin, atorvastatin, carvedilol, fenofibrate, fexofenadine, fluticasone, gabapentin, insulin lispro, montelukast, potassium citrate, and traMADol      Allergies:  No Known Allergies    Objective   Objective     Vitals: Blood pressure 145/70.  Pulse 70 temperature 96.9     Physical Exam     Physical Exam  Constitutional:       General: He is not in acute distress.     Appearance: Normal appearance. He is obese. He is not ill-appearing or toxic-appearing.   HENT:      Head: Normocephalic and atraumatic.      Nose: Nose normal.   Eyes:      Pupils: Pupils are equal, round, and reactive to light.   Cardiovascular:      Rate and Rhythm: Normal rate and regular rhythm.      Pulses: Normal pulses.      Heart sounds: Normal heart sounds. No murmur heard.      Pulmonary:      Effort: Pulmonary effort is normal.      Breath sounds: Normal breath sounds. No rhonchi or rales.   Abdominal:      Palpations: Abdomen is soft.      Tenderness: There is no abdominal tenderness. There is no right CVA tenderness or left CVA tenderness.   Genitourinary:     Penis: Normal.       Testes: Normal.      Prostate: Normal.      Rectum: Normal.   Musculoskeletal:         General: No swelling. Normal range of motion.      Cervical back: Normal range of motion and neck supple. No rigidity or tenderness.   Lymphadenopathy:      Cervical: No cervical adenopathy.   Skin:     General: Skin is warm.      Coloration: Skin is not jaundiced.   Neurological:      General: No focal deficit present.      Mental Status: He is alert and oriented to person,  place, and time.      Motor: No weakness.      Gait: Gait normal.   Psychiatric:         Mood and Affect: Mood normal.         Behavior: Behavior normal.         Thought Content: Thought content normal.         Judgment: Judgment normal.         Result Review    Result Review:  I have personally reviewed the results from the time of this admission to 12/12/2021 09:21 EST and agree with these findings:  []  Laboratory  []  Microbiology  []  Radiology  []  EKG/Telemetry   []  Cardiology/Vascular   []  Pathology  []  Old records  []  Other:  Most notable findings include: Erectile dysfunction    Assessment/Plan   Assessment / Plan     Brief Patient Summary:  Jean-Pierre Anaya is a 66 y.o. male who has erectile dysfunction secondary to diabetes mellitus    Active Hospital Problems:  Active Hospital Problems    Diagnosis    • **Impotence due to erectile dysfunction      Added automatically from request for surgery [id number]     • Type 2 diabetes mellitus (HCC)        Plan: We are going to do insertion of penile implant.  Risk benefits and alternate treatment has been discussed with the patient especially infection bleeding and injury to penis lower abdomen and urinary bladder and accepted by the patient      DVT prophylaxis:  No DVT prophylaxis order currently exists.    CODE STATUS:       Admission Status:  I believe this patient meets outpatient observation status.    Electronically signed by Serafin Pereira MD, 12/12/21, 9:11 AM EST.

## 2021-12-13 ENCOUNTER — HOSPITAL ENCOUNTER (OUTPATIENT)
Facility: HOSPITAL | Age: 66
Discharge: HOME | End: 2021-12-14
Attending: UROLOGY | Admitting: UROLOGY

## 2021-12-13 ENCOUNTER — ANESTHESIA (OUTPATIENT)
Dept: PERIOP | Facility: HOSPITAL | Age: 66
End: 2021-12-13

## 2021-12-13 DIAGNOSIS — N52.9 IMPOTENCE DUE TO ERECTILE DYSFUNCTION: Primary | ICD-10-CM

## 2021-12-13 DIAGNOSIS — Z79.4 TYPE 2 DIABETES MELLITUS WITH HYPEROSMOLAR COMA, WITH LONG-TERM CURRENT USE OF INSULIN (HCC): ICD-10-CM

## 2021-12-13 DIAGNOSIS — E11.01 TYPE 2 DIABETES MELLITUS WITH HYPEROSMOLAR COMA, WITH LONG-TERM CURRENT USE OF INSULIN (HCC): ICD-10-CM

## 2021-12-13 LAB
ALBUMIN SERPL-MCNC: 4 G/DL (ref 3.5–5.2)
ALBUMIN/GLOB SERPL: 1.9 G/DL
ALP SERPL-CCNC: 71 U/L (ref 39–117)
ALT SERPL W P-5'-P-CCNC: 29 U/L (ref 1–41)
ANION GAP SERPL CALCULATED.3IONS-SCNC: 9.3 MMOL/L (ref 5–15)
AST SERPL-CCNC: 27 U/L (ref 1–40)
BASOPHILS # BLD AUTO: 0.04 10*3/MM3 (ref 0–0.2)
BASOPHILS NFR BLD AUTO: 0.6 % (ref 0–1.5)
BILIRUB SERPL-MCNC: 0.3 MG/DL (ref 0–1.2)
BUN SERPL-MCNC: 20 MG/DL (ref 8–23)
BUN/CREAT SERPL: 13.2 (ref 7–25)
CALCIUM SPEC-SCNC: 8.9 MG/DL (ref 8.6–10.5)
CHLORIDE SERPL-SCNC: 107 MMOL/L (ref 98–107)
CO2 SERPL-SCNC: 23.7 MMOL/L (ref 22–29)
CREAT SERPL-MCNC: 1.51 MG/DL (ref 0.76–1.27)
DEPRECATED RDW RBC AUTO: 38.8 FL (ref 37–54)
EOSINOPHIL # BLD AUTO: 0.22 10*3/MM3 (ref 0–0.4)
EOSINOPHIL NFR BLD AUTO: 3.1 % (ref 0.3–6.2)
ERYTHROCYTE [DISTWIDTH] IN BLOOD BY AUTOMATED COUNT: 12.1 % (ref 12.3–15.4)
GFR SERPL CREATININE-BSD FRML MDRD: 46 ML/MIN/1.73
GLOBULIN UR ELPH-MCNC: 2.1 GM/DL
GLUCOSE BLDC GLUCOMTR-MCNC: 125 MG/DL (ref 70–99)
GLUCOSE BLDC GLUCOMTR-MCNC: 169 MG/DL (ref 70–99)
GLUCOSE BLDC GLUCOMTR-MCNC: 183 MG/DL (ref 70–99)
GLUCOSE SERPL-MCNC: 162 MG/DL (ref 65–99)
HCT VFR BLD AUTO: 42.8 % (ref 37.5–51)
HGB BLD-MCNC: 14.9 G/DL (ref 13–17.7)
IMM GRANULOCYTES # BLD AUTO: 0.04 10*3/MM3 (ref 0–0.05)
IMM GRANULOCYTES NFR BLD AUTO: 0.6 % (ref 0–0.5)
LYMPHOCYTES # BLD AUTO: 1.78 10*3/MM3 (ref 0.7–3.1)
LYMPHOCYTES NFR BLD AUTO: 24.8 % (ref 19.6–45.3)
MCH RBC QN AUTO: 30.3 PG (ref 26.6–33)
MCHC RBC AUTO-ENTMCNC: 34.8 G/DL (ref 31.5–35.7)
MCV RBC AUTO: 87.2 FL (ref 79–97)
MONOCYTES # BLD AUTO: 0.69 10*3/MM3 (ref 0.1–0.9)
MONOCYTES NFR BLD AUTO: 9.6 % (ref 5–12)
NEUTROPHILS NFR BLD AUTO: 4.42 10*3/MM3 (ref 1.7–7)
NEUTROPHILS NFR BLD AUTO: 61.3 % (ref 42.7–76)
NRBC BLD AUTO-RTO: 0 /100 WBC (ref 0–0.2)
PLATELET # BLD AUTO: 209 10*3/MM3 (ref 140–450)
PMV BLD AUTO: 11.2 FL (ref 6–12)
POTASSIUM SERPL-SCNC: 4.3 MMOL/L (ref 3.5–5.2)
PROT SERPL-MCNC: 6.1 G/DL (ref 6–8.5)
RBC # BLD AUTO: 4.91 10*6/MM3 (ref 4.14–5.8)
SODIUM SERPL-SCNC: 140 MMOL/L (ref 136–145)
WBC NRBC COR # BLD: 7.19 10*3/MM3 (ref 3.4–10.8)

## 2021-12-13 PROCEDURE — 85025 COMPLETE CBC W/AUTO DIFF WBC: CPT | Performed by: UROLOGY

## 2021-12-13 PROCEDURE — A9270 NON-COVERED ITEM OR SERVICE: HCPCS | Performed by: UROLOGY

## 2021-12-13 PROCEDURE — 25010000002 MIDAZOLAM PER 1MG: Performed by: STUDENT IN AN ORGANIZED HEALTH CARE EDUCATION/TRAINING PROGRAM

## 2021-12-13 PROCEDURE — 25010000002 PROPOFOL 10 MG/ML EMULSION: Performed by: NURSE ANESTHETIST, CERTIFIED REGISTERED

## 2021-12-13 PROCEDURE — 63710000001 ATORVASTATIN 20 MG TABLET: Performed by: UROLOGY

## 2021-12-13 PROCEDURE — 25010000002 HYDROMORPHONE 1 MG/ML SOLUTION: Performed by: UROLOGY

## 2021-12-13 PROCEDURE — 94761 N-INVAS EAR/PLS OXIMETRY MLT: CPT

## 2021-12-13 PROCEDURE — 25010000002 ONDANSETRON PER 1 MG: Performed by: NURSE ANESTHETIST, CERTIFIED REGISTERED

## 2021-12-13 PROCEDURE — 94799 UNLISTED PULMONARY SVC/PX: CPT

## 2021-12-13 PROCEDURE — 25010000002 HYDROMORPHONE 1 MG/ML SOLUTION: Performed by: NURSE ANESTHETIST, CERTIFIED REGISTERED

## 2021-12-13 PROCEDURE — 63710000001 OXYCODONE-ACETAMINOPHEN 10-325 MG TABLET: Performed by: UROLOGY

## 2021-12-13 PROCEDURE — 63710000001 FLUTICASONE 50 MCG/ACT SUSPENSION 16 G BOTTLE: Performed by: UROLOGY

## 2021-12-13 PROCEDURE — 63710000001 CARVEDILOL 3.125 MG TABLET: Performed by: UROLOGY

## 2021-12-13 PROCEDURE — A9270 NON-COVERED ITEM OR SERVICE: HCPCS | Performed by: NURSE ANESTHETIST, CERTIFIED REGISTERED

## 2021-12-13 PROCEDURE — 25010000002 KETOROLAC TROMETHAMINE PER 15 MG: Performed by: UROLOGY

## 2021-12-13 PROCEDURE — 63710000001 TRAMADOL 50 MG TABLET: Performed by: UROLOGY

## 2021-12-13 PROCEDURE — 63710000001 MUPIROCIN 2 % OINTMENT 22 G TUBE: Performed by: UROLOGY

## 2021-12-13 PROCEDURE — 63710000001 CETIRIZINE 10 MG TABLET: Performed by: UROLOGY

## 2021-12-13 PROCEDURE — 80053 COMPREHEN METABOLIC PANEL: CPT | Performed by: UROLOGY

## 2021-12-13 PROCEDURE — 25010000002 VANCOMYCIN 1 G RECONSTITUTED SOLUTION: Performed by: UROLOGY

## 2021-12-13 PROCEDURE — 63710000001 HYDROCODONE-ACETAMINOPHEN 10-325 MG TABLET: Performed by: UROLOGY

## 2021-12-13 PROCEDURE — C1889 IMPLANT/INSERT DEVICE, NOC: HCPCS | Performed by: UROLOGY

## 2021-12-13 PROCEDURE — A9270 NON-COVERED ITEM OR SERVICE: HCPCS | Performed by: INTERNAL MEDICINE

## 2021-12-13 PROCEDURE — 63710000001 OXYCODONE 5 MG TABLET: Performed by: NURSE ANESTHETIST, CERTIFIED REGISTERED

## 2021-12-13 PROCEDURE — 63710000001 MONTELUKAST 10 MG TABLET: Performed by: UROLOGY

## 2021-12-13 PROCEDURE — 25010000002 GENTAMICIN PER 80 MG: Performed by: UROLOGY

## 2021-12-13 PROCEDURE — 54405 INSERT MULTI-COMP PENIS PROS: CPT | Performed by: UROLOGY

## 2021-12-13 PROCEDURE — 25010000002 FENTANYL CITRATE (PF) 50 MCG/ML SOLUTION: Performed by: NURSE ANESTHETIST, CERTIFIED REGISTERED

## 2021-12-13 PROCEDURE — 63710000001 GABAPENTIN 300 MG CAPSULE: Performed by: UROLOGY

## 2021-12-13 PROCEDURE — 63710000001 ASPIRIN 81 MG TABLET DELAYED-RELEASE: Performed by: UROLOGY

## 2021-12-13 PROCEDURE — 63710000001 INSULIN DETEMIR PER 5 UNITS: Performed by: INTERNAL MEDICINE

## 2021-12-13 PROCEDURE — 25010000002 VANCOMYCIN 5 G RECONSTITUTED SOLUTION: Performed by: UROLOGY

## 2021-12-13 PROCEDURE — 63710000001 HYDROCODONE-ACETAMINOPHEN 5-325 MG TABLET: Performed by: UROLOGY

## 2021-12-13 PROCEDURE — C1813 PROSTHESIS, PENILE, INFLATAB: HCPCS | Performed by: UROLOGY

## 2021-12-13 PROCEDURE — 25010000002 ROPIVACAINE PER 1 MG: Performed by: UROLOGY

## 2021-12-13 PROCEDURE — 82962 GLUCOSE BLOOD TEST: CPT

## 2021-12-13 DEVICE — ASSEMBLY KIT
Type: IMPLANTABLE DEVICE | Site: PENIS | Status: FUNCTIONAL
Brand: TITAN

## 2021-12-13 DEVICE — TITAN® TOUCH SCROTAL ZERO DEGREE ANGLE CYLINDER SET WITH PUMP
Type: IMPLANTABLE DEVICE | Site: PENIS | Status: FUNCTIONAL
Brand: TITAN

## 2021-12-13 DEVICE — CL RESERVOIR
Type: IMPLANTABLE DEVICE | Site: PENIS | Status: FUNCTIONAL
Brand: TITAN

## 2021-12-13 RX ORDER — FLUTICASONE PROPIONATE 50 MCG
1 SPRAY, SUSPENSION (ML) NASAL DAILY
Status: DISCONTINUED | OUTPATIENT
Start: 2021-12-13 | End: 2021-12-14 | Stop reason: HOSPADM

## 2021-12-13 RX ORDER — ONDANSETRON 2 MG/ML
INJECTION INTRAMUSCULAR; INTRAVENOUS AS NEEDED
Status: DISCONTINUED | OUTPATIENT
Start: 2021-12-13 | End: 2021-12-13 | Stop reason: SURG

## 2021-12-13 RX ORDER — ACETAMINOPHEN 500 MG
1000 TABLET ORAL ONCE
Status: COMPLETED | OUTPATIENT
Start: 2021-12-13 | End: 2021-12-13

## 2021-12-13 RX ORDER — ONDANSETRON 2 MG/ML
4 INJECTION INTRAMUSCULAR; INTRAVENOUS EVERY 6 HOURS PRN
Status: DISCONTINUED | OUTPATIENT
Start: 2021-12-13 | End: 2021-12-14 | Stop reason: HOSPADM

## 2021-12-13 RX ORDER — MELOXICAM 15 MG/1
7.5-15 TABLET ORAL DAILY PRN
COMMUNITY
End: 2022-07-18

## 2021-12-13 RX ORDER — HYDROCODONE BITARTRATE AND ACETAMINOPHEN 10; 325 MG/1; MG/1
1 TABLET ORAL EVERY 4 HOURS PRN
Status: DISCONTINUED | OUTPATIENT
Start: 2021-12-13 | End: 2021-12-14 | Stop reason: HOSPADM

## 2021-12-13 RX ORDER — TRAMADOL HYDROCHLORIDE 50 MG/1
25 TABLET ORAL NIGHTLY
Status: DISCONTINUED | OUTPATIENT
Start: 2021-12-13 | End: 2021-12-14 | Stop reason: HOSPADM

## 2021-12-13 RX ORDER — CETIRIZINE HYDROCHLORIDE 10 MG/1
10 TABLET ORAL DAILY
Status: DISCONTINUED | OUTPATIENT
Start: 2021-12-13 | End: 2021-12-14 | Stop reason: HOSPADM

## 2021-12-13 RX ORDER — ONDANSETRON 2 MG/ML
4 INJECTION INTRAMUSCULAR; INTRAVENOUS ONCE AS NEEDED
Status: DISCONTINUED | OUTPATIENT
Start: 2021-12-13 | End: 2021-12-13 | Stop reason: HOSPADM

## 2021-12-13 RX ORDER — VANCOMYCIN HYDROCHLORIDE 1 G/20ML
INJECTION, POWDER, LYOPHILIZED, FOR SOLUTION INTRAVENOUS AS NEEDED
Status: DISCONTINUED | OUTPATIENT
Start: 2021-12-13 | End: 2021-12-13 | Stop reason: HOSPADM

## 2021-12-13 RX ORDER — ACETAMINOPHEN 650 MG/1
650 SUPPOSITORY RECTAL EVERY 4 HOURS PRN
Status: DISCONTINUED | OUTPATIENT
Start: 2021-12-13 | End: 2021-12-14 | Stop reason: HOSPADM

## 2021-12-13 RX ORDER — OXYCODONE HYDROCHLORIDE 5 MG/1
5 TABLET ORAL
Status: COMPLETED | OUTPATIENT
Start: 2021-12-13 | End: 2021-12-13

## 2021-12-13 RX ORDER — GENTAMICIN SULFATE 40 MG/ML
INJECTION, SOLUTION INTRAMUSCULAR; INTRAVENOUS AS NEEDED
Status: DISCONTINUED | OUTPATIENT
Start: 2021-12-13 | End: 2021-12-13 | Stop reason: HOSPADM

## 2021-12-13 RX ORDER — OXYCODONE AND ACETAMINOPHEN 10; 325 MG/1; MG/1
1 TABLET ORAL EVERY 4 HOURS PRN
Status: DISCONTINUED | OUTPATIENT
Start: 2021-12-13 | End: 2021-12-14 | Stop reason: HOSPADM

## 2021-12-13 RX ORDER — ROCURONIUM BROMIDE 10 MG/ML
INJECTION, SOLUTION INTRAVENOUS AS NEEDED
Status: DISCONTINUED | OUTPATIENT
Start: 2021-12-13 | End: 2021-12-13 | Stop reason: SURG

## 2021-12-13 RX ORDER — PHENYLEPHRINE HCL IN 0.9% NACL 1 MG/10 ML
SYRINGE (ML) INTRAVENOUS AS NEEDED
Status: DISCONTINUED | OUTPATIENT
Start: 2021-12-13 | End: 2021-12-13 | Stop reason: SURG

## 2021-12-13 RX ORDER — NALOXONE HCL 0.4 MG/ML
0.1 VIAL (ML) INJECTION
Status: DISCONTINUED | OUTPATIENT
Start: 2021-12-13 | End: 2021-12-14 | Stop reason: HOSPADM

## 2021-12-13 RX ORDER — SODIUM CHLORIDE, SODIUM LACTATE, POTASSIUM CHLORIDE, CALCIUM CHLORIDE 600; 310; 30; 20 MG/100ML; MG/100ML; MG/100ML; MG/100ML
100 INJECTION, SOLUTION INTRAVENOUS CONTINUOUS
Status: DISCONTINUED | OUTPATIENT
Start: 2021-12-13 | End: 2021-12-14 | Stop reason: HOSPADM

## 2021-12-13 RX ORDER — SODIUM CHLORIDE 0.9 % (FLUSH) 0.9 %
10 SYRINGE (ML) INJECTION EVERY 12 HOURS SCHEDULED
Status: DISCONTINUED | OUTPATIENT
Start: 2021-12-13 | End: 2021-12-14 | Stop reason: HOSPADM

## 2021-12-13 RX ORDER — MEPERIDINE HYDROCHLORIDE 25 MG/ML
12.5 INJECTION INTRAMUSCULAR; INTRAVENOUS; SUBCUTANEOUS
Status: DISCONTINUED | OUTPATIENT
Start: 2021-12-13 | End: 2021-12-13 | Stop reason: HOSPADM

## 2021-12-13 RX ORDER — GLYCOPYRROLATE 0.2 MG/ML
0.2 INJECTION INTRAMUSCULAR; INTRAVENOUS
Status: COMPLETED | OUTPATIENT
Start: 2021-12-13 | End: 2021-12-13

## 2021-12-13 RX ORDER — ALLOPURINOL 300 MG/1
300 TABLET ORAL DAILY
Status: DISCONTINUED | OUTPATIENT
Start: 2021-12-14 | End: 2021-12-14 | Stop reason: HOSPADM

## 2021-12-13 RX ORDER — SODIUM CHLORIDE 0.9 % (FLUSH) 0.9 %
10 SYRINGE (ML) INJECTION AS NEEDED
Status: DISCONTINUED | OUTPATIENT
Start: 2021-12-13 | End: 2021-12-13 | Stop reason: HOSPADM

## 2021-12-13 RX ORDER — CARVEDILOL 3.12 MG/1
3.12 TABLET ORAL 2 TIMES DAILY WITH MEALS
Status: DISCONTINUED | OUTPATIENT
Start: 2021-12-13 | End: 2021-12-14 | Stop reason: HOSPADM

## 2021-12-13 RX ORDER — LIDOCAINE HYDROCHLORIDE 20 MG/ML
INJECTION, SOLUTION INFILTRATION; PERINEURAL AS NEEDED
Status: DISCONTINUED | OUTPATIENT
Start: 2021-12-13 | End: 2021-12-13 | Stop reason: SURG

## 2021-12-13 RX ORDER — ALBUTEROL SULFATE 2.5 MG/3ML
2.5 SOLUTION RESPIRATORY (INHALATION) EVERY 4 HOURS PRN
Status: DISCONTINUED | OUTPATIENT
Start: 2021-12-13 | End: 2021-12-14 | Stop reason: HOSPADM

## 2021-12-13 RX ORDER — FENTANYL CITRATE 50 UG/ML
INJECTION, SOLUTION INTRAMUSCULAR; INTRAVENOUS AS NEEDED
Status: DISCONTINUED | OUTPATIENT
Start: 2021-12-13 | End: 2021-12-13 | Stop reason: SURG

## 2021-12-13 RX ORDER — SODIUM CHLORIDE 0.9 % (FLUSH) 0.9 %
10 SYRINGE (ML) INJECTION EVERY 12 HOURS SCHEDULED
Status: DISCONTINUED | OUTPATIENT
Start: 2021-12-13 | End: 2021-12-13 | Stop reason: HOSPADM

## 2021-12-13 RX ORDER — MIDAZOLAM HYDROCHLORIDE 2 MG/2ML
2 INJECTION, SOLUTION INTRAMUSCULAR; INTRAVENOUS ONCE
Status: COMPLETED | OUTPATIENT
Start: 2021-12-13 | End: 2021-12-13

## 2021-12-13 RX ORDER — KETOROLAC TROMETHAMINE 15 MG/ML
15 INJECTION, SOLUTION INTRAMUSCULAR; INTRAVENOUS EVERY 6 HOURS PRN
Status: DISCONTINUED | OUTPATIENT
Start: 2021-12-13 | End: 2021-12-14 | Stop reason: HOSPADM

## 2021-12-13 RX ORDER — PROMETHAZINE HYDROCHLORIDE 12.5 MG/1
25 TABLET ORAL ONCE AS NEEDED
Status: DISCONTINUED | OUTPATIENT
Start: 2021-12-13 | End: 2021-12-13 | Stop reason: HOSPADM

## 2021-12-13 RX ORDER — ONDANSETRON 4 MG/1
4 TABLET, FILM COATED ORAL EVERY 6 HOURS PRN
Status: DISCONTINUED | OUTPATIENT
Start: 2021-12-13 | End: 2021-12-14 | Stop reason: HOSPADM

## 2021-12-13 RX ORDER — ROPIVACAINE HYDROCHLORIDE 2 MG/ML
INJECTION, SOLUTION EPIDURAL; INFILTRATION; PERINEURAL AS NEEDED
Status: DISCONTINUED | OUTPATIENT
Start: 2021-12-13 | End: 2021-12-13 | Stop reason: HOSPADM

## 2021-12-13 RX ORDER — PROPOFOL 10 MG/ML
VIAL (ML) INTRAVENOUS AS NEEDED
Status: DISCONTINUED | OUTPATIENT
Start: 2021-12-13 | End: 2021-12-13 | Stop reason: SURG

## 2021-12-13 RX ORDER — SODIUM CHLORIDE 0.9 % (FLUSH) 0.9 %
10 SYRINGE (ML) INJECTION AS NEEDED
Status: DISCONTINUED | OUTPATIENT
Start: 2021-12-13 | End: 2021-12-14 | Stop reason: HOSPADM

## 2021-12-13 RX ORDER — ACETAMINOPHEN 325 MG/1
650 TABLET ORAL EVERY 4 HOURS PRN
Status: DISCONTINUED | OUTPATIENT
Start: 2021-12-13 | End: 2021-12-14 | Stop reason: HOSPADM

## 2021-12-13 RX ORDER — SODIUM CHLORIDE 9 MG/ML
9 INJECTION, SOLUTION INTRAVENOUS CONTINUOUS PRN
Status: DISCONTINUED | OUTPATIENT
Start: 2021-12-13 | End: 2021-12-14 | Stop reason: HOSPADM

## 2021-12-13 RX ORDER — MAGNESIUM HYDROXIDE 1200 MG/15ML
LIQUID ORAL AS NEEDED
Status: DISCONTINUED | OUTPATIENT
Start: 2021-12-13 | End: 2021-12-13 | Stop reason: HOSPADM

## 2021-12-13 RX ORDER — ATORVASTATIN CALCIUM 20 MG/1
20 TABLET, FILM COATED ORAL NIGHTLY
Status: DISCONTINUED | OUTPATIENT
Start: 2021-12-13 | End: 2021-12-14 | Stop reason: HOSPADM

## 2021-12-13 RX ORDER — GINSENG 100 MG
CAPSULE ORAL AS NEEDED
Status: DISCONTINUED | OUTPATIENT
Start: 2021-12-13 | End: 2021-12-13 | Stop reason: HOSPADM

## 2021-12-13 RX ORDER — GABAPENTIN 300 MG/1
300 CAPSULE ORAL 2 TIMES DAILY
Status: DISCONTINUED | OUTPATIENT
Start: 2021-12-13 | End: 2021-12-14 | Stop reason: HOSPADM

## 2021-12-13 RX ORDER — HYDROCODONE BITARTRATE AND ACETAMINOPHEN 5; 325 MG/1; MG/1
1 TABLET ORAL EVERY 4 HOURS PRN
Status: DISCONTINUED | OUTPATIENT
Start: 2021-12-13 | End: 2021-12-13 | Stop reason: SDUPTHER

## 2021-12-13 RX ORDER — MONTELUKAST SODIUM 10 MG/1
10 TABLET ORAL DAILY
Status: DISCONTINUED | OUTPATIENT
Start: 2021-12-13 | End: 2021-12-14 | Stop reason: HOSPADM

## 2021-12-13 RX ORDER — ASPIRIN 81 MG/1
81 TABLET ORAL NIGHTLY
Status: DISCONTINUED | OUTPATIENT
Start: 2021-12-13 | End: 2021-12-14 | Stop reason: HOSPADM

## 2021-12-13 RX ORDER — SODIUM CHLORIDE 9 MG/ML
INJECTION INTRAVENOUS AS NEEDED
Status: DISCONTINUED | OUTPATIENT
Start: 2021-12-13 | End: 2021-12-13 | Stop reason: HOSPADM

## 2021-12-13 RX ORDER — PROMETHAZINE HYDROCHLORIDE 25 MG/1
25 SUPPOSITORY RECTAL ONCE AS NEEDED
Status: DISCONTINUED | OUTPATIENT
Start: 2021-12-13 | End: 2021-12-13 | Stop reason: HOSPADM

## 2021-12-13 RX ADMIN — INSULIN DETEMIR 30 UNITS: 100 INJECTION, SOLUTION SUBCUTANEOUS at 21:32

## 2021-12-13 RX ADMIN — SODIUM CHLORIDE, PRESERVATIVE FREE 10 ML: 5 INJECTION INTRAVENOUS at 21:14

## 2021-12-13 RX ADMIN — GLYCOPYRROLATE 0.2 MG: 0.2 INJECTION INTRAMUSCULAR; INTRAVENOUS at 07:17

## 2021-12-13 RX ADMIN — ROCURONIUM BROMIDE 30 MG: 10 INJECTION INTRAVENOUS at 09:10

## 2021-12-13 RX ADMIN — GENTAMICIN SULFATE 430 MG: 40 INJECTION, SOLUTION INTRAMUSCULAR; INTRAVENOUS at 21:14

## 2021-12-13 RX ADMIN — HYDROMORPHONE HYDROCHLORIDE 0.5 MG: 1 INJECTION, SOLUTION INTRAMUSCULAR; INTRAVENOUS; SUBCUTANEOUS at 15:48

## 2021-12-13 RX ADMIN — ASPIRIN 81 MG: 81 TABLET, COATED ORAL at 21:13

## 2021-12-13 RX ADMIN — HYDROMORPHONE HYDROCHLORIDE 0.5 MG: 1 INJECTION, SOLUTION INTRAMUSCULAR; INTRAVENOUS; SUBCUTANEOUS at 21:18

## 2021-12-13 RX ADMIN — HYDROMORPHONE HYDROCHLORIDE 0.5 MG: 1 INJECTION, SOLUTION INTRAMUSCULAR; INTRAVENOUS; SUBCUTANEOUS at 10:34

## 2021-12-13 RX ADMIN — MIDAZOLAM HYDROCHLORIDE 2 MG: 1 INJECTION, SOLUTION INTRAMUSCULAR; INTRAVENOUS at 07:18

## 2021-12-13 RX ADMIN — SUGAMMADEX 200 MG: 100 INJECTION, SOLUTION INTRAVENOUS at 09:48

## 2021-12-13 RX ADMIN — FENTANYL CITRATE 50 MCG: 50 INJECTION, SOLUTION INTRAMUSCULAR; INTRAVENOUS at 08:10

## 2021-12-13 RX ADMIN — OXYCODONE HYDROCHLORIDE AND ACETAMINOPHEN 1 TABLET: 10; 325 TABLET ORAL at 17:46

## 2021-12-13 RX ADMIN — HYDROMORPHONE HYDROCHLORIDE 0.5 MG: 1 INJECTION, SOLUTION INTRAMUSCULAR; INTRAVENOUS; SUBCUTANEOUS at 10:22

## 2021-12-13 RX ADMIN — OXYCODONE HYDROCHLORIDE 5 MG: 5 TABLET ORAL at 11:13

## 2021-12-13 RX ADMIN — FENTANYL CITRATE 50 MCG: 50 INJECTION, SOLUTION INTRAMUSCULAR; INTRAVENOUS at 07:32

## 2021-12-13 RX ADMIN — SODIUM CHLORIDE, POTASSIUM CHLORIDE, SODIUM LACTATE AND CALCIUM CHLORIDE 100 ML/HR: 600; 310; 30; 20 INJECTION, SOLUTION INTRAVENOUS at 17:39

## 2021-12-13 RX ADMIN — HYDROMORPHONE HYDROCHLORIDE 0.5 MG: 1 INJECTION, SOLUTION INTRAMUSCULAR; INTRAVENOUS; SUBCUTANEOUS at 12:13

## 2021-12-13 RX ADMIN — KETOROLAC TROMETHAMINE 15 MG: 15 INJECTION, SOLUTION INTRAMUSCULAR; INTRAVENOUS at 12:13

## 2021-12-13 RX ADMIN — PROPOFOL 20 MG: 10 INJECTION, EMULSION INTRAVENOUS at 09:49

## 2021-12-13 RX ADMIN — HYDROCODONE BITARTRATE AND ACETAMINOPHEN 1 TABLET: 5; 325 TABLET ORAL at 15:24

## 2021-12-13 RX ADMIN — MONTELUKAST 10 MG: 10 TABLET, FILM COATED ORAL at 12:13

## 2021-12-13 RX ADMIN — FLUTICASONE PROPIONATE 1 SPRAY: 50 SPRAY, METERED NASAL at 12:13

## 2021-12-13 RX ADMIN — SODIUM CHLORIDE 9 ML/HR: 9 INJECTION, SOLUTION INTRAVENOUS at 06:54

## 2021-12-13 RX ADMIN — OXYCODONE HYDROCHLORIDE 5 MG: 5 TABLET ORAL at 10:42

## 2021-12-13 RX ADMIN — MUPIROCIN: 20 OINTMENT TOPICAL at 12:13

## 2021-12-13 RX ADMIN — ACETAMINOPHEN 1000 MG: 500 TABLET ORAL at 06:54

## 2021-12-13 RX ADMIN — ATORVASTATIN CALCIUM 20 MG: 20 TABLET, FILM COATED ORAL at 21:13

## 2021-12-13 RX ADMIN — Medication 100 MCG: at 08:51

## 2021-12-13 RX ADMIN — ROCURONIUM BROMIDE 20 MG: 10 INJECTION INTRAVENOUS at 07:48

## 2021-12-13 RX ADMIN — VANCOMYCIN HYDROCHLORIDE 1750 MG: 5 INJECTION, POWDER, LYOPHILIZED, FOR SOLUTION INTRAVENOUS at 07:18

## 2021-12-13 RX ADMIN — TRAMADOL HYDROCHLORIDE 25 MG: 50 TABLET ORAL at 21:13

## 2021-12-13 RX ADMIN — ROCURONIUM BROMIDE 50 MG: 10 INJECTION INTRAVENOUS at 07:32

## 2021-12-13 RX ADMIN — MUPIROCIN: 20 OINTMENT TOPICAL at 21:14

## 2021-12-13 RX ADMIN — CETIRIZINE HYDROCHLORIDE 10 MG: 10 TABLET, FILM COATED ORAL at 12:13

## 2021-12-13 RX ADMIN — Medication 100 MCG: at 08:39

## 2021-12-13 RX ADMIN — CARVEDILOL 3.12 MG: 3.12 TABLET, FILM COATED ORAL at 17:46

## 2021-12-13 RX ADMIN — ONDANSETRON 4 MG: 2 INJECTION INTRAMUSCULAR; INTRAVENOUS at 09:37

## 2021-12-13 RX ADMIN — HYDROCODONE BITARTRATE AND ACETAMINOPHEN 1 TABLET: 10; 325 TABLET ORAL at 22:10

## 2021-12-13 RX ADMIN — SUGAMMADEX 200 MG: 100 INJECTION, SOLUTION INTRAVENOUS at 09:51

## 2021-12-13 RX ADMIN — ROCURONIUM BROMIDE 20 MG: 10 INJECTION INTRAVENOUS at 08:25

## 2021-12-13 RX ADMIN — PROPOFOL 150 MG: 10 INJECTION, EMULSION INTRAVENOUS at 07:32

## 2021-12-13 RX ADMIN — GABAPENTIN 300 MG: 300 CAPSULE ORAL at 21:13

## 2021-12-13 RX ADMIN — Medication 100 MCG: at 07:49

## 2021-12-13 RX ADMIN — LIDOCAINE HYDROCHLORIDE 100 MG: 20 INJECTION, SOLUTION INFILTRATION; PERINEURAL at 07:32

## 2021-12-13 NOTE — ANESTHESIA POSTPROCEDURE EVALUATION
Patient: Jean-Pierre Anaya    Procedure Summary     Date: 12/13/21 Room / Location: [facility] OR 02 / [facility] MAIN OR    Anesthesia Start: 0728 Anesthesia Stop: 0958    Procedure: PENILE PROSTHESIS PLACEMENT (Bilateral Penis) Diagnosis:       Impotence due to erectile dysfunction      (Impotence due to erectile dysfunction [N52.9])    Surgeons: Serafin Pereira MD Provider: Toan Cross MD    Anesthesia Type: general ASA Status: 3          Anesthesia Type: general    Vitals  Vitals Value Taken Time   /69 12/13/21 1029   Temp 36.6 °C (97.8 °F) 12/13/21 0959   Pulse 77 12/13/21 1031   Resp 16 12/13/21 1004   SpO2 98 % 12/13/21 1031   Vitals shown include unvalidated device data.        Post Anesthesia Care and Evaluation    Patient location during evaluation: bedside  Patient participation: complete - patient participated  Level of consciousness: awake  Pain management: adequate  Airway patency: patent  Anesthetic complications: No anesthetic complications  PONV Status: none  Cardiovascular status: acceptable  Respiratory status: acceptable  Hydration status: acceptable    Comments: An Anesthesiologist personally participated in the most demanding procedures (including induction and emergence if applicable) in the anesthesia plan, monitored the course of anesthesia administration at frequent intervals and remained physically present and available for immediate diagnosis and treatment of emergencies.

## 2021-12-13 NOTE — OP NOTE
PENILE PROSTHESIS PLACEMENT  Procedure Report    Patient Name:  Jean-Pierre Anaya  YOB: 1955    Date of Surgery:  12/13/2021     Indications: Rectal dysfunction    Diabetes mellitus    Pre-op Diagnosis:   Impotence due to erectile dysfunction [N52.9]       Post-Op Diagnosis Codes:     * Impotence due to erectile dysfunction [N52.9]           Procedure(s):  PENILE PROSTHESIS PLACEMENT    Staff:  Surgeon(s):  Serafin Pereira MD    Assistant: Ifeoma Maria    Anesthesia: General    Estimated Blood Loss: 100 mL    Implants:    Implant Name Type Inv. Item Serial No.  Lot No. LRB No. Used Action   KT INSRT PENILE TITAN ASMBL STD W/IMP - [id number] Implant KT INSRT PENILE TITAN ASMBL STD W/IMP  [employer]OPLAST MANUEL [id number]  1 Implanted   PRSTH PENILE TITANTOUCH PENOSCROTAL 3PC W/PUMP HC 18MM - [id number] Implant PRSTH PENILE TITANTOUCH PENOSCROTAL 3PC W/PUMP HC 18MM  [employer]OPLAST MANUEL [id number]  1 Implanted   RESVR PRSTH PENILE TITAN CLVRLF INFL 75CC - [id number] Implant RESVR PRSTH PENILE TITAN CLVRLF INFL 75CC  [employer]OPLAST MANUEL [id number]  1 Implanted       Specimen:          None        Findings: Rectal dysfunction    Complications: No complications    Description of Procedure: Patient was placed in the supine position and thorough scrubbing of lower abdomen external genitalia was performed.  16 Nunez catheter was inserted in the urinary bladder balloon was inflated to 10 mL.  Bladder was drained and a catheter plug was applied.  Samm retractor was applied.  We used sharp hook at the meatus and attached it to Samm retractor to make the penis on the stretch.  Incision was made 1 cm below the penoscrotal junction skin incision was deepened.  Urethra was identified and we opened up the Butler's fascia.  Left corpus cavernosus were identified and I took 2-0 Vicryl sutures and then 1.5 cm incision was made in the left corpus cavernosum.  Same thing was done on the right side using 2-0 Vicryl as  stay sutures.  I used Metzenbaum and then cleared all the adhesions on the lateral side of corpus cavernosum on both sides.  We went ahead with dilatation of the corpus cavernosum using Morrison dilators and dilated the distal corpus cavernosum to 13 and 14 proximally.  We went ahead and incised the corpus cavernosum and seem to be 19 on both sides.  Distal was 8 and proximal was 11.  We selected 18 implant and attached 1 cm rear-tip extender.    I went ahead with sharp dissection medial to the spermatic cord just above the pubic bone and using my finger we created a space in the space of Retzius.  We used inserted 75 mL reservoir making sure the flat part is away from the bone.  We inflated the reservoir to 65 mL.    Now we went ahead and inserted the cylinders first on the patient left and then the right and then we preplaced sutures and they were tied.  I had to put a few stitches on the patient right because I could not completely close the corpus cavernosum on the right.  I went ahead with insertion of the pump in the posterior part of the scrotum and then we cut the tubing between the reservoir and pump they were attached with a quick contacts.  We tested penile implant 3 times and it was working fine.  We went ahead and took those sutures out which were attaching to the implant through the glans penis.    Butler's fascia was closed with 2-0 Vicryl.  Subcutaneous tissue was closed with 2-0 Vicryl.  Skin was closed with 2-0 chromic in a ventilator with application of Dermabond.  1% Xylocaine and 0.2 ropivacaine was injected in the skin.  Patient tolerated procedure well sent to recovery room in good condition.    Assistant: Ifeoma Maria  was responsible for performing the following activities: Retraction, Suction, Irrigation, Suturing, Closing and Placing Dressing and their skilled assistance was necessary for the success of this case.    Serafin Pereira MD     Date: 12/13/2021  Time: 10:24 EST

## 2021-12-13 NOTE — ANESTHESIA PREPROCEDURE EVALUATION
Anesthesia Evaluation     Patient summary reviewed and Nursing notes reviewed   no history of anesthetic complications:  NPO Solid Status: > 8 hours  NPO Liquid Status: > 2 hours           Airway   Mallampati: III  TM distance: >3 FB  Neck ROM: full  Possible difficult intubation  Dental - normal exam     Pulmonary - normal exam   (+) asthma (well-controlled),  Cardiovascular - normal exam  Exercise tolerance: good (4-7 METS)    ECG reviewed    (+) hypertension, hyperlipidemia,     ROS comment: 7/8/20 cardiac cath:  CONCLUSIONS:    Normal coronaries.       Neuro/Psych  (+) numbness,     GI/Hepatic/Renal/Endo    (+) obesity, morbid obesity,  renal disease CRI and stones, diabetes mellitus type 2,     Musculoskeletal     Abdominal   (+) obese,    Substance History - negative use     OB/GYN negative ob/gyn ROS         Other   arthritis,                      Anesthesia Plan    ASA 3     general   (Patient understands anesthesia not responsible for dental damage.)  intravenous induction     Anesthetic plan, all risks, benefits, and alternatives have been provided, discussed and informed consent has been obtained with: patient.    Plan discussed with CRNA.

## 2021-12-13 NOTE — CONSULTS
[facility]   Consult Note    Patient Name: Jean-Pierre Anaya  : 1955  MRN: [id number]  Primary Care Physician:  Ian Wilson MD  Referring Physician: Serafin Pereira MD  Date of admission: 2021    Subjective   Subjective     Reason for Consult/ Chief Complaint:   Diabetic management  HPI:  Jean-Pierre Anaya is a 66 y.o. male admitted electively for penile implant surgery.  Patient post surgery awake and alert.  I have been asked to manage his diabetes and medical condition.  Patient denies any chest pain, nausea, vomiting or shortness of breath.  Sugars are stable  Review of Systems   No fever chills.  No shortness of breath.  Denies any chest pain.  No complaints of nausea or vomiting    Personal History     Past Medical History:   Diagnosis Date   • Abnormal EKG    • Arthritis    • Asthma    • Bacteriuria 2021   • Benign essential hypertension    • Bilateral carpal tunnel syndrome 2019   • BPH (benign prostatic hyperplasia)    • Chronic kidney disease     stage 3   • Diabetes (HCC)    • Diabetes mellitus type 2, noninsulin dependent (HCC)    • ED (erectile dysfunction)    • HLD (hyperlipidemia)    • HTN (hypertension)    • Hyperlipemia    • Hypogonadism in male    • Leg swelling    • Neuropathy    • Preop examination 2019    BILATERAL CTS WANTS TO HAVE RIGHT CTR BEFORE LEFT   • Prostate disorder    • Renal calculus or stone    • Seasonal allergies        Past Surgical History:   Procedure Laterality Date   • CARDIAC CATHETERIZATION     • CARPAL TUNNEL RELEASE Right 2019    CTR   • CATARACT EXTRACTION, BILATERAL  2021   • COLONOSCOPY     • CYSTOSCOPY     • EYE SURGERY      EYE IMPLANT   • KIDNEY STONE SURGERY     • PROSTATE LASER ABLATION/ENUCLEATION     • TUMOR REMOVAL      fatty tumor- from back   • TURP / TRANSURETHRAL INCISION / DRAINAGE PROSTATE  2017       Family History: family history includes Arthritis in his mother; Breast cancer in his  mother; Kidney cancer in his father; Lung cancer in his father; Osteoporosis in his mother. Otherwise pertinent FHx was reviewed and not pertinent to current issue.    Social History:  reports that he has never smoked. He has never used smokeless tobacco. He reports that he does not drink alcohol and does not use drugs.    Home Medications:  Azelastine HCl, Cholecalciferol, Diclofenac Sodium, Insulin Glargine (1 Unit Dial), Omega-3, Semaglutide(0.25 or 0.5MG/DOS), albuterol sulfate HFA, allopurinol, aspirin, atorvastatin, carvedilol, fenofibrate, fexofenadine, fluticasone, gabapentin, insulin lispro, meloxicam, montelukast, potassium citrate, and traMADol    Allergies:  No Known Allergies    Objective    Objective     Vitals:   Temp:  [97.2 °F (36.2 °C)-98.4 °F (36.9 °C)] 98.4 °F (36.9 °C)  Heart Rate:  [75-85] 85  Resp:  [16-18] 18  BP: (125-148)/(57-73) 138/60  Flow (L/min):  [2-95] 2    Physical Exam:   Constitutional: Awake, alert   Eyes: PERRLA, sclerae anicteric, no conjunctival injection   HENT: NCAT, mucous membranes moist   Neck: Supple, no thyromegaly, no lymphadenopathy, trachea midline   Respiratory: Clear to auscultation bilaterally, nonlabored respirations    Cardiovascular: RRR, no murmurs, rubs, or gallops, palpable pedal pulses bilaterally   Gastrointestinal: Positive bowel sounds, soft, nontender, nondistended   Musculoskeletal: No bilateral ankle edema, no clubbing or cyanosis to extremities   Psychiatric: Appropriate affect, cooperative   Neurologic: Oriented x 3, generalized weakness   skin: No rashes     Result Review    Result Review:  I have personally reviewed the results from the time of this admission to 12/13/2021 18:58 EST and agree with these findings:  [x]  Laboratory  []  Microbiology  []  Radiology  []  EKG/Telemetry   []  Cardiology/Vascular   []  Pathology  []  Old records  []  Other:  Most notable findings include:   Blood sugar stable    Assessment/Plan   Assessment / Plan      Brief Patient Summary:  Jean-Pierre Anaya is a 66 y.o. male admitted electively post penile implant surgery.  Stable  Active Hospital Problems:  Active Hospital Problems    Diagnosis    • **Impotence due to erectile dysfunction      Added automatically from request for surgery [id number]     • Type 2 diabetes mellitus (HCC)      Plan/Recommendations:.  Patient stable post surgery.  Resume essential home meds.  Monitor sugars and use sliding scale.  I will follow with you.  Thank you for this consult      .      Electronically signed by Ian Wilson MD, 12/13/21, 6:58 PM EST.

## 2021-12-14 VITALS
RESPIRATION RATE: 18 BRPM | OXYGEN SATURATION: 92 % | HEART RATE: 85 BPM | SYSTOLIC BLOOD PRESSURE: 139 MMHG | TEMPERATURE: 99 F | WEIGHT: 240.3 LBS | DIASTOLIC BLOOD PRESSURE: 59 MMHG | HEIGHT: 69 IN | BODY MASS INDEX: 35.59 KG/M2

## 2021-12-14 LAB
ALBUMIN SERPL-MCNC: 3.4 G/DL (ref 3.5–5.2)
ALBUMIN/GLOB SERPL: 1.8 G/DL
ALP SERPL-CCNC: 62 U/L (ref 39–117)
ALT SERPL W P-5'-P-CCNC: 24 U/L (ref 1–41)
ANION GAP SERPL CALCULATED.3IONS-SCNC: 9.7 MMOL/L (ref 5–15)
AST SERPL-CCNC: 20 U/L (ref 1–40)
BASOPHILS # BLD AUTO: 0.02 10*3/MM3 (ref 0–0.2)
BASOPHILS NFR BLD AUTO: 0.2 % (ref 0–1.5)
BILIRUB SERPL-MCNC: 0.6 MG/DL (ref 0–1.2)
BUN SERPL-MCNC: 22 MG/DL (ref 8–23)
BUN/CREAT SERPL: 12.9 (ref 7–25)
CALCIUM SPEC-SCNC: 8.3 MG/DL (ref 8.6–10.5)
CHLORIDE SERPL-SCNC: 106 MMOL/L (ref 98–107)
CO2 SERPL-SCNC: 22.3 MMOL/L (ref 22–29)
CREAT SERPL-MCNC: 1.7 MG/DL (ref 0.76–1.27)
DEPRECATED RDW RBC AUTO: 39.3 FL (ref 37–54)
EOSINOPHIL # BLD AUTO: 0.18 10*3/MM3 (ref 0–0.4)
EOSINOPHIL NFR BLD AUTO: 1.5 % (ref 0.3–6.2)
ERYTHROCYTE [DISTWIDTH] IN BLOOD BY AUTOMATED COUNT: 12.2 % (ref 12.3–15.4)
GENTAMICIN SERPL-MCNC: 3.69 MCG/ML (ref 0.5–10)
GFR SERPL CREATININE-BSD FRML MDRD: 40 ML/MIN/1.73
GLOBULIN UR ELPH-MCNC: 1.9 GM/DL
GLUCOSE BLDC GLUCOMTR-MCNC: 114 MG/DL (ref 70–99)
GLUCOSE BLDC GLUCOMTR-MCNC: 152 MG/DL (ref 70–99)
GLUCOSE SERPL-MCNC: 155 MG/DL (ref 65–99)
HCT VFR BLD AUTO: 38.9 % (ref 37.5–51)
HGB BLD-MCNC: 13.6 G/DL (ref 13–17.7)
IMM GRANULOCYTES # BLD AUTO: 0.04 10*3/MM3 (ref 0–0.05)
IMM GRANULOCYTES NFR BLD AUTO: 0.3 % (ref 0–0.5)
LYMPHOCYTES # BLD AUTO: 1.22 10*3/MM3 (ref 0.7–3.1)
LYMPHOCYTES NFR BLD AUTO: 10.5 % (ref 19.6–45.3)
MCH RBC QN AUTO: 30.5 PG (ref 26.6–33)
MCHC RBC AUTO-ENTMCNC: 35 G/DL (ref 31.5–35.7)
MCV RBC AUTO: 87.2 FL (ref 79–97)
MONOCYTES # BLD AUTO: 1.08 10*3/MM3 (ref 0.1–0.9)
MONOCYTES NFR BLD AUTO: 9.3 % (ref 5–12)
NEUTROPHILS NFR BLD AUTO: 78.2 % (ref 42.7–76)
NEUTROPHILS NFR BLD AUTO: 9.11 10*3/MM3 (ref 1.7–7)
NRBC BLD AUTO-RTO: 0 /100 WBC (ref 0–0.2)
PLATELET # BLD AUTO: 192 10*3/MM3 (ref 140–450)
PMV BLD AUTO: 11.4 FL (ref 6–12)
POTASSIUM SERPL-SCNC: 4.3 MMOL/L (ref 3.5–5.2)
PROT SERPL-MCNC: 5.3 G/DL (ref 6–8.5)
RBC # BLD AUTO: 4.46 10*6/MM3 (ref 4.14–5.8)
SODIUM SERPL-SCNC: 138 MMOL/L (ref 136–145)
WBC NRBC COR # BLD: 11.65 10*3/MM3 (ref 3.4–10.8)

## 2021-12-14 PROCEDURE — 82962 GLUCOSE BLOOD TEST: CPT

## 2021-12-14 PROCEDURE — 85025 COMPLETE CBC W/AUTO DIFF WBC: CPT | Performed by: UROLOGY

## 2021-12-14 PROCEDURE — A9270 NON-COVERED ITEM OR SERVICE: HCPCS | Performed by: UROLOGY

## 2021-12-14 PROCEDURE — 25010000002 VANCOMYCIN 5 G RECONSTITUTED SOLUTION: Performed by: UROLOGY

## 2021-12-14 PROCEDURE — 63710000001 INSULIN LISPRO (HUMAN) PER 5 UNITS: Performed by: UROLOGY

## 2021-12-14 PROCEDURE — 25010000002 HYDROMORPHONE 1 MG/ML SOLUTION: Performed by: UROLOGY

## 2021-12-14 PROCEDURE — 80170 ASSAY OF GENTAMICIN: CPT | Performed by: UROLOGY

## 2021-12-14 PROCEDURE — 94799 UNLISTED PULMONARY SVC/PX: CPT

## 2021-12-14 PROCEDURE — 63710000001 OXYCODONE-ACETAMINOPHEN 10-325 MG TABLET: Performed by: UROLOGY

## 2021-12-14 PROCEDURE — 63710000001 ALLOPURINOL 300 MG TABLET: Performed by: UROLOGY

## 2021-12-14 PROCEDURE — 63710000001 GABAPENTIN 300 MG CAPSULE: Performed by: UROLOGY

## 2021-12-14 PROCEDURE — 25010000002 CEFTRIAXONE PER 250 MG: Performed by: UROLOGY

## 2021-12-14 PROCEDURE — 63710000001 CARVEDILOL 3.125 MG TABLET: Performed by: UROLOGY

## 2021-12-14 PROCEDURE — 63710000001 MONTELUKAST 10 MG TABLET: Performed by: UROLOGY

## 2021-12-14 PROCEDURE — 63710000001 HYDROCODONE-ACETAMINOPHEN 10-325 MG TABLET: Performed by: UROLOGY

## 2021-12-14 PROCEDURE — 63710000001 CETIRIZINE 10 MG TABLET: Performed by: UROLOGY

## 2021-12-14 PROCEDURE — 80053 COMPREHEN METABOLIC PANEL: CPT | Performed by: UROLOGY

## 2021-12-14 PROCEDURE — 94761 N-INVAS EAR/PLS OXIMETRY MLT: CPT

## 2021-12-14 PROCEDURE — 25010000002 KETOROLAC TROMETHAMINE PER 15 MG: Performed by: UROLOGY

## 2021-12-14 RX ORDER — CEPHALEXIN 250 MG/1
250 CAPSULE ORAL 4 TIMES DAILY
Qty: 40 CAPSULE | Refills: 0 | Status: SHIPPED | OUTPATIENT
Start: 2021-12-14 | End: 2021-12-24

## 2021-12-14 RX ORDER — HEPARIN SODIUM (PORCINE) LOCK FLUSH IV SOLN 100 UNIT/ML 100 UNIT/ML
100 SOLUTION INTRAVENOUS ONCE
Status: DISCONTINUED | OUTPATIENT
Start: 2021-12-14 | End: 2021-12-14 | Stop reason: HOSPADM

## 2021-12-14 RX ORDER — OXYCODONE AND ACETAMINOPHEN 10; 325 MG/1; MG/1
1 TABLET ORAL EVERY 6 HOURS PRN
Qty: 20 TABLET | Refills: 0 | Status: SHIPPED | OUTPATIENT
Start: 2021-12-14 | End: 2022-01-31

## 2021-12-14 RX ORDER — CEFTRIAXONE SODIUM 1 G/50ML
1 INJECTION, SOLUTION INTRAVENOUS EVERY 24 HOURS
Status: DISCONTINUED | OUTPATIENT
Start: 2021-12-14 | End: 2021-12-14 | Stop reason: HOSPADM

## 2021-12-14 RX ADMIN — HYDROMORPHONE HYDROCHLORIDE 0.5 MG: 1 INJECTION, SOLUTION INTRAMUSCULAR; INTRAVENOUS; SUBCUTANEOUS at 05:01

## 2021-12-14 RX ADMIN — KETOROLAC TROMETHAMINE 15 MG: 15 INJECTION, SOLUTION INTRAMUSCULAR; INTRAVENOUS at 07:20

## 2021-12-14 RX ADMIN — SODIUM CHLORIDE, POTASSIUM CHLORIDE, SODIUM LACTATE AND CALCIUM CHLORIDE 100 ML/HR: 600; 310; 30; 20 INJECTION, SOLUTION INTRAVENOUS at 05:01

## 2021-12-14 RX ADMIN — INSULIN LISPRO 25 UNITS: 100 INJECTION, SOLUTION INTRAVENOUS; SUBCUTANEOUS at 07:50

## 2021-12-14 RX ADMIN — KETOROLAC TROMETHAMINE 15 MG: 15 INJECTION, SOLUTION INTRAMUSCULAR; INTRAVENOUS at 12:46

## 2021-12-14 RX ADMIN — INSULIN LISPRO 25 UNITS: 100 INJECTION, SOLUTION INTRAVENOUS; SUBCUTANEOUS at 12:46

## 2021-12-14 RX ADMIN — VANCOMYCIN HYDROCHLORIDE 1500 MG: 5 INJECTION, POWDER, LYOPHILIZED, FOR SOLUTION INTRAVENOUS at 07:21

## 2021-12-14 RX ADMIN — ALLOPURINOL 300 MG: 300 TABLET ORAL at 07:20

## 2021-12-14 RX ADMIN — MONTELUKAST 10 MG: 10 TABLET, FILM COATED ORAL at 07:20

## 2021-12-14 RX ADMIN — CARVEDILOL 3.12 MG: 3.12 TABLET, FILM COATED ORAL at 07:20

## 2021-12-14 RX ADMIN — GABAPENTIN 300 MG: 300 CAPSULE ORAL at 07:20

## 2021-12-14 RX ADMIN — FLUTICASONE PROPIONATE 1 SPRAY: 50 SPRAY, METERED NASAL at 07:21

## 2021-12-14 RX ADMIN — HYDROCODONE BITARTRATE AND ACETAMINOPHEN 1 TABLET: 10; 325 TABLET ORAL at 02:09

## 2021-12-14 RX ADMIN — OXYCODONE HYDROCHLORIDE AND ACETAMINOPHEN 1 TABLET: 10; 325 TABLET ORAL at 09:45

## 2021-12-14 RX ADMIN — CEFTRIAXONE SODIUM 1 G: 1 INJECTION, SOLUTION INTRAVENOUS at 09:45

## 2021-12-14 RX ADMIN — HYDROMORPHONE HYDROCHLORIDE 0.5 MG: 1 INJECTION, SOLUTION INTRAMUSCULAR; INTRAVENOUS; SUBCUTANEOUS at 07:20

## 2021-12-14 RX ADMIN — CETIRIZINE HYDROCHLORIDE 10 MG: 10 TABLET, FILM COATED ORAL at 07:20

## 2021-12-14 RX ADMIN — HYDROCODONE BITARTRATE AND ACETAMINOPHEN 1 TABLET: 10; 325 TABLET ORAL at 05:56

## 2021-12-14 RX ADMIN — HYDROMORPHONE HYDROCHLORIDE 0.5 MG: 1 INJECTION, SOLUTION INTRAMUSCULAR; INTRAVENOUS; SUBCUTANEOUS at 01:13

## 2021-12-14 NOTE — PLAN OF CARE
Goal Outcome Evaluation:           Progress: no change       Pt resting in bed, with multiple call outs for pain  medications throughout the night. Pain being controlled with dialudid and percocet. No c/o discomfort from shepard catheter. Possible dc today.

## 2021-12-14 NOTE — PLAN OF CARE
Goal Outcome Evaluation:         Pt pain is now controled with dilaudid and percocet. No c/o of nausea. Tolerating regular diet. SHERIF. NAD. Nunez patent.

## 2021-12-14 NOTE — DISCHARGE SUMMARY
Discharge summary    Date of Discharge:  12/14/2021    Discharge Diagnosis:   Post penile implant    Problem List:  Active Hospital Problems    Diagnosis  POA   • **Impotence due to erectile dysfunction [N52.9]  Yes   • Type 2 diabetes mellitus (HCC) [E11.9]  Yes      Resolved Hospital Problems   No resolved problems to display.       Presenting Problem/History of Present Illness  Impotence due to erectile dysfunction [N52.9]    Erectile dysfunction    Hospital Course  Patient is a 66 y.o. male presented with insertion of penile implant and is doing very well except pain in the penis.  On examination his incision is clean and there is not much swelling present in the scrotum.      Procedures Performed    Procedure(s):  PENILE PROSTHESIS PLACEMENT  -------------------       Consults:   Consults     Date and Time Order Name Status Description    12/13/2021 12:49 PM Inpatient Internal Medicine Consult                Condition on Discharge: Satisfactory    Vital Signs  Temp:  [97.2 °F (36.2 °C)-100 °F (37.8 °C)] 99.5 °F (37.5 °C)  Heart Rate:  [] 92  Resp:  [15-20] 18  BP: (125-150)/(57-73) 150/62    Discharge Disposition  Home or Self Care    Discharge Medications     Discharge Medications      New Medications      Instructions Start Date   cephalexin 250 MG capsule  Commonly known as: KEFLEX   250 mg, Oral, 4 Times Daily      oxyCODONE-acetaminophen  MG per tablet  Commonly known as: Percocet   1 tablet, Oral, Every 6 Hours PRN         Continue These Medications      Instructions Start Date   allopurinol 300 MG tablet  Commonly known as: ZYLOPRIM   300 mg, Oral, Daily      aspirin 81 MG EC tablet   81 mg, Oral, Nightly, Pt instructed to stop per Dr. Pereira, last dose taken 12/5      Azelastine HCl 137 MCG/SPRAY solution   1-2 sprays, Nasal, 2 Times Daily      carvedilol 3.125 MG tablet  Commonly known as: COREG   3.125 mg, Oral, 2 Times Daily With Meals      Cholecalciferol 50 MCG (2000 UT) tablet    2,000 Units, Oral, Daily      Diclofenac Sodium 1 % gel gel  Commonly known as: VOLTAREN   4 g, Topical, 4 Times Daily PRN      fenofibrate 160 MG tablet   160 mg, Oral, Nightly      fexofenadine 180 MG tablet  Commonly known as: ALLEGRA   180 mg, Oral, Nightly      fluticasone 50 MCG/ACT nasal spray  Commonly known as: FLONASE   1 spray, Nasal, Daily      gabapentin 300 MG capsule  Commonly known as: NEURONTIN   300 mg, Oral, 2 Times Daily      HumaLOG 100 UNIT/ML injection  Generic drug: insulin lispro   25 Units, Subcutaneous, 3 Times Daily Before Meals      Lipitor 20 MG tablet  Generic drug: atorvastatin   20 mg, Oral, Nightly      meloxicam 15 MG tablet  Commonly known as: MOBIC   7.5-15 mg, Oral, Daily PRN      montelukast 10 MG tablet  Commonly known as: SINGULAIR   10 mg, Oral, Daily      Omega-3 1000 MG capsule   2,000 mg, Oral, 2 Times Daily      Ozempic (0.25 or 0.5 MG/DOSE) 2 MG/1.5ML solution pen-injector  Generic drug: Semaglutide(0.25 or 0.5MG/DOS)   0.5 mg, Subcutaneous, Weekly, Takes on fridays      potassium citrate 10 MEQ (1080 MG) CR tablet  Commonly known as: UROCIT-K   20 mEq, Oral, 4 Times Daily With Meals & Nightly      ProAir  (90 Base) MCG/ACT inhaler  Generic drug: albuterol sulfate HFA   2 puffs, Inhalation, Every 4 Hours PRN      Toujeo SoloStar 300 UNIT/ML solution pen-injector injection  Generic drug: Insulin Glargine (1 Unit Dial)   70 Units, Subcutaneous, Nightly      traMADol 50 MG tablet  Commonly known as: ULTRAM   50 mg, Oral, 2 Times Daily PRN             Discharge Diet   Diet Instructions     Advance Diet As Tolerated            Activity at Discharge  Activity Instructions     Activity as Tolerated            Follow-up Appointments  Future Appointments   Date Time Provider Department Center   12/21/2021  2:00 PM Fior Dela Cruz OT [facility] [place] [facility]   12/21/2021  2:30 PM Tavon Velasco PT [facility] [place] [facility]   6/9/2022 11:30 AM Jose David Hernández MD [facility] [place] [facility]      Additional Instructions for the Follow-ups that You Need to Schedule     Call MD With Problems / Concerns   As directed      Instructions: Call if any concern    Order Comments: Instructions: Call if any concern          Discharge Follow-up with Specialty: Dr. Pereira or Ms. Lilly Jackson urology; 3 Weeks   As directed      Specialty: Dr. Pereira or Ms. Lilly Jackson urology    Follow Up: 3 Weeks               Test Results Pending at Discharge  Pending Labs     Order Current Status    Comprehensive Metabolic Panel In process    Gentamicin Level, Random In process                Signed:  Serafin Pereira MD  12/14/21  08:27 EST

## 2021-12-14 NOTE — PROGRESS NOTES
[facility]     Progress Note    Patient Name: Jean-Pierre Anaya  : 1955  MRN: [id number]  Primary Care Physician:  Ian Wilson MD  Date of admission: 2021      Subjective   Brief summary.  Patient admitted by surgical service for penile implant      HPI:  Status post surgery, awake alert today.  Tolerating food.  Follow-up on diabetes    Review of Systems     Chest pain or shortness of breath or abdominal pain.  No hypo or hyperglycemia      Objective     Vitals:   Temp:  [97.2 °F (36.2 °C)-100 °F (37.8 °C)] 99.5 °F (37.5 °C)  Heart Rate:  [] 92  Resp:  [15-20] 18  BP: (125-150)/(57-73) 150/62  Flow (L/min):  [2-95] 2    Physical Exam :     Elderly male not in acute distress.  Heart regular.  Lungs clear.  Abdomen soft      Result Review:  I have personally reviewed the results from the time of this admission to 2021 09:28 EST and agree with these findings:  [x]  Laboratory  []  Microbiology  []  Radiology  []  EKG/Telemetry   []  Cardiology/Vascular   []  Pathology  []  Old records  []  Other:           Assessment / Plan       Active Hospital Problems:  Active Hospital Problems    Diagnosis    • **Impotence due to erectile dysfunction      Added automatically from request for surgery [id number]     • Type 2 diabetes mellitus (HCC)        Plan:   Stable.    Patient advised to reduce dose of insulin and use sliding scale until full p.o. intake established at home.  Follow-up with me in office next week    DVT prophylaxis:  Mechanical DVT prophylaxis orders are present.    CODE STATUS:              Electronically signed by Ian Wilson MD, 21, 9:28 AM EST.

## 2021-12-20 ENCOUNTER — OFFICE VISIT (OUTPATIENT)
Dept: UROLOGY | Facility: CLINIC | Age: 66
End: 2021-12-20

## 2021-12-20 VITALS
TEMPERATURE: 96.7 F | HEART RATE: 87 BPM | BODY MASS INDEX: 35.55 KG/M2 | HEIGHT: 69 IN | SYSTOLIC BLOOD PRESSURE: 157 MMHG | WEIGHT: 240 LBS | DIASTOLIC BLOOD PRESSURE: 72 MMHG

## 2021-12-20 DIAGNOSIS — G89.18 POSTOPERATIVE PAIN: Primary | ICD-10-CM

## 2021-12-20 LAB
BILIRUB BLD-MCNC: NEGATIVE MG/DL
CLARITY, POC: CLEAR
COLOR UR: YELLOW
GLUCOSE UR STRIP-MCNC: NEGATIVE MG/DL
KETONES UR QL: NEGATIVE
LEUKOCYTE EST, POC: NEGATIVE
NITRITE UR-MCNC: NEGATIVE MG/ML
PH UR: 5 [PH] (ref 5–8)
PROT UR STRIP-MCNC: NEGATIVE MG/DL
RBC # UR STRIP: NEGATIVE /UL
SP GR UR: 1.03 (ref 1–1.03)
UROBILINOGEN UR QL: NORMAL

## 2021-12-20 PROCEDURE — 99024 POSTOP FOLLOW-UP VISIT: CPT | Performed by: NURSE PRACTITIONER

## 2021-12-20 PROCEDURE — 81002 URINALYSIS NONAUTO W/O SCOPE: CPT | Performed by: NURSE PRACTITIONER

## 2021-12-20 RX ORDER — HYDROCODONE BITARTRATE AND ACETAMINOPHEN 10; 325 MG/1; MG/1
1 TABLET ORAL EVERY 4 HOURS PRN
Qty: 18 TABLET | Refills: 0 | Status: SHIPPED | OUTPATIENT
Start: 2021-12-20 | End: 2021-12-23

## 2021-12-20 NOTE — PROGRESS NOTES
"Chief Complaint  Post-op (pain)    Subjective          Jean-Pierre Anaya 66 y.o. male presents to [facility] UROLOGY  Patient walk in to office with spouse requesting refill pain medication. Significant pain post op.  He was not aware Dr Pereira leaving town and having significant pain with his penile implant surgery 12-13-21. Patient did bring in prescription bottle with rx dr pereira oxycodone 10/325 . Order to take 1 q 6 hours prn and has been using  Tramadol  per pcp for coverage to extend amount of tablets. Reported that Dr Pereira recommended using the tramadol however he only receives limited supply per pcp, not prescribed for surgical procedure.  He is improving from implant surgery however still  very painful and worsens with ambulation. Significant bruising present  and has swelling. Small amount of discharge from surgical scotal incision. Denies fever or chills. Voiding without difficulty.     Review of Systems   Constitutional: Negative for chills and fever.   Genitourinary: Negative for difficulty urinating and hematuria.        Scrotal and penile pain and swelling. Bruising      Objective   Vital Signs:   /72   Pulse 87   Temp 96.7 °F (35.9 °C)   Ht 175.3 cm (69\")   Wt 109 kg (240 lb)   BMI 35.44 kg/m²      Past Surgical History:   Procedure Laterality Date   • CARDIAC CATHETERIZATION     • CARPAL TUNNEL RELEASE Right 01/18/2019    CTR   • CATARACT EXTRACTION, BILATERAL  11/2021   • COLONOSCOPY     • CYSTOSCOPY     • EYE SURGERY      EYE IMPLANT   • KIDNEY STONE SURGERY     • PENILE PROSTHESIS IMPLANT Bilateral 12/13/2021    Procedure: PENILE PROSTHESIS PLACEMENT;  Surgeon: Serafin Pereira MD;  Location: [facility];  Service: Urology;  Laterality: Bilateral;   • PROSTATE LASER ABLATION/ENUCLEATION     • TUMOR REMOVAL      fatty tumor- from back   • TURP / TRANSURETHRAL INCISION / DRAINAGE PROSTATE  04/26/2017        Physical Exam  Constitutional:       General: He " is not in acute distress.     Appearance: He is well-developed. He is not diaphoretic.   Cardiovascular:      Rate and Rhythm: Normal rate.   Pulmonary:      Effort: Pulmonary effort is normal.   Genitourinary:     Comments: Scrotal incision approximated. Difficult to visualize due to sutures covered by skin adhesive. Crusted brownish discharge present along sutures. No visible active bleeding and no purulent discharge or erythema. Significant ecchymosis penis and scrotal region. Meatal opening visible.   Skin:     General: Skin is warm and dry.   Neurological:      Mental Status: He is alert and oriented to person, place, and time.   Psychiatric:         Behavior: Behavior normal. Behavior is cooperative.         Thought Content: Thought content normal.         Judgment: Judgment normal.      Comments: Grimacing with ambulation         Result Review :     POC Urinalysis Dipstick (12/20/2021 11:50)                Assessment and Plan    Diagnoses and all orders for this visit:    1. Postoperative pain (Primary)  Comments:  recent penile implant  Orders:  -     POC Urinalysis Dipstick  -     HYDROcodone-acetaminophen (NORCO)  MG per tablet; Take 1 tablet by mouth Every 4 (Four) Hours As Needed for Moderate Pain  (may take 1/2 to 1 tablet prn) for up to 3 days.  Dispense: 18 tablet; Refill: 0    Continue ice and elevation. Incision and implant no  severe complication or infection visably present. Apply bacitracin ointment to incision region  bid prn. Follow up with Dr Pereira when returns on 6th Jan instead of the 4th to check penile implant and determine if properly healed and instruction.  Moderate bruising present penis scrotum and pubic region. Small amount of serosanguinous discharge present undergarment. Advised to wear supportive underwear and continue elevation and ice as needed. Finish antibiotic as prescribed and if problems or develops worsening pain, seek medical attention/ER.    Follow Up   No  follow-ups on file.  Patient was given instructions and counseling regarding his condition or for health maintenance advice. Please see specific information pulled into the AVS if appropriate.     Lilly Jackson, APRN

## 2021-12-21 ENCOUNTER — TELEPHONE (OUTPATIENT)
Dept: PHYSICAL THERAPY | Facility: CLINIC | Age: 66
End: 2021-12-21

## 2022-01-06 ENCOUNTER — OFFICE VISIT (OUTPATIENT)
Dept: UROLOGY | Facility: CLINIC | Age: 67
End: 2022-01-06

## 2022-01-06 VITALS
TEMPERATURE: 97.3 F | HEART RATE: 64 BPM | WEIGHT: 240 LBS | HEIGHT: 69 IN | SYSTOLIC BLOOD PRESSURE: 156 MMHG | DIASTOLIC BLOOD PRESSURE: 68 MMHG | BODY MASS INDEX: 35.55 KG/M2

## 2022-01-06 DIAGNOSIS — Z09 POSTOP CHECK: Primary | ICD-10-CM

## 2022-01-06 LAB
BILIRUB BLD-MCNC: NEGATIVE MG/DL
CLARITY, POC: CLEAR
COLOR UR: YELLOW
GLUCOSE UR STRIP-MCNC: NEGATIVE MG/DL
KETONES UR QL: NEGATIVE
LEUKOCYTE EST, POC: NEGATIVE
NITRITE UR-MCNC: NEGATIVE MG/ML
PH UR: 6 [PH] (ref 5–8)
PROT UR STRIP-MCNC: NEGATIVE MG/DL
RBC # UR STRIP: NEGATIVE /UL
SP GR UR: 1.03 (ref 1–1.03)
UROBILINOGEN UR QL: NORMAL

## 2022-01-06 PROCEDURE — 99024 POSTOP FOLLOW-UP VISIT: CPT | Performed by: UROLOGY

## 2022-01-06 PROCEDURE — 81002 URINALYSIS NONAUTO W/O SCOPE: CPT | Performed by: UROLOGY

## 2022-01-06 NOTE — PROGRESS NOTES
"Chief Complaint  Follow-up (post op implant)    Subjective          Jean-Pierre Anaya presents to [facility] UROLOGY  History of Present Illness    Patient had penile implant and is doing fine. He does have constipation and has developed hemorrhoids but he is dealing with it. Patient request stool softener    Objective No acute distress  Vital Signs:   /68   Pulse 64   Temp 97.3 °F (36.3 °C) (Infrared)   Ht 175.3 cm (69\")   Wt 109 kg (240 lb)   BMI 35.44 kg/m²     No Known Allergies   Past medical history:  has a past medical history of Abnormal EKG, Arthritis, Asthma, Bacteriuria (02/17/2021), Benign essential hypertension, Bilateral carpal tunnel syndrome (01/29/2019), BPH (benign prostatic hyperplasia), Chronic kidney disease, Diabetes (HCC), Diabetes mellitus type 2, noninsulin dependent (HCC), ED (erectile dysfunction), HLD (hyperlipidemia), HTN (hypertension), Hyperlipemia, Hypogonadism in male, Leg swelling, Neuropathy, Preop examination (01/29/2019), Prostate disorder, Renal calculus or stone, and Seasonal allergies.   Past surgical history:  has a past surgical history that includes Carpal tunnel release (Right, 01/18/2019); Colonoscopy; Cystoscopy; Eye surgery; prostate laser ablation/enucleation; Kidney stone surgery; TURP / transurethral incision / drainage prostate (04/26/2017); Cataract extraction, bilateral (11/2021); Tumor removal; Cardiac catheterization; and Penile prosthesis implant (Bilateral, 12/13/2021).  Personal history: family history includes Arthritis in his mother; Breast cancer in his mother; Kidney cancer in his father; Lung cancer in his father; Osteoporosis in his mother.  Social history:  reports that he has never smoked. He has never used smokeless tobacco. He reports that he does not drink alcohol and does not use drugs.    Review of Systems    No change from last time    Physical Exam    Scrotum is normal    Penis is normal    Implant pump is " sitting nicely.    Still has chromic sutures in the midline which were removed  Result Review :                 Assessment and Plan    Diagnoses and all orders for this visit:    1. Postop check (Primary)  -     POC Urinalysis Dipstick    Patient is doing fine. Sutures were removed from scrotum. We will bring him back in February and teach him how the implant works    Follow Up   No follow-ups on file.  Patient was given instructions and counseling regarding his condition or for health maintenance advice. Please see specific information pulled into the AVS if appropriate.     Serafin Pereira MD

## 2022-01-10 ENCOUNTER — TRANSCRIBE ORDERS (OUTPATIENT)
Dept: ADMINISTRATIVE | Facility: HOSPITAL | Age: 67
End: 2022-01-10

## 2022-01-10 DIAGNOSIS — N28.1 CYST OF RIGHT KIDNEY: Primary | ICD-10-CM

## 2022-01-11 NOTE — PROGRESS NOTES
"   Progress Note      Patient Name: Jean-Pierre Anaya   Patient ID: [id number]   Sex: Male   YOB: 1955    Primary Care Provider: Ian Wilson MD   Referring Provider: Ian Wilson MD    Visit Date: February 9, 2021    Provider: Jose David Hernández MD   Location: [facility]   Location Address: [address] [place] [address], [address]   DRE Humphries  [id number]   Location Phone: [phone]          Chief Complaint     Hypertension.       History Of Present Illness  Jean-Pierre Anaya is a 65 year old /White male with a history of hypertension, no chest pain or shortness of breath.   CURRENT MEDICATIONS: Medications have been reviewed and are as stated.   PAST MEDICAL HISTORY: Arthritis; Asthma; BPH (benign prostatic hyperplasia); Chronic kidney disease; Diabetes mellitus type 2, noninsulin dependent; Hyperlipidemia; Hypertension; Prostate disorder; Renal calculus or stone; Seasonal allergies.   PSYCHOSOCIAL HISTORY: Denies alcohol use. Denies tobacco use.      ALLERGIES: No known drug allergies.       Review of Systems  · Cardiovascular  o Admits  o : swelling (feet, ankles, hands)  o Denies  o : palpitations (fast, fluttering, or skipping beats), shortness of breath while walking or lying flat, chest pain or angina pectoris   · Respiratory  o Denies  o : chronic or frequent cough      Vitals  Date Time BP Position Site L\R Cuff Size HR RR TEMP (F) WT  HT  BMI kg/m2 BSA m2 O2 Sat FR L/min FiO2 HC       02/09/2021 01:25 /70 Sitting    66 - R   250lbs 16oz 5'  9\" 37.07 2.35             Physical Examination  · Constitutional  o Appearance  o : Awake, alert, cooperative, pleasant.  · Respiratory  o Inspection of Chest  o : No chest wall deformities, moving equal.  o Auscultation of Lungs  o : Good air entry with vesicular breath sounds. Lungs clear.  · Cardiovascular  o Heart  o :   § Auscultation of Heart  § : S1 and S2 regular. No S3. No S4. No murmurs.  o Peripheral Vascular System  o : " [facility]  Progress Note - Jatinder Flow 1965, 60 y o  male MRN: [id number]  Unit/Bed#: 7T [facility] 706-01 Encounter: [id number]  Primary Care Provider: Roxy Read MD   Date and time admitted to hospital: 12/29/2021  5:38 PM    * Ambulatory dysfunction  Assessment & Plan  · PT/OT recommending post acute rehab  · [facility] denied the patient as they are out of bed capacity, Pending placement  · Medically stable for discharge    Intertrigo  Assessment & Plan  · Patient has intertrigo of the groin area  · Will give nystatin powder b i d  Hypertension  Assessment & Plan  BP acceptable  · Amlodipine 10 mg PO daily  · Monitor blood pressures    Psoriasis, unspecified  Assessment & Plan  · Patient with psoriasis flare, was on Enbrel on past however was lost to follow-up  · Outpatient Dermatology follow-up      Homeless  Assessment & Plan  · Patient was to be discharged to the [facility] however they would not take him back  · Case management for discharge planning    Right arm cellulitis  Assessment & Plan  · Resolved  · Patient with right forearm lesion with redness and streaking noted in ED  · ID consulted, Completed Keflex course  · Antibiotics have been discontinued      VTE Pharmacologic Prophylaxis:   Pharmacologic: Enoxaparin (Lovenox)  Mechanical VTE Prophylaxis in Place: Yes    Patient Centered Rounds: I have performed bedside rounds with nursing staff today  Discussions with Specialists or Other Care Team Provider:  Case Management, nurse    Education and Discussions with Family / Patient:  Discussed with patient    Time Spent for Care: 45 minutes  More than 50% of total time spent on counseling and coordination of care as described above      Current Length of Stay: 12 day(s)    Current Patient Status: Inpatient   Certification Statement: The patient will continue to require additional inpatient hospital stay due to Pending placement    Discharge Plan   § Extremities  § : Peripheral pulses were well felt. No edema. No cyanosis.  · Gastrointestinal  o Abdominal Examination  o : No masses or organomegaly noted.  · EKG  o EKG  o : Was performed in the office today.  o Indications  o : Chest pain.  o Results  o : Sinus rhythm with T-wave inversions in the anterolateral leads.   o Comparison  o : No signifigant change since previous EKG.          Assessment     1.  Coronary artery disease, atypical chest pain, normal coronaries by cardiac catheterization.   2.  Type 2 diabetes, complications, managed by PMD.  3.  Chronic kidney disease stage 2-3 stable.  4.  Hyperlipidemia. Continue Lipitor, managed by PMD.    FOLLOW-UP:   See me back in 6 months.        Jose David Hernández MD  ALLA/vh               Electronically Signed by: Patty Vallecillo-, OT -Author on February 24, 2021 09:27:17 AM  Electronically Co-signed by: Jose David Hernández MD -Reviewer on March 15, 2021 12:03:26 PM   / Estimated Discharge Date:  Pending      Code Status: Level 1 - Full Code      Subjective:   Patient was seen and examined at bedside  The patient complain of pain at the groin area and seems to have Dr Laena Sherman  denies any pain, headache, blurry vision, chest pain, palpitation, shortness of breath, N/V, abd pain  Objective:     Vitals:   Temp (24hrs), Av 1 °F (36 7 °C), Min:97 7 °F (36 5 °C), Max:98 6 °F (37 °C)    Temp:  [97 7 °F (36 5 °C)-98 6 °F (37 °C)] 97 7 °F (36 5 °C)  HR:  [70-84] 70  Resp:  [16-18] 16  BP: (145-179)/(62-96) 171/83  SpO2:  [97 %-100 %] 100 %  Body mass index is 23 82 kg/m²  Input and Output Summary (last 24 hours): Intake/Output Summary (Last 24 hours) at 2022 1431  Last data filed at 2022 1300  Gross per 24 hour   Intake 1440 ml   Output 1100 ml   Net 340 ml       Physical Exam:     Physical Exam  General: breathing well on room air, no acute distress  HEENT: NC/AT, PERRL, EOM - normal  Neck: Supple  Pulm/Chest: Normal chest wall expansion, clear breath sounds on both side, no wheezing/rhonchi or crackles appreciated  CVS: RRR, normal S1&S2, no murmur appreciated, capillary refill <2s  Abd: soft, non tender, non distended, bowel sounds +  MSK: move all 4 extremities spontaneously, intertrigo noted  Psoriasis noted  Skin: warm  CNS: no acute focal neuro deficit      Additional Data:     Labs:    Results from last 7 days   Lab Units 22  0436   WBC Thousand/uL 6 00   HEMOGLOBIN g/dL 12 8*   HEMATOCRIT % 39 5*   PLATELETS Thousands/uL 347   NEUTROS PCT % 62   LYMPHS PCT % 25   MONOS PCT % 10   EOS PCT % 3     Results from last 7 days   Lab Units 22  0448   POTASSIUM mmol/L 3 7   CHLORIDE mmol/L 105   CO2 mmol/L 30   BUN mg/dL 13   CREATININE mg/dL 0 66*   CALCIUM mg/dL 8 4           * I Have Reviewed All Lab Data Listed Above  * Additional Pertinent Lab Tests Reviewed:  Bola 66 Admission Reviewed    Imaging:    Imaging Reports Reviewed Today Include: None  Imaging Personally Reviewed by Myself Includes:  None      Recent Cultures (last 7 days):           Last 24 Hours Medication List:   Current Facility-Administered Medications   Medication Dose Route Frequency Provider Last Rate    acetaminophen  650 mg Oral Q4H PRN Lorna Miles PA-C      amLODIPine  10 mg Oral Daily Lj Shereen, DO      diphenhydrAMINE  25 mg Oral Q6H PRN Lj Shereen, DO      enoxaparin  40 mg Subcutaneous Daily Lorna Miles PA-C      nicotine  1 patch Transdermal Daily [facility] [place]      nystatin   Topical BID Ron Cuevas MD      ondansetron  4 mg Oral Q6H PRN Lj Shereen, DO      polyethylene glycol  17 g Oral Daily PRN Slava Urrutia PA-C      white petrolatum-mineral oil   Topical TID PRN Othelahmet Aragon DO          Today, Patient Was Seen By: Ron Cuevas MD    ** Please Note: Dragon 360 Dictation voice to text software may have been used in the creation of this document   **

## 2022-01-21 ENCOUNTER — HOSPITAL ENCOUNTER (OUTPATIENT)
Dept: CT IMAGING | Facility: HOSPITAL | Age: 67
Discharge: HOME | End: 2022-01-21
Admitting: INTERNAL MEDICINE

## 2022-01-21 DIAGNOSIS — N28.1 CYST OF RIGHT KIDNEY: ICD-10-CM

## 2022-01-21 LAB — CREAT BLDA-MCNC: 1.4 MG/DL

## 2022-01-21 PROCEDURE — 82565 ASSAY OF CREATININE: CPT

## 2022-01-21 PROCEDURE — 74170 CT ABD WO CNTRST FLWD CNTRST: CPT

## 2022-01-21 PROCEDURE — 0 IOPAMIDOL PER 1 ML: Performed by: INTERNAL MEDICINE

## 2022-01-21 RX ADMIN — IOPAMIDOL 100 ML: 755 INJECTION, SOLUTION INTRAVENOUS at 15:24

## 2022-01-31 ENCOUNTER — OFFICE VISIT (OUTPATIENT)
Dept: UROLOGY | Facility: CLINIC | Age: 67
End: 2022-01-31

## 2022-01-31 VITALS
HEIGHT: 69 IN | DIASTOLIC BLOOD PRESSURE: 71 MMHG | TEMPERATURE: 98 F | BODY MASS INDEX: 35.55 KG/M2 | HEART RATE: 78 BPM | WEIGHT: 240 LBS | SYSTOLIC BLOOD PRESSURE: 148 MMHG

## 2022-01-31 DIAGNOSIS — Z48.89 POSTOPERATIVE VISIT: Primary | ICD-10-CM

## 2022-01-31 PROCEDURE — 99024 POSTOP FOLLOW-UP VISIT: CPT | Performed by: UROLOGY

## 2022-01-31 RX ORDER — NALOXEGOL OXALATE 25 MG/1
TABLET, FILM COATED ORAL
COMMUNITY
Start: 2021-12-27 | End: 2022-07-18

## 2022-01-31 NOTE — PROGRESS NOTES
"Chief Complaint  Post-op Follow-up    Patient had penile implant    Subjective          Jean-Pierre Eric Anaya presents to [facility]  History of Present Illness    Patient had penile implant 6 weeks ago and is doing fine and is nicely healed    Objective   Vital Signs:   /71   Pulse 78   Temp 98 °F (36.7 °C)   Ht 175.3 cm (69\")   Wt 109 kg (240 lb)   BMI 35.44 kg/m²     No Known Allergies   Past medical history:  has a past medical history of Abnormal EKG, Arthritis, Asthma, Bacteriuria (02/17/2021), Benign essential hypertension, Bilateral carpal tunnel syndrome (01/29/2019), BPH (benign prostatic hyperplasia), Chronic kidney disease, Diabetes (HCC), Diabetes mellitus type 2, noninsulin dependent (HCC), ED (erectile dysfunction), HLD (hyperlipidemia), HTN (hypertension), Hyperlipemia, Hypogonadism in male, Leg swelling, Neuropathy, Preop examination (01/29/2019), Prostate disorder, Renal calculus or stone, and Seasonal allergies.   Past surgical history:  has a past surgical history that includes Carpal tunnel release (Right, 01/18/2019); Colonoscopy; Cystoscopy; Eye surgery; prostate laser ablation/enucleation; Kidney stone surgery; TURP / transurethral incision / drainage prostate (04/26/2017); Cataract extraction, bilateral (11/2021); Tumor removal; Cardiac catheterization; and Penile prosthesis implant (Bilateral, 12/13/2021).  Personal history: family history includes Arthritis in his mother; Breast cancer in his mother; Kidney cancer in his father; Lung cancer in his father; Osteoporosis in his mother.  Social history:  reports that he has never smoked. He has never used smokeless tobacco. He reports that he does not drink alcohol and does not use drugs.    Review of Systems    No change from before    Physical Exam    Incision is nicely healed    Pump is in the lower scrotum  Result Review :                 Assessment and Plan    Diagnoses and all orders for this " visit:    1. Postoperative visit (Primary)    We have taught the patient how to use the penile implant.  We will recheck him in 6 months time    Follow Up   No follow-ups on file.  Patient was given instructions and counseling regarding his condition or for health maintenance advice. Please see specific information pulled into the AVS if appropriate.     Serafin Pereira MD

## 2022-02-10 ENCOUNTER — DOCUMENTATION (OUTPATIENT)
Dept: PHYSICAL THERAPY | Facility: CLINIC | Age: 67
End: 2022-02-10

## 2022-02-10 NOTE — PROGRESS NOTES
Discharge Summary  Discharge Summary from Occupational Therapy Report    Patient Information  Jean-Pierre Anaya  1955    Dates OT visit: 10/29/21-12/8/21  Number of Visits: 6     Discharge Status of Patient: Reeval dated 12/8/21    Goals: Partially Met    Visit Diagnoses:  No diagnosis found.    Discharge Plan: Continue with current home exercise program as instructed    Comments Pt independent with HEP and having other procedures.    Date of Discharge 2/10/22        Fior Dela Cruz OTR/L  Occupational Therapist  License number [id number]

## 2022-05-11 ENCOUNTER — DOCUMENTATION (OUTPATIENT)
Dept: PHYSICAL THERAPY | Facility: CLINIC | Age: 67
End: 2022-05-11

## 2022-05-11 DIAGNOSIS — M22.2X1 RIGHT PATELLOFEMORAL SYNDROME: ICD-10-CM

## 2022-05-11 DIAGNOSIS — M25.561 RIGHT KNEE PAIN, UNSPECIFIED CHRONICITY: Primary | ICD-10-CM

## 2022-05-11 DIAGNOSIS — R26.9 GAIT DISTURBANCE: ICD-10-CM

## 2022-05-11 NOTE — PROGRESS NOTES
Discharge Summary  Discharge Summary from Physical Therapy Report          Goals: Partially Met    Discharge Plan: Continue with current home exercise program as instructed    Comments Pt was to perform HEP, had no difficulty with this. DC to home program. See last progress note for goals.          Tavon Velasco PT  Physical Therapist    Electronically signed 5/11/2022    [place] License: PT - [id number]

## 2022-05-24 ENCOUNTER — PREP FOR SURGERY (OUTPATIENT)
Dept: OTHER | Facility: HOSPITAL | Age: 67
End: 2022-05-24

## 2022-05-24 ENCOUNTER — TELEPHONE (OUTPATIENT)
Dept: CARDIOLOGY | Facility: CLINIC | Age: 67
End: 2022-05-24

## 2022-05-24 ENCOUNTER — OFFICE VISIT (OUTPATIENT)
Dept: GASTROENTEROLOGY | Facility: CLINIC | Age: 67
End: 2022-05-24

## 2022-05-24 VITALS
BODY MASS INDEX: 35.07 KG/M2 | HEART RATE: 78 BPM | DIASTOLIC BLOOD PRESSURE: 78 MMHG | SYSTOLIC BLOOD PRESSURE: 153 MMHG | HEIGHT: 69 IN | WEIGHT: 236.8 LBS

## 2022-05-24 DIAGNOSIS — R10.13 EPIGASTRIC PAIN: ICD-10-CM

## 2022-05-24 DIAGNOSIS — K59.04 CHRONIC IDIOPATHIC CONSTIPATION: ICD-10-CM

## 2022-05-24 DIAGNOSIS — R11.2 NAUSEA AND VOMITING, UNSPECIFIED VOMITING TYPE: Primary | ICD-10-CM

## 2022-05-24 PROCEDURE — 99214 OFFICE O/P EST MOD 30 MIN: CPT | Performed by: NURSE PRACTITIONER

## 2022-05-24 RX ORDER — ONDANSETRON 4 MG/1
4 TABLET, ORALLY DISINTEGRATING ORAL EVERY 8 HOURS PRN
COMMUNITY
End: 2023-03-16

## 2022-05-24 RX ORDER — PANTOPRAZOLE SODIUM 40 MG/1
40 TABLET, DELAYED RELEASE ORAL DAILY
COMMUNITY
Start: 2022-05-18

## 2022-05-24 NOTE — TELEPHONE ENCOUNTER
Procedure: colonoscopy and/or EGD    Med Directive: NA    PMH: Precordial atypical CP, HTN, hyperlipidemia     Last Seen: 11/02/21

## 2022-05-24 NOTE — PROGRESS NOTES
Patient Name: Jean-Pierre Anaya   Visit Date: 05/24/2022   Patient ID: [id number]  Provider: SAVANNAH Abarca    Sex: male  Location:  Location Address:  Location Phone: [address]  [place] [address] [phone]    YOB: 1955  Age: 66 y.o.   Primary Care Provider Ian Wilson MD      Referring Provider: Ian Wilson MD        Chief Complaint  Vomiting (Started 6 months ago, 1 time a week usually. Has been vomiting for the last 2 days pt states could be from what he ate over the weekend ), Constipation (Started a few months ago, off and on ), and Diarrhea (Pt had diarrhea yesterday 1 time. Last month once or 2 times but then constipation after those occurrences. )    History of Present Illness  New pt presents w c/o intermittent nausea x 6 months--2-4 x month. Has had vomiting 1-2 x month states if he takes Zofran quickly enough he usually will not vomit.   Has had some upper abdominal pain and towards the right. States couldn't sleep well last night d/t pain, doesn't notice r/t foods. Thinks may be r/t constipation.  Has had some constipation, states hard stools yesterday followed by loose stools. No blood seen in stool.  Has tried Colace w/o benefit. Has tried Dulcolax w/o success.   Trying to get off Mobic d/t CKD within the last 3-4 months, he has taken very rarely in the last couple months. Started on Tramadol d/t stopping NSAIDs.   Has been on Protonix x 4 months, has run out, has not had HB much. Not sure if Protonix has helped.   Has been on Ozempic x 1 yr    CT of the abdomen with and without contrast 1/21/2022: No acute abnormality seen, bilateral nonobstructing kidney stones noted in bilateral renal cyst with benign appearance    Last colonoscopy 2013 with Dr. Ferrell: Adequate prep there were no polyps found or abnormality noted    Has had COVID vaccine  Follows w Dr Hernández  Past Medical History:   Diagnosis Date   • Abnormal EKG    • Arthritis    • Asthma    •  "Bacteriuria 02/17/2021   • Benign essential hypertension    • Bilateral carpal tunnel syndrome 01/29/2019   • BPH (benign prostatic hyperplasia)    • Chronic kidney disease     stage 3   • Diabetes (HCC)    • Diabetes mellitus type 2, noninsulin dependent (HCC)    • ED (erectile dysfunction)    • HLD (hyperlipidemia)    • HTN (hypertension)    • Hyperlipemia    • Hypogonadism in male    • Leg swelling    • Neuropathy    • Preop examination 01/29/2019    BILATERAL CTS WANTS TO HAVE RIGHT CTR BEFORE LEFT   • Prostate disorder    • Renal calculus or stone    • Seasonal allergies        Past Surgical History:   Procedure Laterality Date   • CARDIAC CATHETERIZATION     • CARPAL TUNNEL RELEASE Right 01/18/2019    CTR   • CATARACT EXTRACTION, BILATERAL  11/2021   • COLONOSCOPY     • CYSTOSCOPY     • EYE SURGERY      EYE IMPLANT   • KIDNEY STONE SURGERY     • PENILE PROSTHESIS IMPLANT Bilateral 12/13/2021    Procedure: PENILE PROSTHESIS PLACEMENT;  Surgeon: Serafin Pereira MD;  Location: [facility] OR;  Service: Urology;  Laterality: Bilateral;   • PROSTATE LASER ABLATION/ENUCLEATION     • TUMOR REMOVAL      fatty tumor- from back   • TURP / TRANSURETHRAL INCISION / DRAINAGE PROSTATE  04/26/2017       No Known Allergies    Family History   Problem Relation Age of Onset   • Breast cancer Mother    • Osteoporosis Mother    • Arthritis Mother    • Lung cancer Father    • Kidney cancer Father    • Colon cancer Neg Hx         Social History     Tobacco Use   • Smoking status: Never Smoker   • Smokeless tobacco: Never Used   Vaping Use   • Vaping Use: Never used   Substance Use Topics   • Alcohol use: Never   • Drug use: Never       Objective     Vital Signs:   /78 (BP Location: Left arm, Patient Position: Sitting, Cuff Size: Adult)   Pulse 78   Ht 175.3 cm (69\")   Wt 107 kg (236 lb 12.8 oz)   BMI 34.97 kg/m²       Physical Exam  Constitutional:       General: The patient is not in acute distress.     " Appearance: Normal appearance.   HENT:      Head: Normocephalic and atraumatic.      Nose: Nose normal.   Pulmonary:      Effort: Pulmonary effort is normal. No respiratory distress.   Abdominal:      General: Abdomen is flat.      Palpations: Abdomen is soft. There is no mass.      Tenderness: There is no abdominal tenderness. There is no guarding.   Musculoskeletal:      Cervical back: Neck supple.      Right lower leg: No edema.      Left lower leg: No edema.   Skin:     General: Skin is warm and dry.   Neurological:      General: No focal deficit present.      Mental Status: The patient is alert and oriented to person, place, and time.      Gait: Gait normal.   Psychiatric:         Mood and Affect: Mood normal.         Speech: Speech normal.         Behavior: Behavior normal.         Thought Content: Thought content normal.     Result Review :   The following data was reviewed by: SAVANNAH Abarca on 05/24/2022:    CBC w/diff    CBC w/Diff 8/11/21 12/13/21 12/14/21   WBC 9.09 7.19 11.65 (A)   RBC 5.33 4.91 4.46   Hemoglobin 15.5 14.9 13.6   Hematocrit 46.6 42.8 38.9   MCV 87.4 87.2 87.2   MCH 29.1 30.3 30.5   MCHC 33.3 34.8 35.0   RDW 12.2 (A) 12.1 (A) 12.2 (A)   Platelets 224 209 192   Neutrophil Rel % 74.0 61.3 78.2 (A)   Immature Granulocyte Rel % 0.6 (A) 0.6 (A) 0.3   Lymphocyte Rel % 13.9 (A) 24.8 10.5 (A)   Monocyte Rel % 8.8 9.6 9.3   Eosinophil Rel % 2.3 3.1 1.5   Basophil Rel % 0.4 0.6 0.2   (A) Abnormal value            CMP    CMP 12/13/21 12/14/21 1/21/22   Glucose 162 (A) 155 (A)    BUN 20 22    Creatinine 1.51 (A) 1.70 (A) 1.40   eGFR Non  Am 46 (A) 40 (A)    Sodium 140 138    Potassium 4.3 4.3    Chloride 107 106    Calcium 8.9 8.3 (A)    Albumin 4.00 3.40 (A)    Total Bilirubin 0.3 0.6    Alkaline Phosphatase 71 62    AST (SGOT) 27 20    ALT (SGPT) 29 24    (A) Abnormal value       Comments are available for some flowsheets but are not being displayed.                          Assessment and Plan    Diagnoses and all orders for this visit:    1. Nausea and vomiting, unspecified vomiting type (Primary)    2. Chronic idiopathic constipation    3. Epigastric pain    Other orders  -     linaclotide (Linzess) 72 MCG capsule capsule; Take 1 capsule by mouth Every Morning Before Breakfast for 90 days.  Dispense: 90 capsule; Refill: 0            Follow Up   Return for follow up after procedure.   EGD Surgical Risk and Benefits: Possible risks/complications, benefits, and alternatives to surgical or invasive procedure have been explained to patient and/or legal guardian; risks include bleeding, infection, and perforation. Patient has been evaluated and can tolerate anesthesia and/or sedation. Risks, benefits, and alternatives to anesthesia and sedation have been explained to patient and/or legal guardian. Cardiac clearance Dr Hernández  Giving pt gastroparesis diet d/t diabetes and on Ozempic - recommend he follow this plan and r/w pt.  Do not eat 4 hrs before bed  Try to stop liquid sugar (sweet tea, cokes)    Patient was given instructions and counseling regarding his condition or for health maintenance advice. Please see specific information pulled into the AVS if appropriate.

## 2022-05-24 NOTE — PATIENT INSTRUCTIONS
Gastroparesis    Gastroparesis is a condition in which food takes longer than normal to empty from the stomach. This condition is also known as delayed gastric emptying. It is usually a long-term (chronic) condition.  There is no cure, but there are treatments and things that you can do at home to help relieve symptoms. Treating the underlying condition that causes gastroparesis can also help relieve symptoms.  What are the causes?  In many cases, the cause of this condition is not known. Possible causes include:  A hormone (endocrine) disorder, such as hypothyroidism or diabetes.  A nervous system disease, such as Parkinson's disease or multiple sclerosis.  Cancer, infection, or surgery that affects the stomach or vagus nerve. The vagus nerve runs from your chest, through your neck, and to the lower part of your brain.  A connective tissue disorder, such as scleroderma.  Certain medicines.  What increases the risk?  You are more likely to develop this condition if:  You have certain disorders or diseases. These may include:  An endocrine disorder.  An eating disorder.  Amyloidosis.  Scleroderma.  Parkinson's disease.  Multiple sclerosis.  Cancer or infection of the stomach or the vagus nerve.  You have had surgery on your stomach or vagus nerve.  You take certain medicines.  You are female.  What are the signs or symptoms?  Symptoms of this condition include:  Feeling full after eating very little or a loss of appetite.  Nausea, vomiting, or heartburn.  Bloating of your abdomen.  Inconsistent blood sugar (glucose) levels on blood tests.  Unexplained weight loss.  Acid from the stomach coming up into the esophagus (gastroesophageal reflux).  Sudden tightening (spasm) of the stomach, which can be painful.  Symptoms may come and go. Some people may not notice any symptoms.  How is this diagnosed?  This condition is diagnosed with tests, such as:  Tests that check how long it takes food to move through the stomach and  intestines. These tests include:  Upper gastrointestinal (GI) series. For this test, you drink a liquid that shows up well on X-rays, and then X-rays are taken of your intestines.  Gastric emptying scintigraphy. For this test, you eat food that contains a small amount of radioactive material, and then scans are taken.  Wireless capsule GI monitoring system. For this test, you swallow a pill (capsule) that records information about how foods and fluid move through your stomach.  Gastric manometry. For this test, a tube is passed down your throat and into your stomach to measure electrical and muscular activity.  Endoscopy. For this test, a long, thin tube with a camera and light on the end is passed down your throat and into your stomach to check for problems in your stomach lining.  Ultrasound. This test uses sound waves to create images of the inside of your body. This can help rule out gallbladder disease or pancreatitis as a cause of your symptoms.  How is this treated?  There is no cure for this condition, but treatment and home care may relieve symptoms. Treatment may include:  Treating the underlying cause.  Managing your symptoms by making changes to your diet and exercise habits.  Taking medicines to control nausea and vomiting and to stimulate stomach muscles.  Getting food through a feeding tube in the hospital. This may be done in severe cases.  Having surgery to insert a device called a gastric electrical stimulator into your body. This device helps improve stomach emptying and control nausea and vomiting.  Follow these instructions at home:  Take over-the-counter and prescription medicines only as told by your health care provider.  Follow instructions from your health care provider about eating or drinking restrictions. Your health care provider may recommend that you:  Eat smaller meals more often.  Eat low-fat foods.  Eat low-fiber forms of high-fiber foods. For example, eat cooked vegetables instead  of raw vegetables.  Have only liquid foods instead of solid foods. Liquid foods are easier to digest.  Drink enough fluid to keep your urine pale yellow.  Exercise as often as told by your health care provider.  Keep all follow-up visits. This is important.  Contact a health care provider if you:  Notice that your symptoms do not improve with treatment.  Have new symptoms.  Get help right away if you:  Have severe pain in your abdomen that does not improve with treatment.  Have nausea that is severe or does not go away.  Vomit every time you drink fluids.  Summary  Gastroparesis is a long-term (chronic) condition in which food takes longer than normal to empty from the stomach.  Symptoms include nausea, vomiting, heartburn, bloating of your abdomen, and loss of appetite.  Eating smaller portions, low-fat foods, and low-fiber forms of high-fiber foods may help you manage your symptoms.  Get help right away if you have severe pain in your abdomen.  This information is not intended to replace advice given to you by your health care provider. Make sure you discuss any questions you have with your health care provider.  Document Revised: 04/26/2021 Document Reviewed: 04/26/2021  [employer] Patient Education © 2021 [employer]

## 2022-05-25 ENCOUNTER — TELEPHONE (OUTPATIENT)
Dept: GASTROENTEROLOGY | Facility: CLINIC | Age: 67
End: 2022-05-25

## 2022-05-25 DIAGNOSIS — R11.2 NAUSEA AND VOMITING, UNSPECIFIED VOMITING TYPE: ICD-10-CM

## 2022-05-25 DIAGNOSIS — R10.84 GENERALIZED ABDOMINAL PAIN: Primary | ICD-10-CM

## 2022-05-25 NOTE — TELEPHONE ENCOUNTER
Pt states when he left the office yesterday he started having mid abd pain/cramping, felt radiating to right side. Had some vomiting yesterday, no vomiting since 4 AM, had diarrhea yesterday, none today. No fever shakes or chills  Feels better today than yesterday  States he feels tender all the way across mid abdomen- generalized    Has not picked up Linzess yet - states 400$, wanting to know if he can try something else - states he has already requested [facility] to order so he will call back if he does need us to send something in    I advised pt if RLQ pain returns or has vomiting go to ER tonight. Otherwise, requested pt go do labs in AM. Also if diarrhea returns to let us know and will check stools. Explained not to take Linzess yet as he has had diarrhea, however pt is fairly certain constipation will return.

## 2022-05-25 NOTE — TELEPHONE ENCOUNTER
Pt called complaining of RLQ ABD pain, pt had a few episodes of vomiting, had diarrhea all afternoon and this morning. Pt currently does not have symptoms other than mild ABD pain

## 2022-07-18 NOTE — PRE-PROCEDURE INSTRUCTIONS
Pt instructed where to come to and nothing to eat or drink after midnight meds to take in the am ALLOPIRINOL, COREG, AND PROTONIX and arrival time is 1220 PM on 7/19/22 pt has been vaccinated

## 2022-07-19 ENCOUNTER — ANESTHESIA (OUTPATIENT)
Dept: GASTROENTEROLOGY | Facility: HOSPITAL | Age: 67
End: 2022-07-19

## 2022-07-19 ENCOUNTER — ANESTHESIA EVENT (OUTPATIENT)
Dept: GASTROENTEROLOGY | Facility: HOSPITAL | Age: 67
End: 2022-07-19

## 2022-07-19 ENCOUNTER — HOSPITAL ENCOUNTER (OUTPATIENT)
Facility: HOSPITAL | Age: 67
Setting detail: HOSPITAL OUTPATIENT SURGERY
Discharge: HOME | End: 2022-07-19
Attending: INTERNAL MEDICINE | Admitting: INTERNAL MEDICINE

## 2022-07-19 VITALS
OXYGEN SATURATION: 97 % | BODY MASS INDEX: 33.83 KG/M2 | HEIGHT: 69 IN | DIASTOLIC BLOOD PRESSURE: 75 MMHG | HEART RATE: 66 BPM | WEIGHT: 228.4 LBS | SYSTOLIC BLOOD PRESSURE: 134 MMHG | RESPIRATION RATE: 18 BRPM | TEMPERATURE: 98.1 F

## 2022-07-19 DIAGNOSIS — R11.2 NAUSEA AND VOMITING, UNSPECIFIED VOMITING TYPE: ICD-10-CM

## 2022-07-19 DIAGNOSIS — R10.13 EPIGASTRIC PAIN: ICD-10-CM

## 2022-07-19 LAB — GLUCOSE BLDC GLUCOMTR-MCNC: 74 MG/DL (ref 70–99)

## 2022-07-19 PROCEDURE — 88305 TISSUE EXAM BY PATHOLOGIST: CPT | Performed by: INTERNAL MEDICINE

## 2022-07-19 PROCEDURE — 25010000002 PROPOFOL 10 MG/ML EMULSION: Performed by: NURSE ANESTHETIST, CERTIFIED REGISTERED

## 2022-07-19 PROCEDURE — 43239 EGD BIOPSY SINGLE/MULTIPLE: CPT | Performed by: INTERNAL MEDICINE

## 2022-07-19 PROCEDURE — 82962 GLUCOSE BLOOD TEST: CPT

## 2022-07-19 RX ORDER — SODIUM CHLORIDE, SODIUM LACTATE, POTASSIUM CHLORIDE, CALCIUM CHLORIDE 600; 310; 30; 20 MG/100ML; MG/100ML; MG/100ML; MG/100ML
30 INJECTION, SOLUTION INTRAVENOUS CONTINUOUS
Status: DISCONTINUED | OUTPATIENT
Start: 2022-07-19 | End: 2022-07-19 | Stop reason: HOSPADM

## 2022-07-19 RX ORDER — PROPOFOL 10 MG/ML
VIAL (ML) INTRAVENOUS AS NEEDED
Status: DISCONTINUED | OUTPATIENT
Start: 2022-07-19 | End: 2022-07-19 | Stop reason: SURG

## 2022-07-19 RX ORDER — LIDOCAINE HYDROCHLORIDE 20 MG/ML
INJECTION, SOLUTION EPIDURAL; INFILTRATION; INTRACAUDAL; PERINEURAL AS NEEDED
Status: DISCONTINUED | OUTPATIENT
Start: 2022-07-19 | End: 2022-07-19 | Stop reason: SURG

## 2022-07-19 RX ADMIN — PROPOFOL 250 MCG/KG/MIN: 10 INJECTION, EMULSION INTRAVENOUS at 13:57

## 2022-07-19 RX ADMIN — LIDOCAINE HYDROCHLORIDE 100 MG: 20 INJECTION, SOLUTION EPIDURAL; INFILTRATION; INTRACAUDAL; PERINEURAL at 13:57

## 2022-07-19 RX ADMIN — PROPOFOL 50 MG: 10 INJECTION, EMULSION INTRAVENOUS at 13:57

## 2022-07-19 RX ADMIN — SODIUM CHLORIDE, POTASSIUM CHLORIDE, SODIUM LACTATE AND CALCIUM CHLORIDE 30 ML/HR: 600; 310; 30; 20 INJECTION, SOLUTION INTRAVENOUS at 12:38

## 2022-07-19 NOTE — ANESTHESIA PREPROCEDURE EVALUATION
Anesthesia Evaluation     Patient summary reviewed and Nursing notes reviewed   no history of anesthetic complications:  NPO Solid Status: > 8 hours  NPO Liquid Status: > 2 hours           Airway   Mallampati: III  TM distance: >3 FB  Neck ROM: full  Possible difficult intubation  Dental - normal exam     Pulmonary - normal exam   (+) asthma (well-controlled),  Cardiovascular - normal exam  Exercise tolerance: good (4-7 METS)    ECG reviewed    (+) hypertension, hyperlipidemia,     ROS comment: 7/8/20 cardiac cath:  CONCLUSIONS:    Normal coronaries.       Neuro/Psych  (+) numbness,    GI/Hepatic/Renal/Endo    (+) obesity,   renal disease CRI and stones, diabetes mellitus type 2,     Musculoskeletal     Abdominal   (+) obese,    Substance History - negative use     OB/GYN negative ob/gyn ROS         Other   arthritis,                        Anesthesia Plan    ASA 3     general and MAC     (Patient understands anesthesia not responsible for dental damage.)  intravenous induction     Anesthetic plan, risks, benefits, and alternatives have been provided, discussed and informed consent has been obtained with: patient.

## 2022-07-19 NOTE — ANESTHESIA POSTPROCEDURE EVALUATION
Patient: Jean-Pierre Anaya    Procedure Summary     Date: 07/19/22 Room / Location: [facility] ENDOSCOPY 4 / [facility] ENDOSCOPY    Anesthesia Start: 1357 Anesthesia Stop: 1411    Procedure: ESOPHAGOGASTRODUODENOSCOPY WITH BIOPSIES (N/A ) Diagnosis:       Nausea and vomiting, unspecified vomiting type      Epigastric pain      (Nausea and vomiting, unspecified vomiting type [R11.2])      (Epigastric pain [R10.13])    Surgeons: Luis Felipe Ferrell MD Provider: Nargis Banuelos MD    Anesthesia Type: general, MAC ASA Status: 3          Anesthesia Type: general, MAC    Vitals  Vitals Value Taken Time   /71 07/19/22 1425   Temp 36.7 °C (98.1 °F) 07/19/22 1415   Pulse 73 07/19/22 1428   Resp 20 07/19/22 1425   SpO2 95 % 07/19/22 1428   Vitals shown include unvalidated device data.        Post Anesthesia Care and Evaluation    Patient location during evaluation: bedside  Patient participation: complete - patient participated  Level of consciousness: awake  Pain score: 0  Pain management: adequate    Airway patency: patent  Anesthetic complications: No anesthetic complications  PONV Status: none  Cardiovascular status: acceptable and stable  Respiratory status: acceptable and room air  Hydration status: acceptable    Comments: An Anesthesiologist personally participated in the most demanding procedures (including induction and emergence if applicable) in the anesthesia plan, monitored the course of anesthesia administration at frequent intervals and remained physically present and available for immediate diagnosis and treatment of emergencies.

## 2022-07-19 NOTE — H&P
Pre Procedure History & Physical    Chief Complaint:   Gastric pain, nausea vomiting    Subjective     HPI:   Gastric pain, nausea vomiting    Past Medical History:   Past Medical History:   Diagnosis Date   • Abnormal EKG    • Arthritis    • Asthma    • Bacteriuria 02/17/2021   • Benign essential hypertension    • Bilateral carpal tunnel syndrome 01/29/2019   • BPH (benign prostatic hyperplasia)    • Chronic kidney disease     stage 3   • Diabetes (HCC)    • Diabetes mellitus type 2, noninsulin dependent (HCC)    • ED (erectile dysfunction)    • Gout    • HLD (hyperlipidemia)    • HTN (hypertension)    • Hyperlipemia    • Hypogonadism in male    • Leg swelling    • Neuropathy    • Preop examination 01/29/2019    BILATERAL CTS WANTS TO HAVE RIGHT CTR BEFORE LEFT   • Prostate disorder    • Renal calculus or stone    • Seasonal allergies        Past Surgical History:  Past Surgical History:   Procedure Laterality Date   • CARDIAC CATHETERIZATION     • CARPAL TUNNEL RELEASE Right 01/18/2019    CTR   • CATARACT EXTRACTION, BILATERAL  11/2021   • COLONOSCOPY     • CYSTOSCOPY     • EYE SURGERY      EYE IMPLANT   • KIDNEY STONE SURGERY     • PENILE PROSTHESIS IMPLANT Bilateral 12/13/2021    Procedure: PENILE PROSTHESIS PLACEMENT;  Surgeon: Serafin Pereira MD;  Location: [facility];  Service: Urology;  Laterality: Bilateral;   • PROSTATE LASER ABLATION/ENUCLEATION     • TUMOR REMOVAL      fatty tumor- from back   • TURP / TRANSURETHRAL INCISION / DRAINAGE PROSTATE  04/26/2017       Family History:  Family History   Problem Relation Age of Onset   • Breast cancer Mother    • Osteoporosis Mother    • Arthritis Mother    • Lung cancer Father    • Kidney cancer Father    • Colon cancer Neg Hx    • Malig Hyperthermia Neg Hx        Social History:   reports that he has never smoked. He has never used smokeless tobacco. He reports that he does not drink alcohol and does not use drugs.    Medications:   Medications Prior  to Admission   Medication Sig Dispense Refill Last Dose   • allopurinol (ZYLOPRIM) 300 MG tablet Take 300 mg by mouth Daily.   7/19/2022 at Unknown time   • atorvastatin (LIPITOR) 20 MG tablet Take 20 mg by mouth Every Night.   7/18/2022 at Unknown time   • Azelastine HCl 137 MCG/SPRAY solution 1-2 sprays into the nostril(s) as directed by provider 2 (Two) Times a Day.   7/19/2022 at Unknown time   • carvedilol (COREG) 3.125 MG tablet Take 3.125 mg by mouth 2 (Two) Times a Day With Meals.   7/19/2022 at Unknown time   • Cholecalciferol 50 MCG (2000 UT) tablet Take 2,000 Units by mouth Daily.   7/18/2022 at Unknown time   • fenofibrate 160 MG tablet Take 160 mg by mouth Every Night.   7/18/2022 at Unknown time   • fexofenadine (ALLEGRA) 180 MG tablet Take 180 mg by mouth Every Night.   7/18/2022 at Unknown time   • gabapentin (NEURONTIN) 300 MG capsule Take 300 mg by mouth Every Night.   7/18/2022 at Unknown time   • Insulin Glargine, 1 Unit Dial, (Toujeo SoloStar) 300 UNIT/ML solution pen-injector injection Inject 65 Units under the skin into the appropriate area as directed Every Night.   7/18/2022 at Unknown time   • insulin lispro (humaLOG) 100 UNIT/ML injection Inject 25 Units under the skin into the appropriate area as directed 3 (Three) Times a Day Before Meals.   7/18/2022 at Unknown time   • linaclotide (Linzess) 72 MCG capsule capsule Take 1 capsule by mouth Every Morning Before Breakfast for 90 days. 90 capsule 0 Past Month at Unknown time   • montelukast (SINGULAIR) 10 MG tablet Take 10 mg by mouth Daily.   7/18/2022 at Unknown time   • Omega-3 1000 MG capsule Take 2,000 mg by mouth 2 (Two) Times a Day.   7/18/2022 at Unknown time   • pantoprazole (PROTONIX) 40 MG EC tablet Take 40 mg by mouth Daily.   7/19/2022 at Unknown time   • potassium citrate (UROCIT-K) 10 MEQ (1080 MG) CR tablet Take 20 mEq by mouth 4 (Four) Times a Day With Meals & at Bedtime. FOR KIDNEY STONES   7/18/2022 at Unknown time   •  "traMADol (ULTRAM) 50 MG tablet Take 50 mg by mouth 2 (Two) Times a Day As Needed for Moderate Pain .   7/18/2022 at Unknown time   • albuterol sulfate HFA (ProAir HFA) 108 (90 Base) MCG/ACT inhaler Inhale 2 puffs Every 4 (Four) Hours As Needed.   More than a month at Unknown time   • aspirin 81 MG EC tablet Take 81 mg by mouth Every Night. LAST DOSE 7/17/22 PM   7/17/2022   • Diclofenac Sodium (VOLTAREN) 1 % gel gel Apply 4 g topically to the appropriate area as directed 4 (Four) Times a Day As Needed (AS NEEDED). 100 g 1 More than a month at Unknown time   • fluticasone (FLONASE) 50 MCG/ACT nasal spray 1 spray into the nostril(s) as directed by provider Daily.   More than a month at Unknown time   • metroNIDAZOLE (METROCREAM) 0.75 % cream Apply 1 application topically to the appropriate area as directed As Needed.      • ondansetron ODT (ZOFRAN-ODT) 4 MG disintegrating tablet Place 4 mg on the tongue Every 8 (Eight) Hours As Needed for Nausea or Vomiting.   More than a month at Unknown time   • Semaglutide,0.25 or 0.5MG/DOS, (Ozempic, 0.25 or 0.5 MG/DOSE,) 2 MG/1.5ML solution pen-injector Inject 0.5 mg under the skin into the appropriate area as directed 1 (One) Time Per Week. Takes on fridays   7/15/2022       Allergies:  Patient has no known allergies.        Objective     Blood pressure 145/72, pulse 70, temperature 97.3 °F (36.3 °C), temperature source Temporal, resp. rate 20, height 175.3 cm (69.02\"), weight 104 kg (228 lb 6.3 oz), SpO2 97 %.    Physical Exam   Constitutional: Pt is oriented to person, place, and time and well-developed, well-nourished, and in no distress.   Mouth/Throat: Oropharynx is clear and moist.   Neck: Normal range of motion.   Cardiovascular: Normal rate, regular rhythm and normal heart sounds.    Pulmonary/Chest: Effort normal and breath sounds normal.   Abdominal: Soft. Nontender  Skin: Skin is warm and dry.   Psychiatric: Mood, memory, affect and judgment normal.     Assessment & " Plan     Diagnosis:  Gastric pain, nausea vomiting    Anticipated Surgical Procedure:  EGD    The risks, benefits, and alternatives of this procedure have been discussed with the patient or the responsible party- the patient understands and agrees to proceed.

## 2022-07-20 LAB
CYTO UR: NORMAL
LAB AP CASE REPORT: NORMAL
LAB AP CLINICAL INFORMATION: NORMAL
PATH REPORT.FINAL DX SPEC: NORMAL
PATH REPORT.GROSS SPEC: NORMAL

## 2022-07-29 ENCOUNTER — OFFICE VISIT (OUTPATIENT)
Dept: UROLOGY | Facility: CLINIC | Age: 67
End: 2022-07-29

## 2022-07-29 VITALS
BODY MASS INDEX: 33.77 KG/M2 | HEIGHT: 69 IN | WEIGHT: 228 LBS | TEMPERATURE: 98.7 F | DIASTOLIC BLOOD PRESSURE: 66 MMHG | SYSTOLIC BLOOD PRESSURE: 144 MMHG | HEART RATE: 79 BPM

## 2022-07-29 DIAGNOSIS — N40.0 BENIGN PROSTATIC HYPERPLASIA WITHOUT LOWER URINARY TRACT SYMPTOMS: Primary | ICD-10-CM

## 2022-07-29 LAB
BILIRUB BLD-MCNC: NEGATIVE MG/DL
CLARITY, POC: CLEAR
COLOR UR: YELLOW
EXPIRATION DATE: ABNORMAL
GLUCOSE UR STRIP-MCNC: NEGATIVE MG/DL
KETONES UR QL: NEGATIVE
LEUKOCYTE EST, POC: NEGATIVE
Lab: ABNORMAL
NITRITE UR-MCNC: NEGATIVE MG/ML
PH UR: 6 [PH] (ref 5–8)
PROT UR STRIP-MCNC: NEGATIVE MG/DL
RBC # UR STRIP: NEGATIVE /UL
SP GR UR: 1.03 (ref 1–1.03)
UROBILINOGEN UR QL: ABNORMAL

## 2022-07-29 PROCEDURE — 81003 URINALYSIS AUTO W/O SCOPE: CPT | Performed by: UROLOGY

## 2022-07-29 PROCEDURE — 99212 OFFICE O/P EST SF 10 MIN: CPT | Performed by: UROLOGY

## 2022-07-29 NOTE — PROGRESS NOTES
"Chief Complaint  Follow-up for BPH.    Penile implant    Subjective Patient is doing fine        Jean-Pierre Anaya presents to [facility] UROLOGY  History of Present Illness    66-year-old white male has been urinating with no difficulty except when the sits down, he cannot empty his bladder.  No dysuria or gross hematuria.  Overall AUA score is 11/35 patient is mostly satisfied.    His penile implant is working fine.    Objective No acute distress  Vital Signs:   /66   Pulse 79   Temp 98.7 °F (37.1 °C)   Ht 175.3 cm (69.02\")   Wt 103 kg (228 lb)   BMI 33.65 kg/m²     No Known Allergies   Past medical history:  has a past medical history of Abnormal EKG, Arthritis, Asthma, Bacteriuria (02/17/2021), Benign essential hypertension, Bilateral carpal tunnel syndrome (01/29/2019), BPH (benign prostatic hyperplasia), Chronic kidney disease, Diabetes (HCC), Diabetes mellitus type 2, noninsulin dependent (HCC), ED (erectile dysfunction), Gout, HLD (hyperlipidemia), HTN (hypertension), Hyperlipemia, Hypogonadism in male, Leg swelling, Neuropathy, Preop examination (01/29/2019), Prostate disorder, Renal calculus or stone, and Seasonal allergies.   Past surgical history:  has a past surgical history that includes Carpal tunnel release (Right, 01/18/2019); Colonoscopy; Cystoscopy; Eye surgery; prostate laser ablation/enucleation; Kidney stone surgery; TURP / transurethral incision / drainage prostate (04/26/2017); Cataract extraction, bilateral (11/2021); Tumor removal; Cardiac catheterization; Penile prosthesis implant (Bilateral, 12/13/2021); and Esophagogastroduodenoscopy (N/A, 7/19/2022).  Personal history: family history includes Arthritis in his mother; Breast cancer in his mother; Kidney cancer in his father; Lung cancer in his father; Osteoporosis in his mother.  Social history:  reports that he has never smoked. He has never used smokeless tobacco. He reports that he does not drink " alcohol and does not use drugs.    Review of Systems    No change from before    Physical Exam  Constitutional:       General: He is not in acute distress.     Appearance: Normal appearance. He is obese. He is not ill-appearing or toxic-appearing.   HENT:      Head: Normocephalic and atraumatic.   Abdominal:      Palpations: Abdomen is soft. There is no mass.      Tenderness: There is no abdominal tenderness. There is no right CVA tenderness or left CVA tenderness.   Genitourinary:     Comments: Normal penis with functioning penile implant    Right and left scrotum is normal.    Right and left testicles and epididymis is normal.    BRENDA.  Prostate gland is just about 30 g and benign  Musculoskeletal:         General: Normal range of motion.   Skin:     General: Skin is warm.      Coloration: Skin is not jaundiced.   Neurological:      General: No focal deficit present.      Mental Status: He is alert and oriented to person, place, and time.      Motor: No weakness.      Gait: Gait normal.   Psychiatric:         Mood and Affect: Mood normal.         Behavior: Behavior normal.         Thought Content: Thought content normal.         Judgment: Judgment normal.        Result Review :                 Assessment and Plan    Diagnoses and all orders for this visit:    1. Benign prostatic hyperplasia without lower urinary tract symptoms (Primary)  -     POC Urinalysis Dipstick, Automated      Patient is doing fine and urinating very well.  Penile implant is functioning and working.  We will recheck him in 1 years time.  This visit was to make sure his prostate is okay and penile implant is working okay  Brief Urine Lab Results  (Last result in the past 365 days)      Color   Clarity   Blood   Leuk Est   Nitrite   Protein   CREAT   Urine HCG        07/29/22 1101 Yellow   Clear   Negative   Negative   Negative   Negative                  Follow Up   No follow-ups on file.  Patient was given instructions and counseling  regarding his condition or for health maintenance advice. Please see specific information pulled into the AVS if appropriate.     Serafin Pereira MD

## 2022-08-04 ENCOUNTER — TELEPHONE (OUTPATIENT)
Dept: GASTROENTEROLOGY | Facility: CLINIC | Age: 67
End: 2022-08-04

## 2022-08-04 NOTE — TELEPHONE ENCOUNTER
S/w pt about overdue labs. Pt wished to cancel at this time because he feels better. I told pt if he needed us or had any concerns to call the office. Pt voiced understanding

## 2022-08-09 ENCOUNTER — OFFICE VISIT (OUTPATIENT)
Dept: CARDIOLOGY | Facility: CLINIC | Age: 67
End: 2022-08-09

## 2022-08-09 VITALS
DIASTOLIC BLOOD PRESSURE: 69 MMHG | HEIGHT: 69 IN | BODY MASS INDEX: 33.47 KG/M2 | HEART RATE: 78 BPM | SYSTOLIC BLOOD PRESSURE: 126 MMHG | WEIGHT: 226 LBS

## 2022-08-09 DIAGNOSIS — I10 HYPERTENSION, ESSENTIAL: Primary | ICD-10-CM

## 2022-08-09 DIAGNOSIS — E78.2 HYPERLIPEMIA, MIXED: ICD-10-CM

## 2022-08-09 PROCEDURE — 99214 OFFICE O/P EST MOD 30 MIN: CPT | Performed by: SPECIALIST

## 2022-08-09 NOTE — PROGRESS NOTES
[facility]  Cardiology progress Note    Patient Name: Jean-Pierre Anaya  : 1955    CHIEF COMPLAINT  Hypertension        Subjective   Subjective     HISTORY OF PRESENT ILLNESS    Jean-Pierre Anaya is a 67 y.o. male with history of hypertension hyperlipidemia.  No chest pain.  Blood pressure controlled at home.    REVIEW OF SYSTEMS    Constitutional:    No fever, no weight loss  Skin:     No rash  Otolaryngeal:    No difficulty swallowing  Cardiovascular:  No chest pain or shortness of breath  Pulmonary:    No cough, no sputum production    Personal History     Social History:    reports that he has never smoked. He has never used smokeless tobacco. He reports that he does not drink alcohol and does not use drugs.    Home Medications:  Current Outpatient Medications on File Prior to Visit   Medication Sig   • albuterol sulfate HFA (ProAir HFA) 108 (90 Base) MCG/ACT inhaler Inhale 2 puffs Every 4 (Four) Hours As Needed.   • allopurinol (ZYLOPRIM) 300 MG tablet Take 300 mg by mouth Daily.   • aspirin 81 MG EC tablet Take 81 mg by mouth Every Night. LAST DOSE 22 PM   • atorvastatin (LIPITOR) 20 MG tablet Take 20 mg by mouth Every Night.   • Azelastine HCl 137 MCG/SPRAY solution 1-2 sprays into the nostril(s) as directed by provider 2 (Two) Times a Day.   • carvedilol (COREG) 3.125 MG tablet Take 3.125 mg by mouth 2 (Two) Times a Day With Meals.   • Cholecalciferol 50 MCG (2000 UT) tablet Take 2,000 Units by mouth Daily.   • fenofibrate 160 MG tablet Take 160 mg by mouth Every Night.   • fexofenadine (ALLEGRA) 180 MG tablet Take 180 mg by mouth Every Night.   • gabapentin (NEURONTIN) 300 MG capsule Take 300 mg by mouth 2 (Two) Times a Day.   • Insulin Glargine, 1 Unit Dial, (Toujeo SoloStar) 300 UNIT/ML solution pen-injector injection Inject 65 Units under the skin into the appropriate area as directed Every Night.   • insulin lispro (humaLOG) 100 UNIT/ML injection Inject 25 Units under  the skin into the appropriate area as directed 3 (Three) Times a Day Before Meals.   • metroNIDAZOLE (METROCREAM) 0.75 % cream Apply 1 application topically to the appropriate area as directed As Needed.   • montelukast (SINGULAIR) 10 MG tablet Take 10 mg by mouth Daily.   • Omega-3 1000 MG capsule Take 2,000 mg by mouth 2 (Two) Times a Day.   • ondansetron ODT (ZOFRAN-ODT) 4 MG disintegrating tablet Place 4 mg on the tongue Every 8 (Eight) Hours As Needed for Nausea or Vomiting.   • pantoprazole (PROTONIX) 40 MG EC tablet Take 40 mg by mouth Daily.   • potassium citrate (UROCIT-K) 10 MEQ (1080 MG) CR tablet Take 20 mEq by mouth 4 (Four) Times a Day With Meals & at Bedtime. FOR KIDNEY STONES   • Semaglutide,0.25 or 0.5MG/DOS, (Ozempic, 0.25 or 0.5 MG/DOSE,) 2 MG/1.5ML solution pen-injector Inject 0.5 mg under the skin into the appropriate area as directed 1 (One) Time Per Week. Takes on fridays   • traMADol (ULTRAM) 50 MG tablet Take 50 mg by mouth 2 (Two) Times a Day As Needed for Moderate Pain .   • [DISCONTINUED] Diclofenac Sodium (VOLTAREN) 1 % gel gel Apply 4 g topically to the appropriate area as directed 4 (Four) Times a Day As Needed (AS NEEDED).   • [DISCONTINUED] fluticasone (FLONASE) 50 MCG/ACT nasal spray 1 spray into the nostril(s) as directed by provider Daily.   • [DISCONTINUED] linaclotide (Linzess) 72 MCG capsule capsule Take 1 capsule by mouth Every Morning Before Breakfast for 90 days.     No current facility-administered medications on file prior to visit.       Past Medical History:   Diagnosis Date   • Abnormal EKG    • Arthritis    • Asthma    • Bacteriuria 02/17/2021   • Benign essential hypertension    • Bilateral carpal tunnel syndrome 01/29/2019   • BPH (benign prostatic hyperplasia)    • Chronic kidney disease     stage 3   • Diabetes (HCC)    • Diabetes mellitus type 2, noninsulin dependent (HCC)    • ED (erectile dysfunction)    • Gout    • HLD (hyperlipidemia)    • HTN (hypertension)     • Hyperlipemia    • Hypogonadism in male    • Leg swelling    • Neuropathy    • Preop examination 01/29/2019    BILATERAL CTS WANTS TO HAVE RIGHT CTR BEFORE LEFT   • Prostate disorder    • Renal calculus or stone    • Seasonal allergies        Allergies:  No Known Allergies    Objective    Objective       Vitals:   Heart Rate:  [75-78] 78  BP: (126-150)/(69-72) 126/69  Body mass index is 33.37 kg/m².     PHYSICAL EXAM:    General Appearance:   · well developed  · well nourished  HENT:   · oropharynx moist  · lips not cyanotic  Neck:  · thyroid not enlarged  · supple  Respiratory:  · no respiratory distress  · normal breath sounds  · no rales  Cardiovascular:  · no jugular venous distention  · regular rhythm  · apical impulse normal  · S1 normal, S2 normal  · no S3, no S4   · no murmur  · no rub, no thrill  · carotid pulses normal; no bruit  · pedal pulses normal  · lower extremity edema: none    Skin:   · warm, dry  Psychiatric:  · judgement and insight appropriate  · normal mood and affect        Result Review:  I have personally reviewed the available results from  [x]  Laboratory  [x]  EKG  [x]  Cardiology  [x]  Medications  [x]  Old records  []  Other:     Procedures     Impression/Plan:  1.  Essential hypertension: Controlled.  Continue carvedilol 3.125 mg twice daily.  Blood pressure controlled at home  2.  Atypical chest pain: Normal coronaries.  No further chest pain  3.  Hyperlipidemia: Continue Lipitor 20 mg a day.        Jose David Hernández MD   08/09/22   14:02 EDT

## 2022-08-22 ENCOUNTER — TELEPHONE (OUTPATIENT)
Dept: GASTROENTEROLOGY | Facility: CLINIC | Age: 67
End: 2022-08-22

## 2022-08-22 NOTE — TELEPHONE ENCOUNTER
Pt called in regard to appointment for 08.23.22. Pt spoke with PCP and our office regarding recent EGD results and wanted to know if f/u appointment was still necessary. Spoke with Linda Messina and she suggested he come in if symptoms are persistent. He said his symptoms have weakened and he would like to cancel at this time. Will follow up with PCP when necessary.

## 2023-03-15 NOTE — PROGRESS NOTES
[facility]  Cardiology progress Note    Patient Name: Jean-Pierre Anaya  : 1955    CHIEF COMPLAINT  Hypertension        Subjective   Subjective     HISTORY OF PRESENT ILLNESS    Jean-Pierre Anaya is a 67 y.o. male history of hypertension and on hyperlipidemia.  No chest pain or shortness of breath.    REVIEW OF SYSTEMS    Constitutional:    No fever, no weight loss  Skin:     No rash  Otolaryngeal:    No difficulty swallowing  Cardiovascular: See HPI.  Pulmonary:    No cough, no sputum production    Personal History     Social History:    reports that he has never smoked. He has never used smokeless tobacco. He reports that he does not drink alcohol and does not use drugs.    Home Medications:  Current Outpatient Medications on File Prior to Visit   Medication Sig   • albuterol sulfate HFA (ProAir HFA) 108 (90 Base) MCG/ACT inhaler Inhale 2 puffs Every 4 (Four) Hours As Needed.   • allopurinol (ZYLOPRIM) 300 MG tablet Take 1 tablet by mouth Daily.   • aspirin 81 MG EC tablet Take 1 tablet by mouth Every Night. LAST DOSE 22 PM   • atorvastatin (LIPITOR) 20 MG tablet Take 1 tablet by mouth Every Night.   • Azelastine HCl 137 MCG/SPRAY solution 1-2 sprays into the nostril(s) as directed by provider 2 (Two) Times a Day.   • Cholecalciferol 50 MCG (2000 UT) tablet Take 1 tablet by mouth Daily.   • empagliflozin (Jardiance) 10 MG tablet tablet Take  by mouth Daily.   • fenofibrate 160 MG tablet Take 1 tablet by mouth Every Night.   • fexofenadine (ALLEGRA) 180 MG tablet Take 1 tablet by mouth Every Night.   • gabapentin (NEURONTIN) 300 MG capsule Take 1 capsule by mouth 2 (Two) Times a Day.   • Insulin Glargine, 1 Unit Dial, (Toujeo SoloStar) 300 UNIT/ML solution pen-injector injection Inject 65 Units under the skin into the appropriate area as directed Every Night.   • insulin lispro (humaLOG) 100 UNIT/ML injection Inject 25 Units under the skin into the appropriate area as directed 3  (Three) Times a Day Before Meals.   • metroNIDAZOLE (METROCREAM) 0.75 % cream Apply 1 application topically to the appropriate area as directed As Needed.   • montelukast (SINGULAIR) 10 MG tablet Take 1 tablet by mouth Daily.   • Omega-3 1000 MG capsule Take 2 capsules by mouth 2 (Two) Times a Day.   • pantoprazole (PROTONIX) 40 MG EC tablet Take 1 tablet by mouth Daily.   • potassium citrate (UROCIT-K) 10 MEQ (1080 MG) CR tablet Take 2 tablets by mouth 4 (Four) Times a Day With Meals & at Bedtime. FOR KIDNEY STONES   • traMADol (ULTRAM) 50 MG tablet Take 1 tablet by mouth 2 (Two) Times a Day As Needed for Moderate Pain.   • [DISCONTINUED] carvedilol (COREG) 3.125 MG tablet Take 1 tablet by mouth 2 (Two) Times a Day With Meals.   • [DISCONTINUED] ondansetron ODT (ZOFRAN-ODT) 4 MG disintegrating tablet Place 4 mg on the tongue Every 8 (Eight) Hours As Needed for Nausea or Vomiting. (Patient not taking: Reported on 3/16/2023)   • [DISCONTINUED] Semaglutide,0.25 or 0.5MG/DOS, (Ozempic, 0.25 or 0.5 MG/DOSE,) 2 MG/1.5ML solution pen-injector Inject 0.5 mg under the skin into the appropriate area as directed 1 (One) Time Per Week. Takes on fridays (Patient not taking: Reported on 3/16/2023)     No current facility-administered medications on file prior to visit.       Past Medical History:   Diagnosis Date   • Abnormal EKG    • Arthritis    • Asthma    • Bacteriuria 02/17/2021   • Benign essential hypertension    • Bilateral carpal tunnel syndrome 01/29/2019   • BPH (benign prostatic hyperplasia)    • Chronic kidney disease     stage 3   • Diabetes (HCC)    • Diabetes mellitus type 2, noninsulin dependent (HCC)    • ED (erectile dysfunction)    • Gout    • HLD (hyperlipidemia)    • HTN (hypertension)    • Hyperlipemia    • Hypogonadism in male    • Leg swelling    • Neuropathy    • Preop examination 01/29/2019    BILATERAL CTS WANTS TO HAVE RIGHT CTR BEFORE LEFT   • Prostate disorder    • Renal calculus or stone    •  Seasonal allergies        Allergies:  No Known Allergies    Objective    Objective       Vitals:   Heart Rate:  [66] 66  BP: (144)/(66) 144/66  Body mass index is 36.03 kg/m².     PHYSICAL EXAM:    General Appearance:   · well developed  · well nourished  HENT:   · oropharynx moist  · lips not cyanotic  Neck:  · thyroid not enlarged  · supple  Respiratory:  · no respiratory distress  · normal breath sounds  · no rales  Cardiovascular:  · no jugular venous distention  · regular rhythm  · apical impulse normal  · S1 normal, S2 normal  · no S3, no S4   · no murmur  · no rub, no thrill  · carotid pulses normal; no bruit  · pedal pulses normal  · lower extremity edema: none    Skin:   · warm, dry  Psychiatric:  · judgement and insight appropriate  · normal mood and affect        Result Review:  I have personally reviewed the available results from  [x]  Laboratory  [x]  EKG  [x]  Cardiology  [x]  Medications  [x]  Old records  []  Other:     Procedures    Impression/Plan:  1.  Essential hypertension controlled: Continue carvedilol 6.25 mg twice a day.  Monitor      blood pressure regularly.  2.  Hyperlipidemia: Continue Lipitor 20 mg once a day.  Monitor lipid and hepatic profile.  3.  Atypical chest pain: Normal coronaries.  4.  Occasional palpitations: Increase carvedilol to 6.25 mg twice a day.  If he has increased palpitations we will do a 24-hour Holter.        Jose David Hernández MD   03/16/23   10:49 EDT

## 2023-03-16 ENCOUNTER — OFFICE VISIT (OUTPATIENT)
Dept: CARDIOLOGY | Facility: CLINIC | Age: 68
End: 2023-03-16
Payer: MEDICARE

## 2023-03-16 VITALS
SYSTOLIC BLOOD PRESSURE: 144 MMHG | WEIGHT: 244 LBS | HEART RATE: 66 BPM | BODY MASS INDEX: 36.14 KG/M2 | DIASTOLIC BLOOD PRESSURE: 66 MMHG | HEIGHT: 69 IN

## 2023-03-16 DIAGNOSIS — I10 HYPERTENSION, ESSENTIAL: Primary | ICD-10-CM

## 2023-03-16 DIAGNOSIS — E78.2 HYPERLIPEMIA, MIXED: ICD-10-CM

## 2023-03-16 DIAGNOSIS — R00.2 PALPITATIONS: ICD-10-CM

## 2023-03-16 PROCEDURE — 1160F RVW MEDS BY RX/DR IN RCRD: CPT | Performed by: SPECIALIST

## 2023-03-16 PROCEDURE — 99214 OFFICE O/P EST MOD 30 MIN: CPT | Performed by: SPECIALIST

## 2023-03-16 PROCEDURE — 1159F MED LIST DOCD IN RCRD: CPT | Performed by: SPECIALIST

## 2023-03-16 RX ORDER — CARVEDILOL 6.25 MG/1
6.25 TABLET ORAL 2 TIMES DAILY WITH MEALS
Qty: 180 TABLET | Refills: 7 | Status: SHIPPED | OUTPATIENT
Start: 2023-03-16

## 2023-05-09 ENCOUNTER — CLINICAL SUPPORT (OUTPATIENT)
Dept: GASTROENTEROLOGY | Facility: CLINIC | Age: 68
End: 2023-05-09

## 2023-05-09 ENCOUNTER — PREP FOR SURGERY (OUTPATIENT)
Dept: OTHER | Facility: HOSPITAL | Age: 68
End: 2023-05-09
Payer: MEDICARE

## 2023-05-09 DIAGNOSIS — Z12.11 ENCOUNTER FOR SCREENING FOR MALIGNANT NEOPLASM OF COLON: Primary | ICD-10-CM

## 2023-05-09 RX ORDER — LOSARTAN POTASSIUM 25 MG/1
1 TABLET ORAL DAILY
COMMUNITY
Start: 2023-04-12

## 2023-05-09 NOTE — PROGRESS NOTES
Jean-Pierre Anaya  1955  67 y.o.    Reason for call: Screening Colonoscopy 10 yr screen    Prep prescribed: Nulyt[place]  Prep instructions reviewed with patient and sent to patient via regular mail to the home address on file    Clearance needed? CARDIAC -CHALLAPPA    The patient has been scheduled for: Colonoscopy  Procedure Date: 8/11/2023    Family history of colon cancer? No  If yes, indicate relative:     Family History   Problem Relation Age of Onset   • Breast cancer Mother    • Osteoporosis Mother    • Arthritis Mother    • Lung cancer Father    • Kidney cancer Father    • Colon cancer Neg Hx    • Malig Hyperthermia Neg Hx      Past Medical History:   Diagnosis Date   • Abnormal EKG    • Arthritis    • Asthma    • Bacteriuria 02/17/2021   • Benign essential hypertension    • Bilateral carpal tunnel syndrome 01/29/2019   • BPH (benign prostatic hyperplasia)    • Chronic kidney disease     stage 3   • Diabetes    • Diabetes mellitus type 2, noninsulin dependent    • ED (erectile dysfunction)    • Gout    • HLD (hyperlipidemia)    • HTN (hypertension)    • Hyperlipemia    • Hypogonadism in male    • Leg swelling    • Neuropathy    • Preop examination 01/29/2019    BILATERAL CTS WANTS TO HAVE RIGHT CTR BEFORE LEFT   • Prostate disorder    • Renal calculus or stone    • Seasonal allergies      No Known Allergies  Past Surgical History:   Procedure Laterality Date   • CARDIAC CATHETERIZATION     • CARPAL TUNNEL RELEASE Right 01/18/2019    CTR   • CATARACT EXTRACTION, BILATERAL  11/2021   • COLONOSCOPY  2013   • CYSTOSCOPY     • ENDOSCOPY N/A 07/19/2022    Procedure: ESOPHAGOGASTRODUODENOSCOPY WITH BIOPSIES;  Surgeon: Luis Felipe Ferrell MD;  Location: [facility] ENDOSCOPY;  Service: Gastroenterology;  Laterality: N/A;  GASTRIC INLET PATCH   • EYE SURGERY      EYE IMPLANT   • KIDNEY STONE SURGERY     • PENILE PROSTHESIS IMPLANT Bilateral 12/13/2021    Procedure: PENILE PROSTHESIS PLACEMENT;  Surgeon: Kelli  Serafin Novoa MD;  Location: [facility] MAIN OR;  Service: Urology;  Laterality: Bilateral;   • PROSTATE LASER ABLATION/ENUCLEATION     • TUMOR REMOVAL      fatty tumor- from back   • TURP / TRANSURETHRAL INCISION / DRAINAGE PROSTATE  04/26/2017     Social History     Socioeconomic History   • Marital status:    Tobacco Use   • Smoking status: Never   • Smokeless tobacco: Never   Vaping Use   • Vaping Use: Never used   Substance and Sexual Activity   • Alcohol use: Never   • Drug use: Never   • Sexual activity: Defer       Current Outpatient Medications:   •  albuterol sulfate HFA (ProAir HFA) 108 (90 Base) MCG/ACT inhaler, Inhale 2 puffs Every 4 (Four) Hours As Needed., Disp: , Rfl:   •  allopurinol (ZYLOPRIM) 300 MG tablet, Take 1 tablet by mouth Daily., Disp: , Rfl:   •  aspirin 81 MG EC tablet, Take 1 tablet by mouth Every Night. LAST DOSE 7/17/22 PM, Disp: , Rfl:   •  atorvastatin (LIPITOR) 20 MG tablet, Take 1 tablet by mouth Every Night., Disp: , Rfl:   •  Azelastine HCl 137 MCG/SPRAY solution, 1-2 sprays into the nostril(s) as directed by provider 2 (Two) Times a Day., Disp: , Rfl:   •  carvedilol (COREG) 6.25 MG tablet, Take 1 tablet by mouth 2 (Two) Times a Day With Meals., Disp: 180 tablet, Rfl: 7  •  Cholecalciferol 50 MCG (2000 UT) tablet, Take 1 tablet by mouth Daily., Disp: , Rfl:   •  empagliflozin (JARDIANCE) 10 MG tablet tablet, Take  by mouth Daily., Disp: , Rfl:   •  fenofibrate 160 MG tablet, Take 1 tablet by mouth Every Night., Disp: , Rfl:   •  fexofenadine (ALLEGRA) 180 MG tablet, Take 1 tablet by mouth Every Night., Disp: , Rfl:   •  gabapentin (NEURONTIN) 300 MG capsule, Take 1 capsule by mouth 2 (Two) Times a Day., Disp: , Rfl:   •  Insulin Glargine, 1 Unit Dial, (Toujeo SoloStar) 300 UNIT/ML solution pen-injector injection, Inject 65 Units under the skin into the appropriate area as directed Every Night., Disp: , Rfl:   •  insulin lispro (humaLOG) 100 UNIT/ML injection, Inject 25  Units under the skin into the appropriate area as directed 3 (Three) Times a Day Before Meals., Disp: , Rfl:   •  losartan (COZAAR) 25 MG tablet, Take 1 tablet by mouth Daily., Disp: , Rfl:   •  metroNIDAZOLE (METROCREAM) 0.75 % cream, Apply 1 application topically to the appropriate area as directed As Needed., Disp: , Rfl:   •  montelukast (SINGULAIR) 10 MG tablet, Take 1 tablet by mouth Daily., Disp: , Rfl:   •  Omega-3 1000 MG capsule, Take 2 capsules by mouth 2 (Two) Times a Day., Disp: , Rfl:   •  pantoprazole (PROTONIX) 40 MG EC tablet, Take 1 tablet by mouth Daily., Disp: , Rfl:   •  potassium citrate (UROCIT-K) 10 MEQ (1080 MG) CR tablet, Take 2 tablets by mouth 4 (Four) Times a Day With Meals & at Bedtime. FOR KIDNEY STONES, Disp: , Rfl:   •  traMADol (ULTRAM) 50 MG tablet, Take 1 tablet by mouth 2 (Two) Times a Day As Needed for Moderate Pain., Disp: , Rfl:

## 2023-05-26 ENCOUNTER — TELEPHONE (OUTPATIENT)
Dept: GASTROENTEROLOGY | Facility: CLINIC | Age: 68
End: 2023-05-26
Payer: MEDICARE

## 2023-06-12 ENCOUNTER — TELEPHONE (OUTPATIENT)
Dept: CARDIOLOGY | Facility: CLINIC | Age: 68
End: 2023-06-12
Payer: MEDICARE

## 2023-06-12 NOTE — TELEPHONE ENCOUNTER
Procedure: Colonoscopy and/or EGD     Med Directive: NA     PMH: HTN, HLD     Last Seen: 3/16/23

## 2023-07-28 ENCOUNTER — OFFICE VISIT (OUTPATIENT)
Dept: UROLOGY | Facility: CLINIC | Age: 68
End: 2023-07-28
Payer: MEDICARE

## 2023-07-28 VITALS
BODY MASS INDEX: 35.84 KG/M2 | DIASTOLIC BLOOD PRESSURE: 58 MMHG | TEMPERATURE: 98.4 F | HEIGHT: 69 IN | HEART RATE: 74 BPM | SYSTOLIC BLOOD PRESSURE: 129 MMHG | WEIGHT: 242 LBS

## 2023-07-28 DIAGNOSIS — N20.0 KIDNEY STONE: ICD-10-CM

## 2023-07-28 DIAGNOSIS — N40.0 BENIGN PROSTATIC HYPERPLASIA WITHOUT LOWER URINARY TRACT SYMPTOMS: Primary | ICD-10-CM

## 2023-07-28 DIAGNOSIS — N52.01 ERECTILE DYSFUNCTION DUE TO ARTERIAL INSUFFICIENCY: ICD-10-CM

## 2023-07-28 LAB
BILIRUB BLD-MCNC: NEGATIVE MG/DL
CLARITY, POC: CLEAR
COLOR UR: YELLOW
EXPIRATION DATE: ABNORMAL
GLUCOSE UR STRIP-MCNC: ABNORMAL MG/DL
KETONES UR QL: NEGATIVE
LEUKOCYTE EST, POC: NEGATIVE
Lab: ABNORMAL
NITRITE UR-MCNC: NEGATIVE MG/ML
PH UR: 5.5 [PH] (ref 5–8)
PROT UR STRIP-MCNC: NEGATIVE MG/DL
PSA SERPL-MCNC: 0.58 NG/ML (ref 0–4)
RBC # UR STRIP: NEGATIVE /UL
SP GR UR: 1.02 (ref 1–1.03)
UROBILINOGEN UR QL: ABNORMAL

## 2023-07-28 PROCEDURE — 84153 ASSAY OF PSA TOTAL: CPT | Performed by: UROLOGY

## 2023-07-28 RX ORDER — EMPAGLIFLOZIN 25 MG/1
TABLET, FILM COATED ORAL
COMMUNITY
Start: 2023-06-21

## 2023-07-28 RX ORDER — INSULIN LISPRO 100 [IU]/ML
INJECTION, SOLUTION INTRAVENOUS; SUBCUTANEOUS
COMMUNITY
Start: 2023-05-15

## 2023-07-28 NOTE — PROGRESS NOTES
"Chief Complaint  Benign Prostatic Hypertrophy  Check for PSA  ED patient has a penile implant    Subjective no acute distress        Jean-Pierre Anaya presents to [facility] UROLOGY  History of Present Illness  67-year-old white male is here for follow-up for BPH which is a chronic obstructive prostatic disease which can give rise to detrusor hypertrophy and even renal failure.  Patient gets up only once at nighttime and does have urgency.  Patient is diabetic and is on Jardiance which is giving him frequency and urgency.  He has no dysuria or gross hematuria.  He has no flank pain similar to renal colic.    Patient has passed 2 stones last year without any difficulties.  He continues to take Urocit-K 20 mEq twice a day.  Dr. Wilson is following him for that and checking his potassium.    Patient had ED but had penile implant which is working fine.      Medication PSA on 8/9/2021 is 0.540    Objective no acute distress  Vital Signs:   /58   Pulse 74   Temp 98.4 °F (36.9 °C) (Infrared)   Ht 175.3 cm (69\")   Wt 110 kg (242 lb)   BMI 35.74 kg/m²     No Known Allergies   Past medical history:  has a past medical history of Abnormal EKG, Arthritis, Asthma, Bacteriuria (02/17/2021), Benign essential hypertension, Bilateral carpal tunnel syndrome (01/29/2019), BPH (benign prostatic hyperplasia), Chronic kidney disease, Diabetes, Diabetes mellitus type 2, noninsulin dependent, ED (erectile dysfunction), HLD (hyperlipidemia), HTN (hypertension), Hyperlipemia, Hypogonadism in male, Leg swelling, Neuropathy, Preop examination (01/29/2019), Prostate disorder, Renal calculus or stone, and Seasonal allergies.   Past surgical history:  has a past surgical history that includes Carpal tunnel release (Right, 01/18/2019); Colonoscopy (2013); Cystoscopy; Eye surgery; prostate laser ablation/enucleation; Kidney stone surgery; TURP / transurethral incision / drainage prostate (04/26/2017); Cataract " extraction, bilateral (11/2021); Tumor removal; Cardiac catheterization; Penile prosthesis implant (Bilateral, 12/13/2021); and Esophagogastroduodenoscopy (N/A, 07/19/2022).  Personal history: family history includes Arthritis in his mother; Breast cancer in his mother; Kidney cancer in his father; Lung cancer in his father; Osteoporosis in his mother.  Social history:  reports that he has never smoked. He has never used smokeless tobacco. He reports that he does not drink alcohol and does not use drugs.    Review of Systems    Please see past medical surgical history rest of the system is negative    Physical Exam  Constitutional:       General: He is not in acute distress.     Appearance: Normal appearance. He is obese. He is not ill-appearing or toxic-appearing.   HENT:      Head: Normocephalic and atraumatic.      Ears:      Comments: No loss of hearing  Cardiovascular:      Rate and Rhythm: Normal rate and regular rhythm.      Pulses: Normal pulses.      Heart sounds: Normal heart sounds. No murmur heard.  Pulmonary:      Effort: Pulmonary effort is normal. No respiratory distress.      Breath sounds: Normal breath sounds. No rhonchi or rales.   Abdominal:      Palpations: Abdomen is soft. There is no mass.      Tenderness: There is no abdominal tenderness. There is no right CVA tenderness or left CVA tenderness.      Hernia: No hernia is present.   Genitourinary:     Comments: Normal circumcised penis.  Patient has penile implant.    Right and left scrotum is okay    Right left testicle and epididymis is okay    BRENDA.  Prostate gland is well resected and benign  Musculoskeletal:         General: No swelling. Normal range of motion.      Cervical back: Normal range of motion and neck supple. No rigidity or tenderness.   Lymphadenopathy:      Cervical: No cervical adenopathy.   Skin:     Coloration: Skin is not jaundiced.   Neurological:      General: No focal deficit present.      Mental Status: He is alert and  oriented to person, place, and time.      Motor: No weakness.      Gait: Gait normal.   Psychiatric:         Mood and Affect: Mood normal.         Behavior: Behavior normal.         Thought Content: Thought content normal.         Judgment: Judgment normal.      Result Review :                 Assessment and Plan    Diagnoses and all orders for this visit:    1. Benign prostatic hyperplasia without lower urinary tract symptoms (Primary)  -     POC Urinalysis Dipstick, Automated    2. Kidney stone    3. Erectile dysfunction due to arterial insufficiency    We will do PSA just to be on the safe side.  I will recheck him in 1 years time.     Brief Urine Lab Results  (Last result in the past 365 days)        Color   Clarity   Blood   Leuk Est   Nitrite   Protein   CREAT   Urine HCG        07/28/23 1421 Yellow   Clear   Negative   Negative   Negative   Negative                    Follow Up   No follow-ups on file.  Patient was given instructions and counseling regarding his condition or for health maintenance advice. Please see specific information pulled into the AVS if appropriate.     Serafin Pereira MD

## 2023-08-09 ENCOUNTER — ANESTHESIA EVENT (OUTPATIENT)
Dept: GASTROENTEROLOGY | Facility: HOSPITAL | Age: 68
End: 2023-08-09
Payer: MEDICARE

## 2023-08-11 ENCOUNTER — HOSPITAL ENCOUNTER (OUTPATIENT)
Facility: HOSPITAL | Age: 68
Setting detail: HOSPITAL OUTPATIENT SURGERY
Discharge: HOME | End: 2023-08-11
Attending: INTERNAL MEDICINE | Admitting: INTERNAL MEDICINE
Payer: MEDICARE

## 2023-08-11 ENCOUNTER — ANESTHESIA (OUTPATIENT)
Dept: GASTROENTEROLOGY | Facility: HOSPITAL | Age: 68
End: 2023-08-11
Payer: MEDICARE

## 2023-08-11 VITALS
TEMPERATURE: 97.5 F | WEIGHT: 243.83 LBS | DIASTOLIC BLOOD PRESSURE: 66 MMHG | OXYGEN SATURATION: 94 % | SYSTOLIC BLOOD PRESSURE: 137 MMHG | HEART RATE: 71 BPM | RESPIRATION RATE: 19 BRPM | HEIGHT: 69 IN | BODY MASS INDEX: 36.11 KG/M2

## 2023-08-11 DIAGNOSIS — Z12.11 ENCOUNTER FOR SCREENING FOR MALIGNANT NEOPLASM OF COLON: ICD-10-CM

## 2023-08-11 LAB — GLUCOSE BLDC GLUCOMTR-MCNC: 93 MG/DL (ref 70–99)

## 2023-08-11 PROCEDURE — 25010000002 PROPOFOL 10 MG/ML EMULSION

## 2023-08-11 PROCEDURE — 82948 REAGENT STRIP/BLOOD GLUCOSE: CPT

## 2023-08-11 PROCEDURE — 88305 TISSUE EXAM BY PATHOLOGIST: CPT | Performed by: INTERNAL MEDICINE

## 2023-08-11 PROCEDURE — 45385 COLONOSCOPY W/LESION REMOVAL: CPT | Performed by: INTERNAL MEDICINE

## 2023-08-11 RX ORDER — PROPOFOL 10 MG/ML
VIAL (ML) INTRAVENOUS AS NEEDED
Status: DISCONTINUED | OUTPATIENT
Start: 2023-08-11 | End: 2023-08-11 | Stop reason: SURG

## 2023-08-11 RX ORDER — LIDOCAINE HYDROCHLORIDE 20 MG/ML
INJECTION, SOLUTION EPIDURAL; INFILTRATION; INTRACAUDAL; PERINEURAL AS NEEDED
Status: DISCONTINUED | OUTPATIENT
Start: 2023-08-11 | End: 2023-08-11 | Stop reason: SURG

## 2023-08-11 RX ORDER — SODIUM CHLORIDE, SODIUM LACTATE, POTASSIUM CHLORIDE, CALCIUM CHLORIDE 600; 310; 30; 20 MG/100ML; MG/100ML; MG/100ML; MG/100ML
30 INJECTION, SOLUTION INTRAVENOUS CONTINUOUS
Status: DISCONTINUED | OUTPATIENT
Start: 2023-08-11 | End: 2023-08-11 | Stop reason: HOSPADM

## 2023-08-11 RX ADMIN — PROPOFOL 90 MG: 10 INJECTION, EMULSION INTRAVENOUS at 10:19

## 2023-08-11 RX ADMIN — LIDOCAINE HYDROCHLORIDE 40 MG: 20 INJECTION, SOLUTION EPIDURAL; INFILTRATION; INTRACAUDAL; PERINEURAL at 10:19

## 2023-08-11 RX ADMIN — SODIUM CHLORIDE, POTASSIUM CHLORIDE, SODIUM LACTATE AND CALCIUM CHLORIDE 30 ML/HR: 600; 310; 30; 20 INJECTION, SOLUTION INTRAVENOUS at 09:18

## 2023-08-11 RX ADMIN — PROPOFOL 150 MCG/KG/MIN: 10 INJECTION, EMULSION INTRAVENOUS at 10:19

## 2023-08-11 NOTE — ANESTHESIA POSTPROCEDURE EVALUATION
Patient: Jean-Pierre Anaya    Procedure Summary       Date: 08/11/23 Room / Location: [facility] ENDOSCOPY 4 / [facility] ENDOSCOPY    Anesthesia Start: 1016 Anesthesia Stop: 1053    Procedure: COLONOSCOPY WITH POLYPECTOMY Diagnosis:       Encounter for screening for malignant neoplasm of colon      (Encounter for screening for malignant neoplasm of colon [Z12.11])    Surgeons: Luis Felipe Ferrell MD Provider: Reyes, Mirabelle, DO    Anesthesia Type: general ASA Status: 3            Anesthesia Type: general    Vitals  Vitals Value Taken Time   /61 08/11/23 1102   Temp 36.4 øC (97.5 øF) 08/11/23 1052   Pulse 69 08/11/23 1107   Resp 17 08/11/23 1057   SpO2 93 % 08/11/23 1107   Vitals shown include unvalidated device data.        Post Anesthesia Care and Evaluation    Patient location during evaluation: bedside  Patient participation: complete - patient participated  Level of consciousness: awake  Pain management: adequate    Airway patency: patent  Anesthetic complications: anesthesia complication (corneal abrasion bilateral)  PONV Status: none  Cardiovascular status: acceptable and stable  Respiratory status: acceptable  Hydration status: acceptable    Comments: An Anesthesiologist personally participated in the most demanding procedures (including induction and emergence if applicable) in the anesthesia plan, monitored the course of anesthesia administration at frequent intervals and remained physically present and available for immediate diagnosis and treatment of emergencies.       C/o irritation of both eyes--CRNA reports patient sleeps with eyes slightly open--likely corneal abrasion.  Discussed eye gtts at home, do not rub eyes, corneal eye rest for 24h.

## 2023-08-11 NOTE — ANESTHESIA PREPROCEDURE EVALUATION
Anesthesia Evaluation     Patient summary reviewed and Nursing notes reviewed   no history of anesthetic complications:   NPO Solid Status: > 8 hours  NPO Liquid Status: > 2 hours           Airway   Mallampati: II  TM distance: >3 FB  Neck ROM: full  No difficulty expected  Dental - normal exam     Pulmonary - normal exam    breath sounds clear to auscultation  (+) asthma (mild),  Cardiovascular - normal exam  Exercise tolerance: good (4-7 METS)    Patient on routine beta blocker and Beta blocker given within 24 hours of surgery  Rhythm: regular  Rate: normal    (+) hypertension well controlled 2 medications or greaterhyperlipidemia      Neuro/Psych- negative ROS  GI/Hepatic/Renal/Endo    (+) obesity, renal disease stones and CRI, diabetes mellitus type 2    Musculoskeletal     Abdominal    Substance History - negative use     OB/GYN negative ob/gyn ROS         Other   arthritis,     ROS/Med Hx Other: PAT Nursing Notes unavailable.                   Anesthesia Plan    ASA 3     general     (Patient understands anesthesia not responsible for dental damage.)  intravenous induction     Anesthetic plan, risks, benefits, and alternatives have been provided, discussed and informed consent has been obtained with: patient.  Pre-procedure education provided  Use of blood products discussed with patient  Consented to blood products.    Plan discussed with CRNA.      CODE STATUS:

## 2023-08-11 NOTE — H&P
Pre Procedure History & Physical    Chief Complaint:   Colon cancer screening    Subjective     HPI:   Bleeding    Past Medical History:   Past Medical History:   Diagnosis Date    Abnormal EKG     Arthritis     Asthma     Bacteriuria 02/17/2021    Benign essential hypertension     Bilateral carpal tunnel syndrome 01/29/2019    BPH (benign prostatic hyperplasia)     Chronic kidney disease     stage 3    Diabetes     Diabetes mellitus type 2, noninsulin dependent     ED (erectile dysfunction)     HLD (hyperlipidemia)     HTN (hypertension)     Hyperlipemia     Hypogonadism in male     Leg swelling     Neuropathy     Preop examination 01/29/2019    BILATERAL CTS WANTS TO HAVE RIGHT CTR BEFORE LEFT    Prostate disorder     Renal calculus or stone     Seasonal allergies        Past Surgical History:  Past Surgical History:   Procedure Laterality Date    CARDIAC CATHETERIZATION      CARPAL TUNNEL RELEASE Right 01/18/2019    CTR    CATARACT EXTRACTION, BILATERAL  11/2021    COLONOSCOPY  2013    CYSTOSCOPY      ENDOSCOPY N/A 07/19/2022    Procedure: ESOPHAGOGASTRODUODENOSCOPY WITH BIOPSIES;  Surgeon: Luis Felipe Ferrell MD;  Location: [facility] ENDOSCOPY;  Service: Gastroenterology;  Laterality: N/A;  GASTRIC INLET PATCH    EYE SURGERY      EYE IMPLANT    KIDNEY STONE SURGERY      PENILE PROSTHESIS IMPLANT Bilateral 12/13/2021    Procedure: PENILE PROSTHESIS PLACEMENT;  Surgeon: Serafin Pereira MD;  Location: [facility] MAIN OR;  Service: Urology;  Laterality: Bilateral;    PROSTATE LASER ABLATION/ENUCLEATION      TUMOR REMOVAL      fatty tumor- from back    TURP / TRANSURETHRAL INCISION / DRAINAGE PROSTATE  04/26/2017       Family History:  Family History   Problem Relation Age of Onset    Breast cancer Mother     Osteoporosis Mother     Arthritis Mother     Lung cancer Father     Kidney cancer Father     Colon cancer Neg Hx     Malig Hyperthermia Neg Hx        Social History:   reports that he has never smoked. He has  never used smokeless tobacco. He reports that he does not drink alcohol and does not use drugs.    Medications:   Medications Prior to Admission   Medication Sig Dispense Refill Last Dose    albuterol sulfate HFA (ProAir HFA) 108 (90 Base) MCG/ACT inhaler Inhale 2 puffs Every 4 (Four) Hours As Needed.       allopurinol (ZYLOPRIM) 300 MG tablet Take 1 tablet by mouth Daily.       aspirin 81 MG EC tablet Take 1 tablet by mouth Every Night. LAST DOSE 7/17/22 PM       atorvastatin (LIPITOR) 20 MG tablet Take 1 tablet by mouth Every Night.       Azelastine HCl 137 MCG/SPRAY solution 1-2 sprays into the nostril(s) as directed by provider 2 (Two) Times a Day.       carvedilol (COREG) 6.25 MG tablet Take 1 tablet by mouth 2 (Two) Times a Day With Meals. 180 tablet 7     fenofibrate 160 MG tablet Take 1 tablet by mouth Every Night.       fexofenadine (ALLEGRA) 180 MG tablet Take 1 tablet by mouth Every Night.       gabapentin (NEURONTIN) 300 MG capsule Take 1 capsule by mouth 2 (Two) Times a Day.       HumaLOG KwikPen 100 UNIT/ML solution pen-injector INJECT 25 UNITS SUBCUTANEOUSLY THREE TIMES A DAY       Insulin Glargine, 1 Unit Dial, (Toujeo SoloStar) 300 UNIT/ML solution pen-injector injection Inject 65 Units under the skin into the appropriate area as directed Every Night.       Jardiance 25 MG tablet tablet TAKE ONE TABLET BY MOUTH ONCE EVERY MORNING       losartan (COZAAR) 25 MG tablet Take 1 tablet by mouth Daily.       metroNIDAZOLE (METROCREAM) 0.75 % cream Apply 1 application  topically to the appropriate area as directed As Needed.       montelukast (SINGULAIR) 10 MG tablet Take 1 tablet by mouth Daily.       Omega-3 1000 MG capsule Take 2 capsules by mouth 2 (Two) Times a Day.       polyethylene glycol (GoLYTELY) 236 g solution TAKE AS DIRECTED BY YOUR PROVIDER 4000 mL 0     potassium citrate (UROCIT-K) 10 MEQ (1080 MG) CR tablet Take 2 tablets by mouth 4 (Four) Times a Day With Meals & at Bedtime. FOR KIDNEY  STONES       traMADol (ULTRAM) 50 MG tablet Take 1 tablet by mouth 2 (Two) Times a Day As Needed for Moderate Pain.          Allergies:  Patient has no known allergies.        Objective     Weight 111 kg (243 lb 13.3 oz).    Physical Exam   Constitutional: Pt is oriented to person, place, and time and well-developed, well-nourished, and in no distress.   Mouth/Throat: Oropharynx is clear and moist.   Neck: Normal range of motion.   Cardiovascular: Normal rate, regular rhythm and normal heart sounds.    Pulmonary/Chest: Effort normal and breath sounds normal.   Abdominal: Soft. Nontender  Skin: Skin is warm and dry.   Psychiatric: Mood, memory, affect and judgment normal.     Assessment & Plan     Diagnosis:  Colon cancer screening    Anticipated Surgical Procedure:  Colonoscopy    The risks, benefits, and alternatives of this procedure have been discussed with the patient or the responsible party- the patient understands and agrees to proceed.

## 2023-10-15 NOTE — PROGRESS NOTES
[facility]  Cardiology progress Note    Patient Name: Jean-Pierre Anaya  : 1955    CHIEF COMPLAINT  Hypertension        Subjective   Subjective     HISTORY OF PRESENT ILLNESS    Jean-Pierre Anaya is a 68 y.o. male history of hypertension and palpitations.  No further palpitations.    REVIEW OF SYSTEMS    Constitutional:    No fever, no weight loss  Skin:     No rash  Otolaryngeal:    No difficulty swallowing  Cardiovascular: See HPI.  Pulmonary:    No cough, no sputum production    Personal History     Social History:    reports that he has never smoked. He has never used smokeless tobacco. He reports that he does not drink alcohol and does not use drugs.    Home Medications:  Current Outpatient Medications on File Prior to Visit   Medication Sig    albuterol sulfate HFA (ProAir HFA) 108 (90 Base) MCG/ACT inhaler Inhale 2 puffs Every 4 (Four) Hours As Needed.    allopurinol (ZYLOPRIM) 300 MG tablet Take 1 tablet by mouth Daily.    aspirin 81 MG EC tablet Take 1 tablet by mouth Every Night. LAST DOSE 22 PM    atorvastatin (LIPITOR) 20 MG tablet Take 1 tablet by mouth Every Night.    Azelastine HCl 137 MCG/SPRAY solution 1-2 sprays into the nostril(s) as directed by provider 2 (Two) Times a Day.    carvedilol (COREG) 6.25 MG tablet Take 1 tablet by mouth 2 (Two) Times a Day With Meals.    fenofibrate 160 MG tablet Take 1 tablet by mouth Every Night.    fexofenadine (ALLEGRA) 180 MG tablet Take 1 tablet by mouth Every Night.    gabapentin (NEURONTIN) 300 MG capsule Take 1 capsule by mouth 2 (Two) Times a Day.    HumaLOG KwikPen 100 UNIT/ML solution pen-injector INJECT 25 UNITS SUBCUTANEOUSLY THREE TIMES A DAY    Insulin Glargine, 1 Unit Dial, (Toujeo SoloStar) 300 UNIT/ML solution pen-injector injection Inject 65 Units under the skin into the appropriate area as directed Every Night.    Jardiance 25 MG tablet tablet TAKE ONE TABLET BY MOUTH ONCE EVERY MORNING    metroNIDAZOLE  (METROCREAM) 0.75 % cream Apply 1 application  topically to the appropriate area as directed As Needed.    montelukast (SINGULAIR) 10 MG tablet Take 1 tablet by mouth Daily.    Omega-3 1000 MG capsule Take 2 capsules by mouth 2 (Two) Times a Day.    potassium citrate (UROCIT-K) 10 MEQ (1080 MG) CR tablet Take 2 tablets by mouth 4 (Four) Times a Day With Meals & at Bedtime. FOR KIDNEY STONES    traMADol (ULTRAM) 50 MG tablet Take 1 tablet by mouth 2 (Two) Times a Day As Needed for Moderate Pain.    [DISCONTINUED] losartan (COZAAR) 25 MG tablet Take 1 tablet by mouth Daily. (Patient not taking: Reported on 10/17/2023)     No current facility-administered medications on file prior to visit.       Past Medical History:   Diagnosis Date    Abnormal EKG     Arthritis     Asthma     Bacteriuria 02/17/2021    Benign essential hypertension     Bilateral carpal tunnel syndrome 01/29/2019    BPH (benign prostatic hyperplasia)     Chronic kidney disease     stage 3    Diabetes     Diabetes mellitus type 2, noninsulin dependent     ED (erectile dysfunction)     HLD (hyperlipidemia)     HTN (hypertension)     Hyperlipemia     Hypogonadism in male     Leg swelling     Neuropathy     Preop examination 01/29/2019    BILATERAL CTS WANTS TO HAVE RIGHT CTR BEFORE LEFT    Prostate disorder     Renal calculus or stone     Seasonal allergies        Allergies:  No Known Allergies    Objective    Objective       Vitals:   Heart Rate:  [64] 64  BP: (151)/(68) 151/68  Body mass index is 34.7 kg/m².     PHYSICAL EXAM:    General Appearance:   well developed  well nourished  HENT:   oropharynx moist  lips not cyanotic  Neck:  thyroid not enlarged  supple  Respiratory:  no respiratory distress  normal breath sounds  no rales  Cardiovascular:  no jugular venous distention  regular rhythm  apical impulse normal  S1 normal, S2 normal  no S3, no S4   no murmur  no rub, no thrill  carotid pulses normal; no bruit  pedal pulses normal  lower extremity  edema: none    Skin:   warm, dry  Psychiatric:  judgement and insight appropriate  normal mood and affect        Result Review:  I have personally reviewed the available results from  [x]  Laboratory  [x]  EKG  [x]  Cardiology  [x]  Medications  [x]  Old records  []  Other:     Procedures       Impression/Plan:  1.  Palpitations stable: Continue carvedilol 6.25 mg twice a day.  2.  Atypical chest pain: Normal coronaries.  3.  Essential hypertension controlled: Continue carvedilol 6.25 mg twice a day.  Monitor blood pressure regularly.  4.  Hyperlipidemia: Continue Lipitor 20 mg once a day.  Continue fenofibrate 160 mg once a day.  Monitor lipid and hepatic profile.           Jose David Hernández MD   10/17/23   12:46 EDT    Detail Level: Detailed

## 2023-10-17 ENCOUNTER — OFFICE VISIT (OUTPATIENT)
Dept: CARDIOLOGY | Facility: CLINIC | Age: 68
End: 2023-10-17
Payer: MEDICARE

## 2023-10-17 VITALS
BODY MASS INDEX: 34.8 KG/M2 | HEART RATE: 64 BPM | WEIGHT: 235 LBS | SYSTOLIC BLOOD PRESSURE: 151 MMHG | DIASTOLIC BLOOD PRESSURE: 68 MMHG | HEIGHT: 69 IN

## 2023-10-17 DIAGNOSIS — E78.2 HYPERLIPEMIA, MIXED: ICD-10-CM

## 2023-10-17 DIAGNOSIS — I10 HYPERTENSION, ESSENTIAL: Primary | ICD-10-CM

## 2023-10-17 DIAGNOSIS — R00.2 PALPITATIONS: ICD-10-CM

## 2023-10-17 PROCEDURE — 99214 OFFICE O/P EST MOD 30 MIN: CPT | Performed by: SPECIALIST

## 2023-10-17 PROCEDURE — 1160F RVW MEDS BY RX/DR IN RCRD: CPT | Performed by: SPECIALIST

## 2023-10-17 PROCEDURE — 1159F MED LIST DOCD IN RCRD: CPT | Performed by: SPECIALIST

## 2023-10-17 RX ORDER — LOSARTAN POTASSIUM 25 MG/1
25 TABLET ORAL DAILY
Qty: 90 TABLET | Refills: 3 | Status: SHIPPED | OUTPATIENT
Start: 2023-10-17

## 2024-08-05 ENCOUNTER — OFFICE VISIT (OUTPATIENT)
Dept: UROLOGY | Facility: CLINIC | Age: 69
End: 2024-08-05
Payer: MEDICARE

## 2024-08-05 VITALS
TEMPERATURE: 98.2 F | HEART RATE: 64 BPM | DIASTOLIC BLOOD PRESSURE: 55 MMHG | BODY MASS INDEX: 31.84 KG/M2 | SYSTOLIC BLOOD PRESSURE: 123 MMHG | WEIGHT: 215 LBS | HEIGHT: 69 IN

## 2024-08-05 DIAGNOSIS — N40.0 BPH WITHOUT OBSTRUCTION/LOWER URINARY TRACT SYMPTOMS: Primary | ICD-10-CM

## 2024-08-05 DIAGNOSIS — N52.9 IMPOTENCE DUE TO ERECTILE DYSFUNCTION: ICD-10-CM

## 2024-08-05 DIAGNOSIS — Z87.442 HISTORY OF KIDNEY STONES: ICD-10-CM

## 2024-08-05 LAB
ALBUMIN SERPL-MCNC: 4 G/DL (ref 3.5–5.2)
ALBUMIN/GLOB SERPL: 1.7 G/DL
ALP SERPL-CCNC: 86 U/L (ref 39–117)
ALT SERPL W P-5'-P-CCNC: 27 U/L (ref 1–41)
ANION GAP SERPL CALCULATED.3IONS-SCNC: 6.7 MMOL/L (ref 5–15)
AST SERPL-CCNC: 22 U/L (ref 1–40)
BILIRUB BLD-MCNC: NEGATIVE MG/DL
BILIRUB SERPL-MCNC: 0.3 MG/DL (ref 0–1.2)
BUN SERPL-MCNC: 23 MG/DL (ref 8–23)
BUN/CREAT SERPL: 16.7 (ref 7–25)
CALCIUM SPEC-SCNC: 9.6 MG/DL (ref 8.6–10.5)
CHLORIDE SERPL-SCNC: 111 MMOL/L (ref 98–107)
CLARITY, POC: ABNORMAL
CO2 SERPL-SCNC: 25.3 MMOL/L (ref 22–29)
COLOR UR: YELLOW
CREAT SERPL-MCNC: 1.38 MG/DL (ref 0.76–1.27)
EGFRCR SERPLBLD CKD-EPI 2021: 55 ML/MIN/1.73
EXPIRATION DATE: ABNORMAL
GLOBULIN UR ELPH-MCNC: 2.3 GM/DL
GLUCOSE SERPL-MCNC: 115 MG/DL (ref 65–99)
GLUCOSE UR STRIP-MCNC: ABNORMAL MG/DL
KETONES UR QL: NEGATIVE
LEUKOCYTE EST, POC: NEGATIVE
Lab: ABNORMAL
NITRITE UR-MCNC: NEGATIVE MG/ML
PH UR: 7.5 [PH] (ref 5–8)
POTASSIUM SERPL-SCNC: 4.6 MMOL/L (ref 3.5–5.2)
PROT SERPL-MCNC: 6.3 G/DL (ref 6–8.5)
PROT UR STRIP-MCNC: NEGATIVE MG/DL
PSA SERPL-MCNC: 0.62 NG/ML (ref 0–4)
RBC # UR STRIP: NEGATIVE /UL
SODIUM SERPL-SCNC: 143 MMOL/L (ref 136–145)
SP GR UR: 1.02 (ref 1–1.03)
UROBILINOGEN UR QL: ABNORMAL

## 2024-08-05 PROCEDURE — 84153 ASSAY OF PSA TOTAL: CPT | Performed by: UROLOGY

## 2024-08-05 PROCEDURE — 1159F MED LIST DOCD IN RCRD: CPT | Performed by: UROLOGY

## 2024-08-05 PROCEDURE — 99213 OFFICE O/P EST LOW 20 MIN: CPT | Performed by: UROLOGY

## 2024-08-05 PROCEDURE — 1160F RVW MEDS BY RX/DR IN RCRD: CPT | Performed by: UROLOGY

## 2024-08-05 PROCEDURE — 80053 COMPREHEN METABOLIC PANEL: CPT | Performed by: UROLOGY

## 2024-08-05 PROCEDURE — 81003 URINALYSIS AUTO W/O SCOPE: CPT | Performed by: UROLOGY

## 2024-08-05 RX ORDER — TIRZEPATIDE 10 MG/.5ML
INJECTION, SOLUTION SUBCUTANEOUS
COMMUNITY
Start: 2024-07-29

## 2024-08-05 NOTE — PROGRESS NOTES
"Chief Complaint  Benign prostatic hyperplasia without lower urinary tract sy (LAST PSA 7/28/23 was 0.585)    History of kidney stones.  Patient is on prophylactic Urocit-K 20 mEq 3 times a day and allopurinol 300 mg a day.    ED.  Patient has a penile implant    Subjective no acute distress        Jean-Pierre Anaya presents to [facility] UROLOGY  History of Present Illness    68-year-old white male is here for evaluation of his prostate gland.  Patient had TUR prostate done about 4- 5 years ago and is urinating without any difficulties.  Patient has no dysuria or gross hematuria.  No family history of prostate cancer.PSA on 7/28/2023 was 0.585.    Patient has had renal calculi.  He is on prophylactic Urocit-K and allopurinol as above and has not had any more renal calculi.    Patient had erectile dysfunction secondary to diabetes mellitus and has penile implant which is working fine.    Objective no acute distress  Vital Signs:   /55 (BP Location: Left arm, Patient Position: Sitting, Cuff Size: Adult)   Pulse 64   Temp 98.2 °F (36.8 °C) (Temporal)   Ht 175.3 cm (69.02\")   Wt 97.5 kg (215 lb)   BMI 31.74 kg/m²     No Known Allergies   Past medical history:  has a past medical history of Abnormal EKG, Arthritis, Asthma, Bacteriuria (02/17/2021), Benign essential hypertension, Bilateral carpal tunnel syndrome (01/29/2019), BPH (benign prostatic hyperplasia), Chronic kidney disease, Diabetes, Diabetes mellitus type 2, noninsulin dependent, ED (erectile dysfunction), HLD (hyperlipidemia), HTN (hypertension), Hyperlipemia, Hypogonadism in male, Leg swelling, Neuropathy, Preop examination (01/29/2019), Prostate disorder, Renal calculus or stone, and Seasonal allergies.   Past surgical history:  has a past surgical history that includes Carpal tunnel release (Right, 01/18/2019); Colonoscopy (2013); Cystoscopy; Eye surgery; prostate laser ablation/enucleation; Kidney stone surgery; TURP / " transurethral incision / drainage prostate (04/26/2017); Cataract extraction, bilateral (11/2021); Tumor removal; Cardiac catheterization; Penile prosthesis implant (Bilateral, 12/13/2021); Esophagogastroduodenoscopy (N/A, 07/19/2022); and Colonoscopy (N/A, 8/11/2023).  Personal history: family history includes Arthritis in his mother; Breast cancer in his mother; Kidney cancer in his father; Lung cancer in his father; Osteoporosis in his mother.  Social history:  reports that he has never smoked. He has never been exposed to tobacco smoke. He has never used smokeless tobacco. He reports that he does not drink alcohol and does not use drugs.    Review of Systems    Please see past medical and surgical history    Physical Exam  Constitutional:       General: He is not in acute distress.     Appearance: Normal appearance. He is obese. He is not ill-appearing or toxic-appearing.   HENT:      Head: Normocephalic and atraumatic.      Ears:      Comments: No loss of hearing  Pulmonary:      Effort: Pulmonary effort is normal. No respiratory distress.   Abdominal:      Palpations: Abdomen is soft. There is no mass.      Tenderness: There is no abdominal tenderness. There is no right CVA tenderness or left CVA tenderness.   Genitourinary:     Comments: Normal circumcised penis.  Patient has 3 piece penile implant present which is working fine.  Pump in the lower part of the scrotum.    Right and left scrotum is normal.    Right and left testicle and epididymis is normal.    BRENDA.  Prostate gland is very sectors almost 25 g and benign  Musculoskeletal:         General: Normal range of motion.   Skin:     General: Skin is warm.      Coloration: Skin is not jaundiced.   Neurological:      General: No focal deficit present.      Mental Status: He is alert and oriented to person, place, and time.      Motor: No weakness.      Gait: Gait normal.   Psychiatric:         Mood and Affect: Mood normal.         Behavior: Behavior  normal.         Thought Content: Thought content normal.         Judgment: Judgment normal.        Result Review :                 Assessment and Plan    Diagnoses and all orders for this visit:    1. Right ureteral stone (Primary)  -     POC Urinalysis Dipstick, Automated    2. Bilateral flank pain  -     POC Urinalysis Dipstick, Automated    3. Impotence due to erectile dysfunction  -     POC Urinalysis Dipstick, Automated    4. History of kidney stones      His internist is filling up his prescriptions.  He can follow him from now on.  If the patient needs a urologist then his internist can refer him to urologist.  Brief Urine Lab Results  (Last result in the past 365 days)        Color   Clarity   Blood   Leuk Est   Nitrite   Protein   CREAT   Urine HCG        08/05/24 1410 Yellow   Cloudy   Negative   Negative   Negative   Negative                    Follow Up   No follow-ups on file.  Patient was given instructions and counseling regarding his condition or for health maintenance advice. Please see specific information pulled into the AVS if appropriate.     Serafin Pereira MD

## 2024-09-17 ENCOUNTER — OFFICE VISIT (OUTPATIENT)
Dept: CARDIOLOGY | Facility: CLINIC | Age: 69
End: 2024-09-17
Payer: MEDICARE

## 2024-09-17 VITALS
HEIGHT: 69 IN | WEIGHT: 210 LBS | BODY MASS INDEX: 31.1 KG/M2 | HEART RATE: 66 BPM | DIASTOLIC BLOOD PRESSURE: 72 MMHG | SYSTOLIC BLOOD PRESSURE: 146 MMHG

## 2024-09-17 DIAGNOSIS — R00.2 PALPITATIONS: ICD-10-CM

## 2024-09-17 DIAGNOSIS — I10 HYPERTENSION, ESSENTIAL: Primary | ICD-10-CM

## 2024-09-17 DIAGNOSIS — E78.2 HYPERLIPEMIA, MIXED: ICD-10-CM

## 2024-09-17 PROCEDURE — 1159F MED LIST DOCD IN RCRD: CPT | Performed by: SPECIALIST

## 2024-09-17 PROCEDURE — 99214 OFFICE O/P EST MOD 30 MIN: CPT | Performed by: SPECIALIST

## 2024-09-17 PROCEDURE — 1160F RVW MEDS BY RX/DR IN RCRD: CPT | Performed by: SPECIALIST

## 2024-10-03 ENCOUNTER — HOSPITAL ENCOUNTER (OUTPATIENT)
Dept: CARDIOLOGY | Facility: HOSPITAL | Age: 69
Discharge: HOME | End: 2024-10-03
Admitting: SPECIALIST
Payer: MEDICARE

## 2024-10-03 DIAGNOSIS — R00.2 PALPITATIONS: ICD-10-CM

## 2024-10-03 LAB
AORTIC DIMENSIONLESS INDEX: 0.91 (DI)
ASCENDING AORTA: 3.2 CM
BH CV ECHO MEAS - ACS: 1.24 CM
BH CV ECHO MEAS - AO MAX PG: 11 MMHG
BH CV ECHO MEAS - AO MEAN PG: 5.3 MMHG
BH CV ECHO MEAS - AO ROOT DIAM: 3.1 CM
BH CV ECHO MEAS - AO V2 MAX: 165.6 CM/SEC
BH CV ECHO MEAS - AO V2 VTI: 30.9 CM
BH CV ECHO MEAS - AVA(I,D): 2.8 CM2
BH CV ECHO MEAS - EDV(CUBED): 191.3 ML
BH CV ECHO MEAS - EDV(MOD-SP2): 104 ML
BH CV ECHO MEAS - EDV(MOD-SP4): 110 ML
BH CV ECHO MEAS - EF(MOD-BP): 54.4 %
BH CV ECHO MEAS - EF(MOD-SP2): 53.3 %
BH CV ECHO MEAS - EF(MOD-SP4): 54.6 %
BH CV ECHO MEAS - ESV(CUBED): 76 ML
BH CV ECHO MEAS - ESV(MOD-SP2): 48.6 ML
BH CV ECHO MEAS - ESV(MOD-SP4): 49.9 ML
BH CV ECHO MEAS - FS: 26.5 %
BH CV ECHO MEAS - IVS/LVPW: 1.53 CM
BH CV ECHO MEAS - IVSD: 1.47 CM
BH CV ECHO MEAS - LA DIMENSION: 3.7 CM
BH CV ECHO MEAS - LAT PEAK E' VEL: 6.9 CM/SEC
BH CV ECHO MEAS - LV MASS(C)D: 299.5 GRAMS
BH CV ECHO MEAS - LV MAX PG: 11.6 MMHG
BH CV ECHO MEAS - LV MEAN PG: 5 MMHG
BH CV ECHO MEAS - LV V1 MAX: 170.3 CM/SEC
BH CV ECHO MEAS - LV V1 VTI: 28.3 CM
BH CV ECHO MEAS - LVIDD: 5.8 CM
BH CV ECHO MEAS - LVIDS: 4.2 CM
BH CV ECHO MEAS - LVOT AREA: 3 CM2
BH CV ECHO MEAS - LVOT DIAM: 1.97 CM
BH CV ECHO MEAS - LVPWD: 0.96 CM
BH CV ECHO MEAS - MED PEAK E' VEL: 6.3 CM/SEC
BH CV ECHO MEAS - MR MAX PG: 83.7 MMHG
BH CV ECHO MEAS - MR MAX VEL: 457.4 CM/SEC
BH CV ECHO MEAS - MV A MAX VEL: 113.1 CM/SEC
BH CV ECHO MEAS - MV DEC SLOPE: 320.9 CM/SEC2
BH CV ECHO MEAS - MV DEC TIME: 0.32 SEC
BH CV ECHO MEAS - MV E MAX VEL: 90 CM/SEC
BH CV ECHO MEAS - MV E/A: 0.8
BH CV ECHO MEAS - MV MAX PG: 5.5 MMHG
BH CV ECHO MEAS - MV MEAN PG: 2.05 MMHG
BH CV ECHO MEAS - MV P1/2T: 81.8 MSEC
BH CV ECHO MEAS - MV V2 VTI: 32 CM
BH CV ECHO MEAS - MVA(P1/2T): 2.7 CM2
BH CV ECHO MEAS - MVA(VTI): 2.7 CM2
BH CV ECHO MEAS - PA V2 MAX: 108.6 CM/SEC
BH CV ECHO MEAS - QP/QS: 0.63
BH CV ECHO MEAS - RV MAX PG: 4.4 MMHG
BH CV ECHO MEAS - RV V1 MAX: 105 CM/SEC
BH CV ECHO MEAS - RV V1 VTI: 22.5 CM
BH CV ECHO MEAS - RVDD: 3 CM
BH CV ECHO MEAS - RVOT DIAM: 1.76 CM
BH CV ECHO MEAS - SV(LVOT): 86 ML
BH CV ECHO MEAS - SV(MOD-SP2): 55.4 ML
BH CV ECHO MEAS - SV(MOD-SP4): 60.1 ML
BH CV ECHO MEAS - SV(RVOT): 54.4 ML
BH CV ECHO MEAS - TAPSE (>1.6): 1.78 CM
BH CV ECHO MEASUREMENTS AVERAGE E/E' RATIO: 13.64
BH CV XLRA - TDI S': 14.1 CM/SEC
IVRT: 61 MS
LEFT ATRIUM VOLUME INDEX: 23.3 ML/M2

## 2024-10-03 PROCEDURE — 93306 TTE W/DOPPLER COMPLETE: CPT

## 2024-10-04 ENCOUNTER — TELEPHONE (OUTPATIENT)
Dept: CARDIOLOGY | Facility: CLINIC | Age: 69
End: 2024-10-04
Payer: MEDICARE

## 2024-10-04 NOTE — TELEPHONE ENCOUNTER
----- Message from Beth Nassar sent at 10/4/2024 12:12 PM EDT -----  Echocardiogram shows normal heart muscle function and only mild aortic stenosis murmur noted which will be monitored with repeat echos. Follow up as scheduled. Notify office of any new/worsening cardiac symptoms

## 2024-10-04 NOTE — TELEPHONE ENCOUNTER
Spoke with patient, patient is aware and verbalized understanding.   Patient wanted to know if there were any restrictions, I transferred him to [facility].

## 2024-10-10 ENCOUNTER — TELEPHONE (OUTPATIENT)
Dept: CARDIOLOGY | Facility: CLINIC | Age: 69
End: 2024-10-10
Payer: MEDICARE

## 2024-10-10 RX ORDER — CARVEDILOL 12.5 MG/1
12.5 TABLET ORAL 2 TIMES DAILY
Qty: 60 TABLET | Refills: 11 | Status: SHIPPED | OUTPATIENT
Start: 2024-10-10

## 2024-10-10 NOTE — TELEPHONE ENCOUNTER
----- Message from Genet Roman sent at 10/10/2024 11:13 AM EDT -----  Holter monitor showed normal sinus rhythm with an average heart rate of 74, he had frequent PACs, which are early beats originating from the upper portion of the heart and occasional PVCs which are early beats originating from the lower portion of the heart.  He would benefit from increasing dose of carvedilol from 6.25 twice daily to 12.5 twice daily.  After discussing with patient if he is agreeable, please send updated prescription to pharmacy.,

## 2025-03-30 NOTE — PROGRESS NOTES
[facility]  Cardiology progress Note    Patient Name: Jean-Pierre Anaya  : 1955    CHIEF COMPLAINT  Hypertension        Subjective   Subjective     HISTORY OF PRESENT ILLNESS    Jean-Pierre Anaya is a 69 y.o. male with history of hypertension.  No palpitations.    REVIEW OF SYSTEMS    Constitutional:    No fever, no weight loss  Skin:     No rash  Otolaryngeal:    No difficulty swallowing  Cardiovascular: See HPI.  Pulmonary:    No cough, no sputum production    Personal History     Social History:    reports that he has never smoked. He has never been exposed to tobacco smoke. He has never used smokeless tobacco. He reports that he does not drink alcohol and does not use drugs.    Home Medications:  Current Outpatient Medications on File Prior to Visit   Medication Sig    albuterol sulfate HFA (ProAir HFA) 108 (90 Base) MCG/ACT inhaler Inhale 2 puffs Every 4 (Four) Hours As Needed.    allopurinol (ZYLOPRIM) 300 MG tablet Take 1 tablet by mouth Daily.    aspirin 81 MG EC tablet Take 1 tablet by mouth Every Night. LAST DOSE 22 PM    Azelastine HCl 137 MCG/SPRAY solution Administer 1-2 sprays into the nostril(s) as directed by provider 2 (Two) Times a Day.    carvedilol (COREG) 12.5 MG tablet Take 1 tablet by mouth 2 (Two) Times a Day.    fenofibrate 160 MG tablet Take 1 tablet by mouth Every Night.    fexofenadine (ALLEGRA) 180 MG tablet Take 1 tablet by mouth Every Night.    gabapentin (NEURONTIN) 300 MG capsule Take 1 capsule by mouth 2 (Two) Times a Day.    HumaLOG KwikPen 100 UNIT/ML solution pen-injector INJECT 25 UNITS SUBCUTANEOUSLY THREE TIMES A DAY    Insulin Glargine, 1 Unit Dial, (Toujeo SoloStar) 300 UNIT/ML solution pen-injector injection Inject 65 Units under the skin into the appropriate area as directed Every Night.    Jardiance 25 MG tablet tablet TAKE ONE TABLET BY MOUTH ONCE EVERY MORNING    losartan (COZAAR) 25 MG tablet Take 1 tablet by mouth Daily.     metroNIDAZOLE (METROCREAM) 0.75 % cream Apply 1 Application topically to the appropriate area as directed As Needed.    montelukast (SINGULAIR) 10 MG tablet Take 1 tablet by mouth Daily.    Mounjaro 10 MG/0.5ML solution pen-injector pen INJECT CONTENTS OF 1 SYRINGE SUBCUTANEOUSLY ONCE WEEKLY    [place]-3 1000 MG capsule Take 2 capsules by mouth 2 (Two) Times a Day.    potassium citrate (UROCIT-K) 10 MEQ (1080 MG) CR tablet Take 2 tablets by mouth 4 (Four) Times a Day With Meals & at Bedtime. FOR KIDNEY STONES    traMADol (ULTRAM) 50 MG tablet Take 1 tablet by mouth 2 (Two) Times a Day As Needed for Moderate Pain.     No current facility-administered medications on file prior to visit.       Past Medical History:   Diagnosis Date    Abnormal EKG     Arthritis     Asthma     Bacteriuria 02/17/2021    Benign essential hypertension     Bilateral carpal tunnel syndrome 01/29/2019    BPH (benign prostatic hyperplasia)     Chronic kidney disease     stage 3    Diabetes     Diabetes mellitus type 2, noninsulin dependent     ED (erectile dysfunction)     HLD (hyperlipidemia)     HTN (hypertension)     Hyperlipemia     Hypogonadism in male     Leg swelling     Neuropathy     Preop examination 01/29/2019    BILATERAL CTS WANTS TO HAVE RIGHT CTR BEFORE LEFT    Prostate disorder     Renal calculus or stone     Seasonal allergies        Allergies:  No Known Allergies    Objective    Objective       Vitals:   Heart Rate:  [77] 77  BP: (140)/(64) 140/64  Body mass index is 30.86 kg/m².     PHYSICAL EXAM:    General Appearance:   well developed  well nourished  HENT:   oropharynx moist  lips not cyanotic  Neck:  thyroid not enlarged  supple  Respiratory:  no respiratory distress  normal breath sounds  no rales  Cardiovascular:  no jugular venous distention  regular rhythm  apical impulse normal  S1 normal, S2 normal  no S3, no S4   no murmur  no rub, no thrill  carotid pulses normal; no bruit  pedal pulses normal  lower extremity  edema: none    Skin:   warm, dry  Psychiatric:  judgement and insight appropriate  normal mood and affect        Result Review:  I have personally reviewed the available results from  [x]  Laboratory  [x]  EKG  [x]  Cardiology  [x]  Medications  [x]  Old records  []  Other:     Procedures    Results for orders placed during the hospital encounter of 10/03/24    Adult Transthoracic Echo Complete W/ Cont if Necessary Per Protocol    Interpretation Summary  Mild left ventricular hypertrophy with normal left ventricular systolic function.  Fibrocalcific aortic valve with mild aortic stenosis     Impression/Plan:  1.  Essential hypertension controlled: Continue losartan 25 mg once a day.  Continue carvedilol 12.5 mg twice a day.  Monitor blood pressure regularly.  2.  Palpitations/PACs: Improved.  Continue carvedilol 12.5 mg twice a day.  3.  Mixed hyperlipidemia: Continue fenofibrate 160 mg once a day.                 Jose David Hernández MD   04/01/25   14:42 EDT

## 2025-04-01 ENCOUNTER — OFFICE VISIT (OUTPATIENT)
Dept: CARDIOLOGY | Facility: CLINIC | Age: 70
End: 2025-04-01
Payer: MEDICARE

## 2025-04-01 VITALS
SYSTOLIC BLOOD PRESSURE: 140 MMHG | DIASTOLIC BLOOD PRESSURE: 64 MMHG | HEART RATE: 77 BPM | WEIGHT: 209 LBS | BODY MASS INDEX: 30.96 KG/M2 | HEIGHT: 69 IN

## 2025-04-01 DIAGNOSIS — R00.2 PALPITATIONS: Primary | ICD-10-CM

## 2025-04-01 DIAGNOSIS — E78.2 HYPERLIPEMIA, MIXED: ICD-10-CM

## 2025-04-01 DIAGNOSIS — I10 HYPERTENSION, ESSENTIAL: ICD-10-CM

## 2025-04-01 PROCEDURE — 1160F RVW MEDS BY RX/DR IN RCRD: CPT | Performed by: SPECIALIST

## 2025-04-01 PROCEDURE — 99214 OFFICE O/P EST MOD 30 MIN: CPT | Performed by: SPECIALIST

## 2025-04-01 PROCEDURE — 1159F MED LIST DOCD IN RCRD: CPT | Performed by: SPECIALIST

## 2025-05-09 ENCOUNTER — HOSPITAL ENCOUNTER (EMERGENCY)
Facility: HOSPITAL | Age: 70
Discharge: HOME | End: 2025-05-09
Attending: EMERGENCY MEDICINE
Payer: MEDICARE

## 2025-05-09 ENCOUNTER — APPOINTMENT (OUTPATIENT)
Dept: CT IMAGING | Facility: HOSPITAL | Age: 70
End: 2025-05-09
Payer: MEDICARE

## 2025-05-09 VITALS
HEIGHT: 69 IN | WEIGHT: 200.62 LBS | BODY MASS INDEX: 29.71 KG/M2 | OXYGEN SATURATION: 93 % | RESPIRATION RATE: 16 BRPM | SYSTOLIC BLOOD PRESSURE: 154 MMHG | DIASTOLIC BLOOD PRESSURE: 68 MMHG | HEART RATE: 66 BPM | TEMPERATURE: 97.7 F

## 2025-05-09 DIAGNOSIS — N20.1 URETERAL CALCULI: Primary | ICD-10-CM

## 2025-05-09 DIAGNOSIS — N23 URETERAL COLIC: ICD-10-CM

## 2025-05-09 LAB
ALBUMIN SERPL-MCNC: 4.2 G/DL (ref 3.5–5.2)
ALBUMIN/GLOB SERPL: 1.8 G/DL
ALP SERPL-CCNC: 75 U/L (ref 39–117)
ALT SERPL W P-5'-P-CCNC: 14 U/L (ref 1–41)
ANION GAP SERPL CALCULATED.3IONS-SCNC: 7.1 MMOL/L (ref 5–15)
AST SERPL-CCNC: 17 U/L (ref 1–40)
BACTERIA UR QL AUTO: ABNORMAL /HPF
BASOPHILS # BLD AUTO: 0.04 10*3/MM3 (ref 0–0.2)
BASOPHILS NFR BLD AUTO: 0.5 % (ref 0–1.5)
BILIRUB SERPL-MCNC: 0.3 MG/DL (ref 0–1.2)
BILIRUB UR QL STRIP: NEGATIVE
BUN SERPL-MCNC: 23 MG/DL (ref 8–23)
BUN/CREAT SERPL: 15.3 (ref 7–25)
CALCIUM SPEC-SCNC: 9.2 MG/DL (ref 8.6–10.5)
CHLORIDE SERPL-SCNC: 107 MMOL/L (ref 98–107)
CLARITY UR: CLEAR
CO2 SERPL-SCNC: 26.9 MMOL/L (ref 22–29)
COLOR UR: YELLOW
CREAT SERPL-MCNC: 1.5 MG/DL (ref 0.76–1.27)
D-LACTATE SERPL-SCNC: 0.8 MMOL/L (ref 0.5–2)
DEPRECATED RDW RBC AUTO: 39.1 FL (ref 37–54)
EGFRCR SERPLBLD CKD-EPI 2021: 49.5 ML/MIN/1.73
EOSINOPHIL # BLD AUTO: 0.21 10*3/MM3 (ref 0–0.4)
EOSINOPHIL NFR BLD AUTO: 2.8 % (ref 0.3–6.2)
ERYTHROCYTE [DISTWIDTH] IN BLOOD BY AUTOMATED COUNT: 12.3 % (ref 12.3–15.4)
GLOBULIN UR ELPH-MCNC: 2.3 GM/DL
GLUCOSE SERPL-MCNC: 133 MG/DL (ref 65–99)
GLUCOSE UR STRIP-MCNC: NEGATIVE MG/DL
HCT VFR BLD AUTO: 42.2 % (ref 37.5–51)
HGB BLD-MCNC: 14 G/DL (ref 13–17.7)
HGB UR QL STRIP.AUTO: ABNORMAL
HOLD SPECIMEN: NORMAL
HOLD SPECIMEN: NORMAL
HYALINE CASTS UR QL AUTO: ABNORMAL /LPF
IMM GRANULOCYTES # BLD AUTO: 0.05 10*3/MM3 (ref 0–0.05)
IMM GRANULOCYTES NFR BLD AUTO: 0.7 % (ref 0–0.5)
KETONES UR QL STRIP: NEGATIVE
LEUKOCYTE ESTERASE UR QL STRIP.AUTO: NEGATIVE
LIPASE SERPL-CCNC: 41 U/L (ref 13–60)
LYMPHOCYTES # BLD AUTO: 1.5 10*3/MM3 (ref 0.7–3.1)
LYMPHOCYTES NFR BLD AUTO: 19.8 % (ref 19.6–45.3)
MCH RBC QN AUTO: 28.7 PG (ref 26.6–33)
MCHC RBC AUTO-ENTMCNC: 33.2 G/DL (ref 31.5–35.7)
MCV RBC AUTO: 86.7 FL (ref 79–97)
MONOCYTES # BLD AUTO: 0.77 10*3/MM3 (ref 0.1–0.9)
MONOCYTES NFR BLD AUTO: 10.1 % (ref 5–12)
NEUTROPHILS NFR BLD AUTO: 5.02 10*3/MM3 (ref 1.7–7)
NEUTROPHILS NFR BLD AUTO: 66.1 % (ref 42.7–76)
NITRITE UR QL STRIP: NEGATIVE
NRBC BLD AUTO-RTO: 0 /100 WBC (ref 0–0.2)
PH UR STRIP.AUTO: 5.5 [PH] (ref 5–8)
PLATELET # BLD AUTO: 203 10*3/MM3 (ref 140–450)
PMV BLD AUTO: 11.2 FL (ref 6–12)
POTASSIUM SERPL-SCNC: 4.4 MMOL/L (ref 3.5–5.2)
PROT SERPL-MCNC: 6.5 G/DL (ref 6–8.5)
PROT UR QL STRIP: NEGATIVE
RBC # BLD AUTO: 4.87 10*6/MM3 (ref 4.14–5.8)
RBC # UR STRIP: ABNORMAL /HPF
REF LAB TEST METHOD: ABNORMAL
SODIUM SERPL-SCNC: 141 MMOL/L (ref 136–145)
SP GR UR STRIP: 1.02 (ref 1–1.03)
SQUAMOUS #/AREA URNS HPF: ABNORMAL /HPF
UROBILINOGEN UR QL STRIP: ABNORMAL
WBC # UR STRIP: ABNORMAL /HPF
WBC NRBC COR # BLD AUTO: 7.59 10*3/MM3 (ref 3.4–10.8)
WHOLE BLOOD HOLD COAG: NORMAL
WHOLE BLOOD HOLD SPECIMEN: NORMAL

## 2025-05-09 PROCEDURE — 80053 COMPREHEN METABOLIC PANEL: CPT | Performed by: EMERGENCY MEDICINE

## 2025-05-09 PROCEDURE — 25810000003 SODIUM CHLORIDE 0.9 % SOLUTION: Performed by: EMERGENCY MEDICINE

## 2025-05-09 PROCEDURE — 99285 EMERGENCY DEPT VISIT HI MDM: CPT

## 2025-05-09 PROCEDURE — 25510000001 IOPAMIDOL PER 1 ML: Performed by: EMERGENCY MEDICINE

## 2025-05-09 PROCEDURE — 85025 COMPLETE CBC W/AUTO DIFF WBC: CPT | Performed by: EMERGENCY MEDICINE

## 2025-05-09 PROCEDURE — 83605 ASSAY OF LACTIC ACID: CPT | Performed by: EMERGENCY MEDICINE

## 2025-05-09 PROCEDURE — 25010000002 HYDROMORPHONE 1 MG/ML SOLUTION: Performed by: EMERGENCY MEDICINE

## 2025-05-09 PROCEDURE — 81001 URINALYSIS AUTO W/SCOPE: CPT | Performed by: EMERGENCY MEDICINE

## 2025-05-09 PROCEDURE — 96374 THER/PROPH/DIAG INJ IV PUSH: CPT

## 2025-05-09 PROCEDURE — 83690 ASSAY OF LIPASE: CPT | Performed by: EMERGENCY MEDICINE

## 2025-05-09 PROCEDURE — 25010000002 ONDANSETRON PER 1 MG: Performed by: EMERGENCY MEDICINE

## 2025-05-09 PROCEDURE — 96375 TX/PRO/DX INJ NEW DRUG ADDON: CPT

## 2025-05-09 PROCEDURE — 25010000002 KETOROLAC TROMETHAMINE PER 15 MG: Performed by: EMERGENCY MEDICINE

## 2025-05-09 PROCEDURE — 74177 CT ABD & PELVIS W/CONTRAST: CPT

## 2025-05-09 RX ORDER — SODIUM CHLORIDE 0.9 % (FLUSH) 0.9 %
10 SYRINGE (ML) INJECTION AS NEEDED
Status: DISCONTINUED | OUTPATIENT
Start: 2025-05-09 | End: 2025-05-09 | Stop reason: HOSPADM

## 2025-05-09 RX ORDER — ONDANSETRON 2 MG/ML
4 INJECTION INTRAMUSCULAR; INTRAVENOUS ONCE
Status: COMPLETED | OUTPATIENT
Start: 2025-05-09 | End: 2025-05-09

## 2025-05-09 RX ORDER — OXYCODONE AND ACETAMINOPHEN 7.5; 325 MG/1; MG/1
1 TABLET ORAL EVERY 8 HOURS PRN
Qty: 15 TABLET | Refills: 0 | Status: SHIPPED | OUTPATIENT
Start: 2025-05-09

## 2025-05-09 RX ORDER — KETOROLAC TROMETHAMINE 15 MG/ML
15 INJECTION, SOLUTION INTRAMUSCULAR; INTRAVENOUS ONCE
Status: COMPLETED | OUTPATIENT
Start: 2025-05-09 | End: 2025-05-09

## 2025-05-09 RX ORDER — ONDANSETRON 4 MG/1
4 TABLET, ORALLY DISINTEGRATING ORAL EVERY 8 HOURS PRN
Qty: 12 TABLET | Refills: 0 | Status: SHIPPED | OUTPATIENT
Start: 2025-05-09

## 2025-05-09 RX ORDER — IOPAMIDOL 755 MG/ML
100 INJECTION, SOLUTION INTRAVASCULAR
Status: COMPLETED | OUTPATIENT
Start: 2025-05-09 | End: 2025-05-09

## 2025-05-09 RX ORDER — TAMSULOSIN HYDROCHLORIDE 0.4 MG/1
1 CAPSULE ORAL DAILY
Qty: 30 CAPSULE | Refills: 0 | Status: SHIPPED | OUTPATIENT
Start: 2025-05-09

## 2025-05-09 RX ADMIN — ONDANSETRON 4 MG: 2 INJECTION INTRAMUSCULAR; INTRAVENOUS at 10:27

## 2025-05-09 RX ADMIN — SODIUM CHLORIDE 1000 ML: 9 INJECTION, SOLUTION INTRAVENOUS at 10:25

## 2025-05-09 RX ADMIN — KETOROLAC TROMETHAMINE 15 MG: 15 INJECTION, SOLUTION INTRAMUSCULAR; INTRAVENOUS at 10:27

## 2025-05-09 RX ADMIN — HYDROMORPHONE HYDROCHLORIDE 1 MG: 1 INJECTION, SOLUTION INTRAMUSCULAR; INTRAVENOUS; SUBCUTANEOUS at 10:27

## 2025-05-09 RX ADMIN — IOPAMIDOL 100 ML: 755 INJECTION, SOLUTION INTRAVENOUS at 10:56

## 2025-05-09 NOTE — DISCHARGE INSTRUCTIONS
Drink plenty of fluids.  Take medications as directed.  Return for uncontrolled pain, persistent vomiting, fever, other severe symptoms.  Follow-up with urologist to make sure that stone passes

## 2025-05-09 NOTE — ED PROVIDER NOTES
Time: 9:50 AM EDT  Date of encounter:  5/9/2025  Independent Historian/Clinical History and Information was obtained by:   Patient    History is limited by: N/A    Chief Complaint: Left flank pain      History of Present Illness:  Patient is a 69 y.o. year old male who presents to the emergency department for evaluation of left flank pain since last night.  The patient has had nausea but no vomiting.  He said no fever chills cough or difficulty urinating.  He denies any other symptoms      Patient Care Team  Primary Care Provider: Ian Wilosn MD    Past Medical History:     No Known Allergies  Past Medical History:   Diagnosis Date    Abnormal EKG     Arthritis     Asthma     Bacteriuria 02/17/2021    Benign essential hypertension     Bilateral carpal tunnel syndrome 01/29/2019    BPH (benign prostatic hyperplasia)     Chronic kidney disease     stage 3    Diabetes     Diabetes mellitus type 2, noninsulin dependent     ED (erectile dysfunction)     HLD (hyperlipidemia)     HTN (hypertension)     Hyperlipemia     Hypogonadism in male     Leg swelling     Neuropathy     Preop examination 01/29/2019    BILATERAL CTS WANTS TO HAVE RIGHT CTR BEFORE LEFT    Prostate disorder     Renal calculus or stone     Seasonal allergies      Past Surgical History:   Procedure Laterality Date    CARDIAC CATHETERIZATION      CARPAL TUNNEL RELEASE Right 01/18/2019    CTR    CATARACT EXTRACTION, BILATERAL  11/2021    COLONOSCOPY  2013    COLONOSCOPY N/A 8/11/2023    Procedure: COLONOSCOPY WITH POLYPECTOMY;  Surgeon: Luis Felipe Ferrell MD;  Location: [facility] ENDOSCOPY;  Service: Gastroenterology;  Laterality: N/A;  COLON POLYPS    CYSTOSCOPY      ENDOSCOPY N/A 07/19/2022    Procedure: ESOPHAGOGASTRODUODENOSCOPY WITH BIOPSIES;  Surgeon: Luis Felipe Ferrell MD;  Location: [facility] ENDOSCOPY;  Service: Gastroenterology;  Laterality: N/A;  GASTRIC INLET PATCH    EYE SURGERY      EYE IMPLANT    KIDNEY STONE SURGERY      PENILE PROSTHESIS  IMPLANT Bilateral 12/13/2021    Procedure: PENILE PROSTHESIS PLACEMENT;  Surgeon: Serafin Pereira MD;  Location: [facility] MAIN OR;  Service: Urology;  Laterality: Bilateral;    PROSTATE LASER ABLATION/ENUCLEATION      TUMOR REMOVAL      fatty tumor- from back    TURP / TRANSURETHRAL INCISION / DRAINAGE PROSTATE  04/26/2017     Family History   Problem Relation Age of Onset    Breast cancer Mother     Osteoporosis Mother     Arthritis Mother     Lung cancer Father     Kidney cancer Father     Colon cancer Neg Hx     Malig Hyperthermia Neg Hx        Home Medications:  Prior to Admission medications    Medication Sig Start Date End Date Taking? Authorizing Provider   albuterol sulfate HFA (ProAir HFA) 108 (90 Base) MCG/ACT inhaler Inhale 2 puffs Every 4 (Four) Hours As Needed.    Dylan Lechuga MD   allopurinol (ZYLOPRIM) 300 MG tablet Take 1 tablet by mouth Daily.    Dylan Lechuga MD   aspirin 81 MG EC tablet Take 1 tablet by mouth Every Night. LAST DOSE 7/17/22 PM    Dylan Lechuga MD   Azelastine HCl 137 MCG/SPRAY solution Administer 1-2 sprays into the nostril(s) as directed by provider 2 (Two) Times a Day. 10/25/21   Dylan Lechuga MD   carvedilol (COREG) 12.5 MG tablet Take 1 tablet by mouth 2 (Two) Times a Day. 10/10/24   Genet Roman APRN   fenofibrate 160 MG tablet Take 1 tablet by mouth Every Night.    Dylan Lechuga MD   fexofenadine (ALLEGRA) 180 MG tablet Take 1 tablet by mouth Every Night.    Dylan Lechuga MD   gabapentin (NEURONTIN) 300 MG capsule Take 1 capsule by mouth 2 (Two) Times a Day.    Dylan Lechuga MD   HumaLOG KwikPen 100 UNIT/ML solution pen-injector INJECT 25 UNITS SUBCUTANEOUSLY THREE TIMES A DAY 5/15/23   Dylan Lechuga MD   Insulin Glargine, 1 Unit Dial, (Toujeo SoloStar) 300 UNIT/ML solution pen-injector injection Inject 65 Units under the skin into the appropriate area as directed Every Night.    Dylan Lechuga  MD   Jardiance 25 MG tablet tablet TAKE ONE TABLET BY MOUTH ONCE EVERY MORNING 6/21/23   Dylan Lechuga MD   losartan (COZAAR) 25 MG tablet Take 1 tablet by mouth Daily. 10/17/23   Jose David Hernández MD   metroNIDAZOLE (METROCREAM) 0.75 % cream Apply 1 Application topically to the appropriate area as directed As Needed. 4/13/22   Dylan Lechuga MD   montelukast (SINGULAIR) 10 MG tablet Take 1 tablet by mouth Daily.    Dylan Lechuga MD   Mounjaro 10 MG/0.5ML solution pen-injector pen INJECT CONTENTS OF 1 SYRINGE SUBCUTANEOUSLY ONCE WEEKLY 7/29/24   Dylan Lechuga MD   Omega-3 1000 MG capsule Take 2 capsules by mouth 2 (Two) Times a Day.    Dylan Lechuga MD   potassium citrate (UROCIT-K) 10 MEQ (1080 MG) CR tablet Take 2 tablets by mouth 4 (Four) Times a Day With Meals & at Bedtime. FOR KIDNEY STONES    Dylan Lechuga MD   traMADol (ULTRAM) 50 MG tablet Take 1 tablet by mouth 2 (Two) Times a Day As Needed for Moderate Pain.    Dylan Lechuga MD        Social History:   Social History     Tobacco Use    Smoking status: Never     Passive exposure: Never    Smokeless tobacco: Never   Vaping Use    Vaping status: Never Used   Substance Use Topics    Alcohol use: Never    Drug use: Never         Review of Systems:  Review of Systems   Constitutional:  Negative for chills and fever.   HENT:  Negative for congestion, ear pain and sore throat.    Eyes:  Negative for pain.   Respiratory:  Negative for cough, chest tightness and shortness of breath.    Cardiovascular:  Negative for chest pain.   Gastrointestinal:  Positive for nausea. Negative for abdominal pain, diarrhea and vomiting.   Genitourinary:  Positive for flank pain. Negative for hematuria.   Musculoskeletal:  Negative for joint swelling.   Skin:  Negative for pallor.   Neurological:  Negative for seizures and headaches.   All other systems reviewed and are negative.       Physical Exam:  /68 (BP Location:  "Right arm, Patient Position: Lying)   Pulse 66   Temp 97.7 °F (36.5 °C) (Oral)   Resp 16   Ht 175.3 cm (69\")   Wt 91 kg (200 lb 9.9 oz)   SpO2 93%   BMI 29.63 kg/m²     Physical Exam  Vitals and nursing note reviewed.   Constitutional:       General: He is not in acute distress.     Appearance: Normal appearance. He is not toxic-appearing.   HENT:      Head: Normocephalic and atraumatic.      Mouth/Throat:      Mouth: Mucous membranes are moist.   Eyes:      General: No scleral icterus.  Cardiovascular:      Rate and Rhythm: Normal rate and regular rhythm.      Pulses: Normal pulses.      Heart sounds: Normal heart sounds.   Pulmonary:      Effort: Pulmonary effort is normal. No respiratory distress.      Breath sounds: Normal breath sounds.   Abdominal:      General: Abdomen is flat.      Palpations: Abdomen is soft.      Tenderness: There is no abdominal tenderness.   Musculoskeletal:         General: Normal range of motion.      Cervical back: Normal range of motion and neck supple.   Skin:     General: Skin is warm and dry.      Capillary Refill: Capillary refill takes less than 2 seconds.   Neurological:      General: No focal deficit present.      Mental Status: He is alert and oriented to person, place, and time. Mental status is at baseline.   Psychiatric:         Mood and Affect: Mood normal.         Behavior: Behavior normal.         Thought Content: Thought content normal.         Judgment: Judgment normal.                    Medical Decision Making:      Comorbidities that affect care:    Prior kidney stone    External Notes reviewed:    Previous Clinic Note: Office visit with cardiology      The following orders were placed and all results were independently analyzed by me:  Orders Placed This Encounter   Procedures    CT Abdomen Pelvis With Contrast    [place] Draw    Comprehensive Metabolic Panel    Lipase    Urinalysis With Microscopic If Indicated (No Culture) - Urine, Clean Catch    Lactic " Acid, Plasma    CBC Auto Differential    Urinalysis, Microscopic Only - Urine, Clean Catch    Undress & Gown    CBC & Differential    Green Top (Gel)    Lavender Top    Gold Top - SST    Light Blue Top       Medications Given in the Emergency Department:  Medications   sodium chloride 0.9 % bolus 1,000 mL (0 mL Intravenous Stopped 5/9/25 1119)   HYDROmorphone (DILAUDID) injection 1 mg (1 mg Intravenous Given 5/9/25 1027)   ondansetron (ZOFRAN) injection 4 mg (4 mg Intravenous Given 5/9/25 1027)   ketorolac (TORADOL) injection 15 mg (15 mg Intravenous Given 5/9/25 1027)   iopamidol (ISOVUE-370) 76 % injection 100 mL (100 mL Intravenous Given 5/9/25 1056)        ED Course:         Labs:    Lab Results (last 24 hours)       Procedure Component Value Units Date/Time    CBC & Differential [[id number]]  (Abnormal) Collected: 05/09/25 0925    Specimen: Blood Updated: 05/09/25 0932    Narrative:      The following orders were created for panel order CBC & Differential.  Procedure                               Abnormality         Status                     ---------                               -----------         ------                     CBC Auto Differential[[id number]]        Abnormal            Final result                 Please view results for these tests on the individual orders.    Comprehensive Metabolic Panel [[id number]]  (Abnormal) Collected: 05/09/25 0925    Specimen: Blood Updated: 05/09/25 0949     Glucose 133 mg/dL      BUN 23 mg/dL      Creatinine 1.50 mg/dL      Sodium 141 mmol/L      Potassium 4.4 mmol/L      Chloride 107 mmol/L      CO2 26.9 mmol/L      Calcium 9.2 mg/dL      Total Protein 6.5 g/dL      Albumin 4.2 g/dL      ALT (SGPT) 14 U/L      AST (SGOT) 17 U/L      Alkaline Phosphatase 75 U/L      Total Bilirubin 0.3 mg/dL      Globulin 2.3 gm/dL      A/G Ratio 1.8 g/dL      BUN/Creatinine Ratio 15.3     Anion Gap 7.1 mmol/L      eGFR 49.5 mL/min/1.73     Narrative:      GFR Categories in Chronic  Kidney Disease (CKD)              GFR Category          GFR (mL/min/1.73)    Interpretation  G1                    90 or greater        Normal or high (1)  G2                    60-89                Mild decrease (1)  G3a                   45-59                Mild to moderate decrease  G3b                   30-44                Moderate to severe decrease  G4                    15-29                Severe decrease  G5                    14 or less           Kidney failure    (1)In the absence of evidence of kidney disease, neither GFR category G1 or G2 fulfill the criteria for CKD.    eGFR calculation 2021 CKD-EPI creatinine equation, which does not include race as a factor    Lipase [[id number]]  (Normal) Collected: 05/09/25 0925    Specimen: Blood Updated: 05/09/25 0949     Lipase 41 U/L     Lactic Acid, Plasma [[id number]]  (Normal) Collected: 05/09/25 0925    Specimen: Blood Updated: 05/09/25 0947     Lactate 0.8 mmol/L     CBC Auto Differential [[id number]]  (Abnormal) Collected: 05/09/25 0925    Specimen: Blood Updated: 05/09/25 0932     WBC 7.59 10*3/mm3      RBC 4.87 10*6/mm3      Hemoglobin 14.0 g/dL      Hematocrit 42.2 %      MCV 86.7 fL      MCH 28.7 pg      MCHC 33.2 g/dL      RDW 12.3 %      RDW-SD 39.1 fl      MPV 11.2 fL      Platelets 203 10*3/mm3      Neutrophil % 66.1 %      Lymphocyte % 19.8 %      Monocyte % 10.1 %      Eosinophil % 2.8 %      Basophil % 0.5 %      Immature Grans % 0.7 %      Neutrophils, Absolute 5.02 10*3/mm3      Lymphocytes, Absolute 1.50 10*3/mm3      Monocytes, Absolute 0.77 10*3/mm3      Eosinophils, Absolute 0.21 10*3/mm3      Basophils, Absolute 0.04 10*3/mm3      Immature Grans, Absolute 0.05 10*3/mm3      nRBC 0.0 /100 WBC     Urinalysis With Microscopic If Indicated (No Culture) - Urine, Clean Catch [[id number]]  (Abnormal) Collected: 05/09/25 0932    Specimen: Urine, Clean Catch Updated: 05/09/25 0948     Color, UA Yellow     Appearance, UA Clear     pH, UA 5.5      Specific Gravity, UA 1.023     Glucose, UA Negative     Ketones, UA Negative     Bilirubin, UA Negative     Blood, UA Moderate (2+)     Protein, UA Negative     Leuk Esterase, UA Negative     Nitrite, UA Negative     Urobilinogen, UA 2.0 E.U./dL    Urinalysis, Microscopic Only - Urine, Clean Catch [[id number]]  (Abnormal) Collected: 05/09/25 0932    Specimen: Urine, Clean Catch Updated: 05/09/25 0948     RBC, UA 21-50 /HPF      WBC, UA 0-2 /HPF      Bacteria, UA None Seen /HPF      Squamous Epithelial Cells, UA 0-2 /HPF      Hyaline Casts, UA 3-6 /LPF      Methodology Automated Microscopy             Imaging:    CT Abdomen Pelvis With Contrast  Result Date: 5/9/2025  CT ABDOMEN PELVIS W CONTRAST Date of Exam: 5/9/2025 10:39 AM EDT Indication: Flank pain. Comparison: 1/21/2022 Technique: Axial CT images were obtained of the abdomen and pelvis after the uneventful intravenous administration of iodinated contrast. Reconstructed coronal and sagittal images were also obtained. Automated exposure control and iterative construction methods were used. Findings: Multiple nodules seen in the lung bases measuring up to about 6 mm in the right lower lobe and 0.5 cm in the left lower lobe. Findings are similar to prior CT scan from 2022. There is a penile prosthesis with the reservoir in the left pelvic wall. Liver appears normal. The gallbladder demonstrates mild mild thickening along the fundus without inflammatory change. There is a few small splenic lesions which are nonspecific measuring 5 mm or less. Pancreas is small in size. Pancreatic duct is prominent in the pancreatic head but otherwise unremarkable questionable significance. Adrenal glands appear normal. There is renal cortical thinning. There is bilateral renal cysts. There are at least 4 right-sided nephroliths measuring up to about 0.4 cm. There is no right-sided obstructive uropathy. Several left-sided nephrolithiasis measure up to about 0.4 cm there is moderate  obstructive uropathy on the left due to a stone in the proximal left ureter just distal to the left ureteropelvic junction measuring up to about 8 mm. There is significant stranding adjacent to the left kidney and renal pelvis. There is mild thickening of the urothelium just proximal to the stone. Remaining left ureter is normal. Bladder appears normal. Moderate stool in the colon. No small bowel finding is seen. Accessory left renal artery. Mild to moderate atherosclerosis abdominal aorta. No enlarged adenopathy. Degenerative changes lower lumbar spine.     Impression: 1.Moderate obstructive uropathy on the left due to a stone in the proximal left ureter measuring 0.8 cm. 2.Bilateral nephrolithiasis measuring up to 0.4 cm on the right and 0.4 cm in the left. 3.Other findings as above. Electronically Signed: Angel Ruiz MD  5/9/2025 11:51 AM EDT  Workstation ID: [id number]        Differential Diagnosis and Discussion:    Flank Pain: Differential diagnosis includes but is not limited to kidney stones, pyelonephritis, musculoskeletal disorders, renal infarction, urinary tract infection, hydronephrosis, radiculopathy, aortic aneurysm, renal cell carcinoma.    PROCEDURES:    Labs were collected in the emergency department and all labs were reviewed and interpreted by me.  X-ray were performed in the emergency department and all X-ray impressions were independently interpreted by me.    No orders to display       Procedures    MDM     Amount and/or Complexity of Data Reviewed  Clinical lab tests: reviewed  Tests in the radiology section of CPT®: reviewed                       Patient Care Considerations:    SEPSIS was considered but is NOT present in the emergency department as SIRS criteria is not present.      Consultants/Shared Management Plan:    None    Social Determinants of Health:    Patient is independent, reliable, and has access to care.       Disposition and Care Coordination:    Discharged: I considered  escalation of care by admitting this patient to the hospital, however the patient is now pain-free 3.  He has no signs of infection with his kidney stone    I have explained the patient´s condition, diagnoses and treatment plan based on the information available to me at this time. I have answered questions and addressed any concerns. The patient has a good  understanding of the patient´s diagnosis, condition, and treatment plan as can be expected at this point. The vital signs have been stable. The patient´s condition is stable and appropriate for discharge from the emergency department.      The patient will pursue further outpatient evaluation with the primary care physician or other designated or consulting physician as outlined in the discharge instructions. They are agreeable to this plan of care and follow-up instructions have been explained in detail. The patient has received these instructions in written format and has expressed an understanding of the discharge instructions. The patient is aware that any significant change in condition or worsening of symptoms should prompt an immediate return to this or the closest emergency department or call to 911.  I have explained discharge medications and the need for follow up with the patient/caretakers. This was also printed in the discharge instructions. Patient was discharged with the following medications and follow up:      Medication List        New Prescriptions      ondansetron ODT 4 MG disintegrating tablet  Commonly known as: ZOFRAN-ODT  Place 1 tablet on the tongue Every 8 (Eight) Hours As Needed for Vomiting or Nausea.     oxyCODONE-acetaminophen 7.5-325 MG per tablet  Commonly known as: PERCOCET  Take 1 tablet by mouth Every 8 (Eight) Hours As Needed (Pain).     tamsulosin 0.4 MG capsule 24 hr capsule  Commonly known as: FLOMAX  Take 1 capsule by mouth Daily.               Where to Get Your Medications        These medications were sent to [facility] - [place], [place] - [address], [address] - [phone]  - [phone] FX  [address] [facility], [facility] [address], [facility] [address]      Phone: [phone]   ondansetron ODT 4 MG disintegrating tablet  oxyCODONE-acetaminophen 7.5-325 MG per tablet  tamsulosin 0.4 MG capsule 24 hr capsule      Santosh Gutierrez MD  [address] [address] [phone]    In 3 days         Final diagnoses:   Ureteral calculi   Ureteral colic        ED Disposition       ED Disposition   Discharge    Condition   Stable    Comment   --               This medical record created using voice recognition software.             Moahn Hays,   05/09/25 1734

## 2025-05-12 ENCOUNTER — TELEPHONE (OUTPATIENT)
Dept: UROLOGY | Age: 70
End: 2025-05-12
Payer: MEDICARE

## 2025-05-12 NOTE — TELEPHONE ENCOUNTER
Dr Pizarro office called and states patient was seen in the ER on  5/9 with an 8mm kidney stone and pain patient was prescribed oxycodone in the ER and the ER did not put a referral in and Dr aquino was he next in rotation. Patient would like a call. Please advise!

## 2025-05-13 ENCOUNTER — PREP FOR SURGERY (OUTPATIENT)
Dept: OTHER | Facility: HOSPITAL | Age: 70
End: 2025-05-13
Payer: MEDICARE

## 2025-05-13 ENCOUNTER — OFFICE VISIT (OUTPATIENT)
Dept: UROLOGY | Age: 70
End: 2025-05-13
Payer: MEDICARE

## 2025-05-13 ENCOUNTER — ANESTHESIA EVENT (OUTPATIENT)
Dept: PERIOP | Facility: HOSPITAL | Age: 70
End: 2025-05-13
Payer: MEDICARE

## 2025-05-13 VITALS — HEIGHT: 69 IN | BODY MASS INDEX: 30.51 KG/M2 | WEIGHT: 206 LBS

## 2025-05-13 DIAGNOSIS — N20.1 URETERAL STONE: Primary | ICD-10-CM

## 2025-05-13 DIAGNOSIS — N20.0 NEPHROLITHIASIS: Primary | ICD-10-CM

## 2025-05-13 RX ORDER — SODIUM CHLORIDE 0.9 % (FLUSH) 0.9 %
10 SYRINGE (ML) INJECTION AS NEEDED
Status: CANCELLED | OUTPATIENT
Start: 2025-05-13

## 2025-05-13 RX ORDER — SODIUM CHLORIDE 9 MG/ML
40 INJECTION, SOLUTION INTRAVENOUS AS NEEDED
Status: CANCELLED | OUTPATIENT
Start: 2025-05-13

## 2025-05-13 RX ORDER — SODIUM CHLORIDE 0.9 % (FLUSH) 0.9 %
3 SYRINGE (ML) INJECTION EVERY 12 HOURS SCHEDULED
Status: CANCELLED | OUTPATIENT
Start: 2025-05-13

## 2025-05-13 NOTE — PROGRESS NOTES
Chief Complaint: Urologic complaint    Subjective         History of Present Illness  Jean-Pierre Anaya is a 69 y.o. male       NephroLithiasis        Patient having intermittent pain        5/9/25 CT abdomen/pelvis with - 8 mm proximal left ureter.  3 mm and 4 mm nonobstructing stones in the left kidney.  Right kidney has a 3 mm and 2 mm stone.  Nonobstructing.  Images reviewed.   5/9/25-20 150 RBCs, no bacteria, 1.5, GFR 49        Pereira last seen 8/24      Voiding okay.  No family of prostate cancer.    2020 TURP    History of nephrolithiasis    On potassium citrate and allopurinol    Has IPP working okay        Lithotripsy x 3    No cardiopulmonary history.  ASA 81      PSA    8/24    0.61  7/23    0.58          Objective     Past Medical History:   Diagnosis Date    Abnormal EKG     Arthritis     Asthma     Bacteriuria 02/17/2021    Benign essential hypertension     Bilateral carpal tunnel syndrome 01/29/2019    BPH (benign prostatic hyperplasia)     Chronic kidney disease     stage 3    Diabetes     Diabetes mellitus type 2, noninsulin dependent     ED (erectile dysfunction)     HLD (hyperlipidemia)     HTN (hypertension)     Hyperlipemia     Hypogonadism in male     Leg swelling     Neuropathy     Preop examination 01/29/2019    BILATERAL CTS WANTS TO HAVE RIGHT CTR BEFORE LEFT    Prostate disorder     Renal calculus or stone     Seasonal allergies        Past Surgical History:   Procedure Laterality Date    CARDIAC CATHETERIZATION      CARPAL TUNNEL RELEASE Right 01/18/2019    CTR    CATARACT EXTRACTION, BILATERAL  11/2021    COLONOSCOPY  2013    COLONOSCOPY N/A 8/11/2023    Procedure: COLONOSCOPY WITH POLYPECTOMY;  Surgeon: Luis Felipe Ferrell MD;  Location: [facility] ENDOSCOPY;  Service: Gastroenterology;  Laterality: N/A;  COLON POLYPS    CYSTOSCOPY      ENDOSCOPY N/A 07/19/2022    Procedure: ESOPHAGOGASTRODUODENOSCOPY WITH BIOPSIES;  Surgeon: Luis Felipe Ferrell MD;  Location: [facility] ENDOSCOPY;   Service: Gastroenterology;  Laterality: N/A;  GASTRIC INLET PATCH    EYE SURGERY      EYE IMPLANT    KIDNEY STONE SURGERY      PENILE PROSTHESIS IMPLANT Bilateral 12/13/2021    Procedure: PENILE PROSTHESIS PLACEMENT;  Surgeon: Serafin Pereira MD;  Location: [facility] MAIN OR;  Service: Urology;  Laterality: Bilateral;    PROSTATE LASER ABLATION/ENUCLEATION      TUMOR REMOVAL      fatty tumor- from back    TURP / TRANSURETHRAL INCISION / DRAINAGE PROSTATE  04/26/2017             Vital Signs:   There were no vitals taken for this visit.     Physical exam    Alert and orient x3  Well appearing, well developed, in no acute distress   Unlabored respirations  Nontender/nondistended      Grossly oriented to person, place and time, judgment is intact, normal mood and affect              Assessment and Plan    Diagnoses and all orders for this visit:    1. Nephrolithiasis (Primary)        CT images and read reviewed discussed with the patient    Records reviewed and summarized in the chart    Patient has a large stone with hydronephrosis and intermittent pain we will going get him scheduled for cystoscopy with left ureteroscopy with laser and left renal stent placement.  Risks and benefits were discussed including bleeding, infection and damage to the urinary system.  We also discussed the risk of anesthesia up to and including death.  Patient voiced understanding and would like to proceed.       Patient is on Mounjaro.  We discussed this has some increased risk of delayed gastric emptying and increased risk with anesthesia.  Patient in severe pain with obstructive kidney.  Will proceed with urgent procedure.  Patient voiced understanding

## 2025-05-14 ENCOUNTER — ANESTHESIA (OUTPATIENT)
Dept: PERIOP | Facility: HOSPITAL | Age: 70
End: 2025-05-14
Payer: MEDICARE

## 2025-05-14 ENCOUNTER — APPOINTMENT (OUTPATIENT)
Dept: GENERAL RADIOLOGY | Facility: HOSPITAL | Age: 70
End: 2025-05-14
Payer: MEDICARE

## 2025-05-14 ENCOUNTER — HOSPITAL ENCOUNTER (OUTPATIENT)
Facility: HOSPITAL | Age: 70
Discharge: HOME | End: 2025-05-14
Attending: UROLOGY | Admitting: UROLOGY
Payer: MEDICARE

## 2025-05-14 VITALS
OXYGEN SATURATION: 99 % | RESPIRATION RATE: 14 BRPM | HEART RATE: 56 BPM | TEMPERATURE: 96.8 F | SYSTOLIC BLOOD PRESSURE: 140 MMHG | DIASTOLIC BLOOD PRESSURE: 41 MMHG | WEIGHT: 207.89 LBS | BODY MASS INDEX: 29.76 KG/M2 | HEIGHT: 70 IN

## 2025-05-14 DIAGNOSIS — N20.0 NEPHROLITHIASIS: Primary | ICD-10-CM

## 2025-05-14 DIAGNOSIS — N20.1 URETERAL STONE: ICD-10-CM

## 2025-05-14 LAB
GLUCOSE BLDC GLUCOMTR-MCNC: 133 MG/DL (ref 70–99)
GLUCOSE BLDC GLUCOMTR-MCNC: 97 MG/DL (ref 70–99)
LAB AP CASE REPORT: NORMAL
LAB AP CLINICAL INFORMATION: NORMAL
PATH REPORT.FINAL DX SPEC: NORMAL
PATH REPORT.GROSS SPEC: NORMAL

## 2025-05-14 PROCEDURE — A9270 NON-COVERED ITEM OR SERVICE: HCPCS | Performed by: ANESTHESIOLOGY

## 2025-05-14 PROCEDURE — 25010000002 DEXAMETHASONE PER 1 MG: Performed by: NURSE ANESTHETIST, CERTIFIED REGISTERED

## 2025-05-14 PROCEDURE — 25010000002 CEFAZOLIN PER 500 MG: Performed by: UROLOGY

## 2025-05-14 PROCEDURE — 25010000002 MIDAZOLAM PER 1MG: Performed by: ANESTHESIOLOGY

## 2025-05-14 PROCEDURE — 63710000001 OXYCODONE 5 MG TABLET: Performed by: NURSE ANESTHETIST, CERTIFIED REGISTERED

## 2025-05-14 PROCEDURE — 82365 CALCULUS SPECTROSCOPY: CPT | Performed by: UROLOGY

## 2025-05-14 PROCEDURE — 25010000002 METOCLOPRAMIDE PER 10 MG: Performed by: ANESTHESIOLOGY

## 2025-05-14 PROCEDURE — C1894 INTRO/SHEATH, NON-LASER: HCPCS | Performed by: UROLOGY

## 2025-05-14 PROCEDURE — 63710000001 ACETAMINOPHEN EXTRA STRENGTH 500 MG TABLET: Performed by: ANESTHESIOLOGY

## 2025-05-14 PROCEDURE — 88300 SURGICAL PATH GROSS: CPT | Performed by: UROLOGY

## 2025-05-14 PROCEDURE — 52356 CYSTO/URETERO W/LITHOTRIPSY: CPT | Performed by: UROLOGY

## 2025-05-14 PROCEDURE — 25010000002 PROPOFOL 10 MG/ML EMULSION: Performed by: NURSE ANESTHETIST, CERTIFIED REGISTERED

## 2025-05-14 PROCEDURE — A9270 NON-COVERED ITEM OR SERVICE: HCPCS | Performed by: NURSE ANESTHETIST, CERTIFIED REGISTERED

## 2025-05-14 PROCEDURE — C1758 CATHETER, URETERAL: HCPCS | Performed by: UROLOGY

## 2025-05-14 PROCEDURE — 25810000003 LACTATED RINGERS PER 1000 ML: Performed by: ANESTHESIOLOGY

## 2025-05-14 PROCEDURE — 25010000002 LIDOCAINE PF 2% 2 % SOLUTION: Performed by: NURSE ANESTHETIST, CERTIFIED REGISTERED

## 2025-05-14 PROCEDURE — C1769 GUIDE WIRE: HCPCS | Performed by: UROLOGY

## 2025-05-14 PROCEDURE — 76000 FLUOROSCOPY <1 HR PHYS/QHP: CPT

## 2025-05-14 PROCEDURE — 25010000002 ONDANSETRON PER 1 MG: Performed by: NURSE ANESTHETIST, CERTIFIED REGISTERED

## 2025-05-14 PROCEDURE — 82948 REAGENT STRIP/BLOOD GLUCOSE: CPT

## 2025-05-14 PROCEDURE — 25010000002 FENTANYL CITRATE (PF) 50 MCG/ML SOLUTION: Performed by: NURSE ANESTHETIST, CERTIFIED REGISTERED

## 2025-05-14 PROCEDURE — 25010000002 SUGAMMADEX 200 MG/2ML SOLUTION: Performed by: NURSE ANESTHETIST, CERTIFIED REGISTERED

## 2025-05-14 PROCEDURE — C2617 STENT, NON-COR, TEM W/O DEL: HCPCS | Performed by: UROLOGY

## 2025-05-14 PROCEDURE — 25010000002 FAMOTIDINE 10 MG/ML SOLUTION: Performed by: ANESTHESIOLOGY

## 2025-05-14 PROCEDURE — 82948 REAGENT STRIP/BLOOD GLUCOSE: CPT | Performed by: NURSE ANESTHETIST, CERTIFIED REGISTERED

## 2025-05-14 DEVICE — URETERAL STENT
Type: IMPLANTABLE DEVICE | Site: URETER | Status: FUNCTIONAL
Brand: CONTOUR™

## 2025-05-14 RX ORDER — PROMETHAZINE HYDROCHLORIDE 12.5 MG/1
12.5 TABLET ORAL ONCE AS NEEDED
Status: DISCONTINUED | OUTPATIENT
Start: 2025-05-14 | End: 2025-05-14 | Stop reason: HOSPADM

## 2025-05-14 RX ORDER — PROMETHAZINE HYDROCHLORIDE 25 MG/1
25 TABLET ORAL ONCE AS NEEDED
Status: DISCONTINUED | OUTPATIENT
Start: 2025-05-14 | End: 2025-05-14 | Stop reason: HOSPADM

## 2025-05-14 RX ORDER — SODIUM CHLORIDE 0.9 % (FLUSH) 0.9 %
10 SYRINGE (ML) INJECTION AS NEEDED
Status: DISCONTINUED | OUTPATIENT
Start: 2025-05-14 | End: 2025-05-14 | Stop reason: HOSPADM

## 2025-05-14 RX ORDER — DEXAMETHASONE SODIUM PHOSPHATE 4 MG/ML
INJECTION, SOLUTION INTRA-ARTICULAR; INTRALESIONAL; INTRAMUSCULAR; INTRAVENOUS; SOFT TISSUE AS NEEDED
Status: DISCONTINUED | OUTPATIENT
Start: 2025-05-14 | End: 2025-05-14 | Stop reason: SURG

## 2025-05-14 RX ORDER — SODIUM CHLORIDE 0.9 % (FLUSH) 0.9 %
3 SYRINGE (ML) INJECTION EVERY 12 HOURS SCHEDULED
Status: DISCONTINUED | OUTPATIENT
Start: 2025-05-14 | End: 2025-05-14 | Stop reason: HOSPADM

## 2025-05-14 RX ORDER — PROPOFOL 10 MG/ML
VIAL (ML) INTRAVENOUS AS NEEDED
Status: DISCONTINUED | OUTPATIENT
Start: 2025-05-14 | End: 2025-05-14 | Stop reason: SURG

## 2025-05-14 RX ORDER — HYDROCODONE BITARTRATE AND ACETAMINOPHEN 5; 325 MG/1; MG/1
1 TABLET ORAL EVERY 6 HOURS PRN
Qty: 12 TABLET | Refills: 0 | Status: SHIPPED | OUTPATIENT
Start: 2025-05-14

## 2025-05-14 RX ORDER — ACETAMINOPHEN 325 MG/1
650 TABLET ORAL ONCE
Status: DISCONTINUED | OUTPATIENT
Start: 2025-05-14 | End: 2025-05-14 | Stop reason: HOSPADM

## 2025-05-14 RX ORDER — ACETAMINOPHEN 500 MG
1000 TABLET ORAL ONCE
Status: COMPLETED | OUTPATIENT
Start: 2025-05-14 | End: 2025-05-14

## 2025-05-14 RX ORDER — SODIUM CHLORIDE 9 MG/ML
40 INJECTION, SOLUTION INTRAVENOUS AS NEEDED
Status: DISCONTINUED | OUTPATIENT
Start: 2025-05-14 | End: 2025-05-14 | Stop reason: HOSPADM

## 2025-05-14 RX ORDER — FENTANYL CITRATE 50 UG/ML
INJECTION, SOLUTION INTRAMUSCULAR; INTRAVENOUS AS NEEDED
Status: DISCONTINUED | OUTPATIENT
Start: 2025-05-14 | End: 2025-05-14 | Stop reason: SURG

## 2025-05-14 RX ORDER — HYDROCODONE BITARTRATE AND ACETAMINOPHEN 5; 325 MG/1; MG/1
1 TABLET ORAL ONCE AS NEEDED
Status: DISCONTINUED | OUTPATIENT
Start: 2025-05-14 | End: 2025-05-14 | Stop reason: HOSPADM

## 2025-05-14 RX ORDER — ONDANSETRON 2 MG/ML
4 INJECTION INTRAMUSCULAR; INTRAVENOUS ONCE AS NEEDED
Status: DISCONTINUED | OUTPATIENT
Start: 2025-05-14 | End: 2025-05-14 | Stop reason: HOSPADM

## 2025-05-14 RX ORDER — FAMOTIDINE 10 MG/ML
20 INJECTION, SOLUTION INTRAVENOUS
Status: COMPLETED | OUTPATIENT
Start: 2025-05-14 | End: 2025-05-14

## 2025-05-14 RX ORDER — ONDANSETRON 4 MG/1
4 TABLET, ORALLY DISINTEGRATING ORAL ONCE AS NEEDED
Status: DISCONTINUED | OUTPATIENT
Start: 2025-05-14 | End: 2025-05-14 | Stop reason: HOSPADM

## 2025-05-14 RX ORDER — SODIUM CHLORIDE, SODIUM LACTATE, POTASSIUM CHLORIDE, CALCIUM CHLORIDE 600; 310; 30; 20 MG/100ML; MG/100ML; MG/100ML; MG/100ML
9 INJECTION, SOLUTION INTRAVENOUS CONTINUOUS PRN
Status: DISCONTINUED | OUTPATIENT
Start: 2025-05-14 | End: 2025-05-14 | Stop reason: HOSPADM

## 2025-05-14 RX ORDER — LIDOCAINE HYDROCHLORIDE 20 MG/ML
INJECTION, SOLUTION EPIDURAL; INFILTRATION; INTRACAUDAL; PERINEURAL AS NEEDED
Status: DISCONTINUED | OUTPATIENT
Start: 2025-05-14 | End: 2025-05-14 | Stop reason: SURG

## 2025-05-14 RX ORDER — EPHEDRINE SULFATE 50 MG/ML
INJECTION INTRAVENOUS AS NEEDED
Status: DISCONTINUED | OUTPATIENT
Start: 2025-05-14 | End: 2025-05-14 | Stop reason: SURG

## 2025-05-14 RX ORDER — MIDAZOLAM HYDROCHLORIDE 2 MG/2ML
0.5 INJECTION, SOLUTION INTRAMUSCULAR; INTRAVENOUS ONCE
Status: COMPLETED | OUTPATIENT
Start: 2025-05-14 | End: 2025-05-14

## 2025-05-14 RX ORDER — ROCURONIUM BROMIDE 10 MG/ML
INJECTION, SOLUTION INTRAVENOUS AS NEEDED
Status: DISCONTINUED | OUTPATIENT
Start: 2025-05-14 | End: 2025-05-14 | Stop reason: SURG

## 2025-05-14 RX ORDER — OXYCODONE HYDROCHLORIDE 5 MG/1
5 TABLET ORAL
Status: COMPLETED | OUTPATIENT
Start: 2025-05-14 | End: 2025-05-14

## 2025-05-14 RX ORDER — PROMETHAZINE HYDROCHLORIDE 25 MG/1
25 SUPPOSITORY RECTAL ONCE AS NEEDED
Status: DISCONTINUED | OUTPATIENT
Start: 2025-05-14 | End: 2025-05-14 | Stop reason: HOSPADM

## 2025-05-14 RX ORDER — METOCLOPRAMIDE HYDROCHLORIDE 5 MG/ML
10 INJECTION INTRAMUSCULAR; INTRAVENOUS ONCE
Status: COMPLETED | OUTPATIENT
Start: 2025-05-14 | End: 2025-05-14

## 2025-05-14 RX ORDER — MAGNESIUM HYDROXIDE 1200 MG/15ML
LIQUID ORAL AS NEEDED
Status: DISCONTINUED | OUTPATIENT
Start: 2025-05-14 | End: 2025-05-14 | Stop reason: HOSPADM

## 2025-05-14 RX ORDER — ONDANSETRON 2 MG/ML
INJECTION INTRAMUSCULAR; INTRAVENOUS AS NEEDED
Status: DISCONTINUED | OUTPATIENT
Start: 2025-05-14 | End: 2025-05-14 | Stop reason: SURG

## 2025-05-14 RX ADMIN — ONDANSETRON 4 MG: 2 INJECTION INTRAMUSCULAR; INTRAVENOUS at 11:48

## 2025-05-14 RX ADMIN — SODIUM CHLORIDE 2000 MG: 9 INJECTION, SOLUTION INTRAVENOUS at 11:48

## 2025-05-14 RX ADMIN — FAMOTIDINE 20 MG: 10 INJECTION INTRAVENOUS at 11:34

## 2025-05-14 RX ADMIN — OXYCODONE 5 MG: 5 TABLET ORAL at 13:03

## 2025-05-14 RX ADMIN — FENTANYL CITRATE 50 MCG: 50 INJECTION, SOLUTION INTRAMUSCULAR; INTRAVENOUS at 11:45

## 2025-05-14 RX ADMIN — MIDAZOLAM HYDROCHLORIDE 0.5 MG: 1 INJECTION, SOLUTION INTRAMUSCULAR; INTRAVENOUS at 11:34

## 2025-05-14 RX ADMIN — METOCLOPRAMIDE 10 MG: 5 INJECTION, SOLUTION INTRAMUSCULAR; INTRAVENOUS at 11:35

## 2025-05-14 RX ADMIN — SODIUM CHLORIDE, POTASSIUM CHLORIDE, SODIUM LACTATE AND CALCIUM CHLORIDE: 600; 310; 30; 20 INJECTION, SOLUTION INTRAVENOUS at 11:12

## 2025-05-14 RX ADMIN — EPHEDRINE SULFATE 10 MG: 50 INJECTION INTRAVENOUS at 12:18

## 2025-05-14 RX ADMIN — LIDOCAINE HYDROCHLORIDE 60 MG: 20 INJECTION, SOLUTION INTRAVENOUS at 11:45

## 2025-05-14 RX ADMIN — ACETAMINOPHEN 1000 MG: 500 TABLET ORAL at 11:33

## 2025-05-14 RX ADMIN — DEXAMETHASONE SODIUM PHOSPHATE 4 MG: 4 INJECTION, SOLUTION INTRAMUSCULAR; INTRAVENOUS at 11:48

## 2025-05-14 RX ADMIN — OXYCODONE 5 MG: 5 TABLET ORAL at 14:29

## 2025-05-14 RX ADMIN — PROPOFOL 150 MG: 10 INJECTION, EMULSION INTRAVENOUS at 11:45

## 2025-05-14 RX ADMIN — SUGAMMADEX 200 MG: 100 INJECTION, SOLUTION INTRAVENOUS at 12:36

## 2025-05-14 RX ADMIN — ROCURONIUM BROMIDE 70 MG: 10 INJECTION, SOLUTION INTRAVENOUS at 11:45

## 2025-05-14 RX ADMIN — SODIUM CHLORIDE, POTASSIUM CHLORIDE, SODIUM LACTATE AND CALCIUM CHLORIDE 9 ML/HR: 600; 310; 30; 20 INJECTION, SOLUTION INTRAVENOUS at 11:34

## 2025-05-14 RX ADMIN — EPHEDRINE SULFATE 20 MG: 50 INJECTION INTRAVENOUS at 12:27

## 2025-05-14 NOTE — OP NOTE
URETEROSCOPY LASER LITHOTRIPSY WITH STENT INSERTION  Procedure Report    Patient Name:  Jean-Pierre Anaya  YOB: 1955    Date of Surgery:  5/14/2025      Pre-op Diagnosis:   Ureteral stone [N20.1]       Postop diagnosis:    Same    Procedure/CPT® Codes:  NH CYSTO/URETERO W/LITHOTRIPSY &INDWELL STENT INSRT [43932]    Procedure(s):    Cystoscopy   left ureteroscopy with laser with basket stone extraction  Left ureteral stent placement 6 x 26 with string        Staff:  Surgeon(s):  Santosh Gutierrez MD         Anesthesia: General    Estimated Blood Loss: minimal    Implants:    Implant Name Type Inv. Item Serial No.  Lot No. LRB No. Used Action   STNT URETRL CONTOUR PERCUFLX NO GW 6F 26CM - [id number] Stent STNT URETRL CONTOUR PERCUFLX NO GW 6F 26CM  [employer] [id number] Left 1 Implanted       Specimen:          Specimens       ID Source Type Tests Collected By Collected At Frozen?    A Kidney, Left Calculus TISSUE PATHOLOGY EXAM  STONE ANALYSIS   Santosh Gutierrez MD 5/14/25 2348     Description: Left Ureteral Stone                Findings:     2 cm prostate with wide open TUR defect    Bladder with moderate trabeculation, no pathology    All stones removed from the left side,  Stent placed with string      Complications: none    Description of Procedure:     After informed consent patient taken to the operating room.  Patient was laid supine and placed under general anesthesia by the anesthesia team.  At this point patient was placed in dorsal lithotomy position and prepped and draped in normal sterile fashion.  A multidisciplinary timeout was undertaken documenting the correct patient site and procedure.  At this point a 22 rigid cystoscope was placed into the urethra . Bladder was normal.  At this point a Glidewire was placed up the left left     ureter without any issue under fluoroscopic guidance.  I then placed a dual-lumen catheter and a stiff wire alongside the  Glidewire under fluoroscopic guidance.  The dual-lumen was removed and a ureteral access sheath was placed into the distal ureter without any problem.  I removed the obturator and wire and placed a flexible ureteroscope up the ueter.  The stone was identified.  Stone was lasered into multiple small fragments with a 272 µm laser fiber and then these pieces were basketed out with a no tip nitinol basket.  I then took the flexible ureteroscope up and check the rest of the ureter and the upper collecting system.  There were no further stones.  Brought the actual sheath out under direct vision and there was no further stones.      Left  side was free of stones.  A 6 x 26 ureteral stent was then placed over the Glidewire through a rigid cystoscope without issue and had a good curl in the bladder under direct vision and a good curl in the left   renal pelvis under fluoroscopy.  Bladder was drained.  Patient tolerated the procedure well, he was taken to the postanesthesia care unit without issue.      String left on stent      Santosh Gutierrez MD     Date: 5/14/2025  Time: 12:40 EDT

## 2025-05-14 NOTE — DISCHARGE INSTRUCTIONS
DISCHARGE INSTRUCTIONS  Ureteroscopy Lasertripsy  Stent Placement      For your surgery you had:  General anesthesia (you may have a sore throat for the first 24 hours)     You may experience dizziness, drowsiness, or lightheadedness for several hours following surgery.  Do not stay alone today or tonight.  Limit your activity for 24 hours.  You should not drive or operate machinery, drink alcohol, or sign legally binding documents for 24 hours or while you are taking pain medication.  Resume your diet slowly.  Follow any special dietary instructions you may have been given by your doctor.     NOTIFY YOUR DOCTOR IF YOU EXPERIENCE ANY OF THE FOLLOWING:  Temperature greater than 101 degrees Fahrenheit  Shaking Chills  Redness or excessive drainage from incision  Nausea, vomiting and/or pain that is not controlled by prescribed medications  Increase in bleeding or bleeding that is excessive  Unable to urinate in 6 hours after surgery  If unable to reach your doctor, please go to the closest Emergency Room  Strain urine if instructed by physician.  Collect any fragments and take with you on your scheduled appointment. You may pass stone pieces or small blood clots.  Blood in your urine is normal.  It could be light pink to cherry color.  Drink 6-8 glasses of fluid each day to assist with passing of stone fragments.  Back pain is common.  It may feel like a dull ache or back spasm.  Urine will be bloody for several days.  Slight redness or bruising may be noticed on treated side.  If you have difficulty urinating, try sitting in a bathtub of warm water.    If you have a stent, it must be managed by your urologist.  Do NOT forget.  Medications per physician instructions as indicated on Discharge Medication Information Sheet.    Last dose of pain medication was given at:   .    SPECIAL INSTRUCTIONS:                                              URETERAL STENT TEACHING SHEET   Frequently Asked Questions about Ureteral  Stents          What is a ureteral stent?  A ureteral stent is a small, soft tube that is about 10-12 inches long and about as big around as a swizzle stick. It is placed in the ureter, which is the muscular tube that drains urine from the kidney to the bladder.  Each end of the stent is shaped like a pigtail. One end of the tube sits inside the kidney, and the other end sits in the bladder. The purpose of the stent is to hold the ureter open and maintain proper drainage of urine.  It usually is temporary but may be used long term.  This decision will be made by your doctor.  When is the stent used?  A stent is used in a number of situations.  A stent is placed if the doctor is concerned that urine might not drain well through the ureter, due to blockage or as a reaction to surgery.  Stents usually will be placed in a ureter that has been irritated during a procedure that involves removal of a stone.  Stents for this reason are usually left in place for about a week.  These stents ensure that the ureter does not spasm and collapse after the trauma of the procedure.  Does the stent cause any symptoms?  Many patients do feel the stent.  Most commonly there is bladder irritation, typically causing frequent and/or uncomfortable urination and a sensation of pressure over the bladder or in the lower abdomen. Bloody urine is common. Some patients experience pain in the kidney during urination. There can be urinary tract infections associated with the stent so it is very important that you practice good hygiene. Once the stent is removed, all of the symptoms associated with the stent will quite rapidly disappear (oftentimes immediately). While the stent is in place, you may carry on with most normal activities, including work.  Strenuous activity may cause discomfort. Showering is preferred to tub baths while your stent is in place. If you must take a tub bath, do not use bubble baths or oils as they may lead to infection.        When should the stent be removed?  In some cases the stent can be removed just a few days after the procedure, while in other cases your doctor may recommend that it stay in place for longer periods of time.  You must follow-up with your doctor as directed when you have a stent since stents left in place for too long can lead to blockage, stone formation, urinary infections and ultimately, kidney failure if they are not removed. If your stent passes by itself when you urinate, or you inadvertently pull the string and begin to have large amounts of urine leakage, follow the procedure below to remove the stent.  How is the stent removed?  In some instances, your doctor may decide to leave a string on the stent.  The string is attached to the stent and the string comes out of the urethra (the natural hole where urine exits the body).  The doctor may tell you to remove the stent at home on a given day.  Stents with the string may be removed in a matter of seconds by pulling on the string.  When removing the stent, constant, steady force should be applied, to avoid starting and stopping. Something should be placed below the patient to catch any urine that leaks during removal.  You may choose to remove the stent in the shower, just be sure to have someone close at hand in case you become weak or dizzy.  After the stent is removed, it should be placed in a sealed bag and taken with you to your next office visit.  On the rare occasion that the string breaks and the stent doesn't come out, you should call your doctors office. You should urinate within 4-6 hours after your stent is removed. For this reason, your stent should be removed early in the day.  If you are unable to urinate within 6 hours, call your doctor.   Stents without a string can be removed in the office using a cystoscope. Cystoscopy involves placement of a small flexible tube through the urethra (the hole where urine exits the body). The procedure,  which usually only takes a few minutes and causes little discomfort, is performed in the office. Most patients tolerate having the stent removed using only a topical anesthetic instilled into the urethra. Since no IV line is inserted and there is no anesthesia, you do not have to be accompanied by anyone else and you can eat normally before and after the procedure.  Your doctor may choose to have you return to the hospital to have your stent removed. In this case, you will receive anesthesia and must not eat or drink anything after midnight.

## 2025-05-14 NOTE — ANESTHESIA POSTPROCEDURE EVALUATION
Patient: Jean-Pierre Anaya    Procedure Summary       Date: 05/14/25 Room / Location: [facility] OR  / [facility] MAIN OR    Anesthesia Start: 1141 Anesthesia Stop: 1248    Procedure: Cystoscopy with left ureteroscopy with laser and left ureteral stent placement.  Look in bladder, laser stone in left  kidney.  Place tube/stent in the left ureter (Left) Diagnosis:       Ureteral stone      (Ureteral stone [N20.1])    Surgeons: Santosh Gutierrez MD Provider: Toan Cross MD    Anesthesia Type: general ASA Status: 3 - Emergent            Anesthesia Type: general    Vitals  Vitals Value Taken Time   /69 05/14/25 13:15   Temp 36.3 °C (97.4 °F) 05/14/25 13:15   Pulse 58 05/14/25 13:16   Resp 10 05/14/25 13:15   SpO2 95 % 05/14/25 13:16   Vitals shown include unfiled device data.        Post Anesthesia Care and Evaluation    Patient location during evaluation: bedside  Patient participation: complete - patient participated  Level of consciousness: awake  Pain management: adequate    Airway patency: patent  PONV Status: none  Cardiovascular status: acceptable and stable  Respiratory status: acceptable  Hydration status: acceptable

## 2025-05-14 NOTE — ANESTHESIA PREPROCEDURE EVALUATION
Anesthesia Evaluation     Patient summary reviewed and Nursing notes reviewed   no history of anesthetic complications:   NPO Solid Status: > 8 hours  NPO Liquid Status: > 2 hours           Airway   Mallampati: II  TM distance: >3 FB  Neck ROM: full  No difficulty expected  Dental - normal exam     Pulmonary - normal exam    breath sounds clear to auscultation  (+) asthma (mild),  Cardiovascular - normal exam    Rhythm: regular  Rate: normal    (+) hypertension well controlled 2 medications or greater, hyperlipidemia      Neuro/Psych  (+) numbness  GI/Hepatic/Renal/Endo    (+) obesity, renal disease- stones and CRI, diabetes mellitus type 2    Musculoskeletal     Abdominal    Substance History - negative use     OB/GYN negative ob/gyn ROS         Other   arthritis,     ROS/Med Hx Other: 10/3/24 ECHO  Left Ventricle Left ventricular systolic function is normal. Calculated left ventricular EF = 54.4%     Left ventricular wall thickness is consistent with concentric hypertrophy.  Right Atrium Inferior vena cava not well visualized.  Aortic Valve The aortic valve is abnormal in structure. There is calcification of the aortic valve. Mild aortic valve stenosis is present.  Mitral Valve Trace mitral valve regurgitation is present.  Tricuspid Valve Trace tricuspid valve regurgitation is present.                    Anesthesia Plan    ASA 3 - emergent     general   Rapid sequence  (Mounjaro 10 MG/0.5ML solution pen-injector pen last dose 5/10/2025.  Pepcid and reglan ordered.  Dr Gutierrez states case is emergency and cannot wait typical interval after last GLP-1 dose.)  intravenous induction     Anesthetic plan, risks, benefits, and alternatives have been provided, discussed and informed consent has been obtained with: patient.  Pre-procedure education provided      CODE STATUS:

## 2025-05-14 NOTE — H&P
[facility]   UROLOGY HISTORY AND PHYSICAL    Patient Name: Jean-Pierre Anaya  : 1955  MRN: [id number]  Primary Care Physician:  Ian Wilson MD  Date of admission: 2025    Subjective   Subjective     Chief Complaint:     Ureteral stone    HPI:    Jean-Pierre Anaya is a 69 y.o. male     Ureteral stone    No change in H&P    Review of Systems     10 systems reviewed and are negative other than what is listed in HPI    Personal History     Past Medical History:   Diagnosis Date    Abnormal EKG     Arthritis     Asthma     Bacteriuria 2021    Benign essential hypertension     Bilateral carpal tunnel syndrome 2019    BPH (benign prostatic hyperplasia)     Chronic kidney disease     stage 3    Diabetes     Diabetes mellitus type 2, noninsulin dependent     ED (erectile dysfunction)     HLD (hyperlipidemia)     HTN (hypertension)     Hyperlipemia     Hypogonadism in male     Leg swelling     Neuropathy     Preop examination 2019    BILATERAL CTS WANTS TO HAVE RIGHT CTR BEFORE LEFT    Prostate disorder     Renal calculus or stone     Seasonal allergies        Past Surgical History:   Procedure Laterality Date    CARDIAC CATHETERIZATION      CARPAL TUNNEL RELEASE Right 2019    CTR    CATARACT EXTRACTION, BILATERAL  2021    COLONOSCOPY      COLONOSCOPY N/A 2023    Procedure: COLONOSCOPY WITH POLYPECTOMY;  Surgeon: Luis Felipe Ferrell MD;  Location: [facility] ENDOSCOPY;  Service: Gastroenterology;  Laterality: N/A;  COLON POLYPS    CYSTOSCOPY      ENDOSCOPY N/A 2022    Procedure: ESOPHAGOGASTRODUODENOSCOPY WITH BIOPSIES;  Surgeon: Luis Felipe Ferrell MD;  Location: [facility] ENDOSCOPY;  Service: Gastroenterology;  Laterality: N/A;  GASTRIC INLET PATCH    EYE SURGERY      EYE IMPLANT    KIDNEY STONE SURGERY      PENILE PROSTHESIS IMPLANT Bilateral 2021    Procedure: PENILE PROSTHESIS PLACEMENT;  Surgeon: Serafin Pereira MD;  Location: [facility] MAIN OR;   Service: Urology;  Laterality: Bilateral;    PROSTATE LASER ABLATION/ENUCLEATION      TUMOR REMOVAL      fatty tumor- from back    TURP / TRANSURETHRAL INCISION / DRAINAGE PROSTATE  04/26/2017       Family History: family history includes Arthritis in his mother; Breast cancer in his mother; Kidney cancer in his father; Lung cancer in his father; Osteoporosis in his mother. Otherwise pertinent FHx was reviewed and not pertinent to current issue.    Social History:  reports that he has never smoked. He has never been exposed to tobacco smoke. He has never used smokeless tobacco. He reports that he does not drink alcohol and does not use drugs.    Home Medications:  Azelastine HCl, Insulin Glargine (1 Unit Dial), Insulin Lispro (1 Unit Dial), Omega-3, Tirzepatide, albuterol sulfate HFA, allopurinol, aspirin, carvedilol, empagliflozin, fenofibrate, fexofenadine, gabapentin, losartan, metroNIDAZOLE, montelukast, ondansetron ODT, oxyCODONE-acetaminophen, potassium citrate, tamsulosin, and traMADol      Allergies:  No Known Allergies    Objective   Objective     Vitals:      Physical Exam    Constitutional: Awake, alert    Respiratory: Clear to auscultation bilaterally, nonlabored respirations    Cardiovascular: RRR, no murmurs, rubs, or gallops, palpable pedal pulses bilaterally   Gastrointestinal: Positive bowel sounds, soft, nontender, nondistended       Result Review    Result Review:  I have personally reviewed the results from the time of this admission to 5/14/2025 10:51 EDT and agree with these findings:  []  Laboratory  []  Microbiology  []  Radiology  []  EKG/Telemetry   []  Cardiology/Vascular   []  Pathology  []  Old records  []  Other:    Assessment & Plan   Assessment / Plan     Brief Patient Summary:  Jean-Pierre Anaya is a 69 y.o. male     Active Hospital Problems:  Active Hospital Problems    Diagnosis     **Ureteral stone          Cystoscopy with left ureteroscopy with laser and left ureteral stent  placement.  Risks and benefits were discussed including bleeding, infection and damage to the urinary system.  We also discussed the risk of anesthesia up to and including death.  Patient voiced understanding and would like to proceed.    Case is deemed emergency secondary to obstructive stone  With risk of renal damage, pain and sepsis      Electronically signed by Santosh Gutierrez MD, 05/14/25, 10:51 AM EDT.

## 2025-05-15 ENCOUNTER — TELEPHONE (OUTPATIENT)
Dept: UROLOGY | Age: 70
End: 2025-05-15
Payer: MEDICARE

## 2025-05-15 NOTE — TELEPHONE ENCOUNTER
----- Message from Armando OLGUIN sent at 5/15/2025  8:08 AM EDT -----  Pt needs a post op follow up appt after his ELENA that is scheduled on 6/16.

## 2025-05-24 LAB
COLOR STONE: NORMAL
COM MFR STONE: 100 %
LABORATORY COMMENT REPORT: NORMAL
SIZE STONE: NORMAL MM
SPEC SOURCE SUBJ: NORMAL
WT STONE: 126 MG

## 2025-06-16 ENCOUNTER — HOSPITAL ENCOUNTER (OUTPATIENT)
Dept: ULTRASOUND IMAGING | Facility: HOSPITAL | Age: 70
Discharge: HOME | End: 2025-06-16
Admitting: UROLOGY
Payer: MEDICARE

## 2025-06-16 DIAGNOSIS — N20.0 NEPHROLITHIASIS: ICD-10-CM

## 2025-06-16 PROCEDURE — 76775 US EXAM ABDO BACK WALL LIM: CPT

## 2025-06-20 NOTE — PROGRESS NOTES
Chief Complaint: Urologic complaint    Subjective         History of Present Illness  Jean-Pierre Anaya is a 69 y.o. male       Nephrolithiasis        Patient did have some pain with ultrasound but doing okay    No GH      Stent is out.    6/25 renal ultrasound - no hydronephrosis    5/25 calcium oxalate monohydrate 100          5/14/25 left ureteroscopy - stent with string-all stones removed from the left        Lithotripsy x 4      No cardiopulmonary history.  ASA 81        Currently on potassium citrate 20 mill equivalent twice daily -has been on this for several years            Previous      5/9/25 CT abdomen/pelvis with - 8 mm proximal left ureter.  3 mm and 4 mm nonobstructing stones in the left kidney.  Right kidney has a 3 mm and 2 mm stone.  Nonobstructing.  Images reviewed.   5/9/25-20 150 RBCs, no bacteria, 1.5, GFR 49        Pereira last seen 8/24      Voiding okay.  No family of prostate cancer.    2020 TURP    History of nephrolithiasis    On potassium citrate and allopurinol    Has IPP working okay        PSA    8/24    0.61  7/23    0.58          Objective                 Assessment and Plan    Diagnoses and all orders for this visit:    1. Nephrolithiasis (Primary)        The patient was counseled on the preventative measures of kidney stones today.  This included increasing fluid intake to make at least 1.5 ml daily, decreasing meat intake, decreasing salt intake and taking in a normal amount of calcium (1000 mg daily).  Information handout given on this today.      We also discussed the DASH diet today for stone prevention and handout was given          Patient is on allopurinol, we discussed today that he might talk with his PCP as far as urology is concerned he does not need allopurinol.  No history and no signs of uric acid kidney stone         will continue potassium citrate and follow-up in 10 weeks with 24-hour urine.

## 2025-06-24 ENCOUNTER — OFFICE VISIT (OUTPATIENT)
Dept: UROLOGY | Age: 70
End: 2025-06-24
Payer: MEDICARE

## 2025-06-24 VITALS — BODY MASS INDEX: 29.63 KG/M2 | WEIGHT: 207 LBS | HEIGHT: 70 IN

## 2025-06-24 DIAGNOSIS — N20.0 NEPHROLITHIASIS: Primary | ICD-10-CM

## 2025-06-24 PROCEDURE — 99213 OFFICE O/P EST LOW 20 MIN: CPT | Performed by: UROLOGY

## 2025-06-24 PROCEDURE — 1160F RVW MEDS BY RX/DR IN RCRD: CPT | Performed by: UROLOGY

## 2025-06-24 PROCEDURE — 1159F MED LIST DOCD IN RCRD: CPT | Performed by: UROLOGY

## 2025-08-26 ENCOUNTER — TRANSCRIBE ORDERS (OUTPATIENT)
Dept: ADMINISTRATIVE | Facility: HOSPITAL | Age: 70
End: 2025-08-26
Payer: MEDICARE

## 2025-08-26 DIAGNOSIS — M85.89 OTHER SPECIFIED DISORDERS OF BONE DENSITY AND STRUCTURE, MULTIPLE SITES: Primary | ICD-10-CM

## 2025-08-27 ENCOUNTER — TRANSCRIBE ORDERS (OUTPATIENT)
Dept: GENERAL RADIOLOGY | Facility: HOSPITAL | Age: 70
End: 2025-08-27
Payer: MEDICARE

## 2025-08-27 ENCOUNTER — HOSPITAL ENCOUNTER (OUTPATIENT)
Dept: GENERAL RADIOLOGY | Facility: HOSPITAL | Age: 70
Discharge: HOME | End: 2025-08-27
Payer: MEDICARE

## 2025-08-27 DIAGNOSIS — M25.512 PAIN, JOINT, SHOULDER, LEFT: Primary | ICD-10-CM

## 2025-08-27 DIAGNOSIS — M25.512 PAIN, JOINT, SHOULDER, LEFT: ICD-10-CM

## 2025-08-27 PROCEDURE — 73030 X-RAY EXAM OF SHOULDER: CPT

## (undated) DEVICE — ANTIBACTERIAL VIOLET BRAIDED (POLYGLACTIN 910), SYNTHETIC ABSORBABLE SUTURE: Brand: COATED VICRYL

## (undated) DEVICE — CATH 2L URETRL HC 6F 50CM

## (undated) DEVICE — TOTAL TRAY, 16FR 10ML SIL FOLEY, URN: Brand: MEDLINE

## (undated) DEVICE — SNAR E/S POLYP SNAREMASTER OVL/10MM 2.8X2300MM YEL

## (undated) DEVICE — TRY PREP SCRB VAG PVP

## (undated) DEVICE — ENDOSCOPIC VALVE WITH ADAPTER.: Brand: SURSEAL® II

## (undated) DEVICE — INTENDED FOR TISSUE SEPARATION, AND OTHER PROCEDURES THAT REQUIRE A SHARP SURGICAL BLADE TO PUNCTURE OR CUT.: Brand: BARD-PARKER ® CARBON RIB-BACK BLADES

## (undated) DEVICE — Device: Brand: DEFENDO AIR/WATER/SUCTION AND BIOPSY VALVE

## (undated) DEVICE — SUT GUT CHRM 3/0 SH 27IN G122H

## (undated) DEVICE — SUT MERSILENE POLYSTR CT1 BR 0 75CM GRN

## (undated) DEVICE — GLV SURG BIOGEL LTX PF 7

## (undated) DEVICE — EGD OR ERCP KIT: Brand: MEDLINE INDUSTRIES, INC.

## (undated) DEVICE — TOWEL,OR,DSP,ST,BLUE,STD,4/PK,20PK/CS: Brand: MEDLINE

## (undated) DEVICE — TUBING, SUCTION, 1/4" X 10', STRAIGHT: Brand: MEDLINE

## (undated) DEVICE — SHEATH URETRL ACC UROPASS 12/14F 38CM

## (undated) DEVICE — SOLIDIFIER LIQLOC PLS 1500CC BT

## (undated) DEVICE — NITINOL STONE RETRIEVAL BASKET: Brand: ESCAPE

## (undated) DEVICE — MAJOR-LF: Brand: MEDLINE INDUSTRIES, INC.

## (undated) DEVICE — THE STERILE LIGHT HANDLE COVER IS USED WITH STERIS SURGICAL LIGHTING AND VISUALIZATION SYSTEMS.

## (undated) DEVICE — Device

## (undated) DEVICE — DECANT BG O JET

## (undated) DEVICE — TLC MALE UROLOGY RETRACTOR SYSTEM WITH FRAME (BOXED): Brand: TLC SELF-RETAINING RETRACTOR SYSTEM

## (undated) DEVICE — URETERAL ACCESS SHEATH SET: Brand: NAVIGATOR HD

## (undated) DEVICE — 3M™ IOBAN™ 2 ANTIMICROBIAL INCISE DRAPE 6651EZ: Brand: IOBAN™ 2

## (undated) DEVICE — 3M™ STERI-DRAPE™ U-DRAPE 1015: Brand: STERI-DRAPE™

## (undated) DEVICE — SOL IRR NACL 0.9PCT BT 1000ML

## (undated) DEVICE — BASIC SINGLE BASIN-LF: Brand: MEDLINE INDUSTRIES, INC.

## (undated) DEVICE — GW ZIPWIRE STD/SHFT STR TPR/3CM .038IN 150CM

## (undated) DEVICE — DRAPE,U/ SHT,SPLIT,PLAS,STERIL: Brand: MEDLINE

## (undated) DEVICE — PENCL E/S SMOKEEVAC W/TELESCP CANN

## (undated) DEVICE — THE SINGLE USE ETRAP – POLYP TRAP IS USED FOR SUCTION RETRIEVAL OF ENDOSCOPICALLY REMOVED POLYPS.: Brand: ETRAP

## (undated) DEVICE — SUT PDS 3/0 SH 27IN DYED Z316H

## (undated) DEVICE — SUT GUT CHRM 2/0 SH 27IN G123H

## (undated) DEVICE — SYR LUERLOK 50ML

## (undated) DEVICE — SINGLE-USE BIOPSY FORCEPS: Brand: RADIAL JAW 4

## (undated) DEVICE — SKIN PREP TRAY W/CHG: Brand: MEDLINE INDUSTRIES, INC.

## (undated) DEVICE — SET, IRRIGATION CYSTO, Y-TYPE, 81": Brand: MEDLINE

## (undated) DEVICE — SOL IRRG H2O PL/BG 1000ML STRL

## (undated) DEVICE — CYSTO PACK: Brand: MEDLINE INDUSTRIES, INC.

## (undated) DEVICE — APPL DURAPREP IODOPHOR APL 26ML

## (undated) DEVICE — SLV SCD LEG COMFORT KENDALLSCD MD REPROC

## (undated) DEVICE — FIBR LASR MOSES 200 DFL 2J 80HZ

## (undated) DEVICE — SUT VIC 2/0 SH 27IN

## (undated) DEVICE — GW PTFE FIX/CORE STFF STR .038 3X150CM

## (undated) DEVICE — GLOVE,SURG,SENSICARE SLT,LF,PF,8: Brand: MEDLINE

## (undated) DEVICE — SUT VIC 2/0 UR6 27IN J602H

## (undated) DEVICE — SYS IRR PUMP SGL ACTN VAC SYR 10CC